# Patient Record
Sex: MALE | Race: WHITE | NOT HISPANIC OR LATINO | Employment: OTHER | URBAN - METROPOLITAN AREA
[De-identification: names, ages, dates, MRNs, and addresses within clinical notes are randomized per-mention and may not be internally consistent; named-entity substitution may affect disease eponyms.]

---

## 2017-11-28 ENCOUNTER — PATIENT MESSAGE (OUTPATIENT)
Dept: SURGERY | Facility: CLINIC | Age: 77
End: 2017-11-28

## 2017-12-12 ENCOUNTER — TELEPHONE (OUTPATIENT)
Dept: SURGERY | Facility: CLINIC | Age: 77
End: 2017-12-12

## 2017-12-12 NOTE — TELEPHONE ENCOUNTER
----- Message from Adria Woodruff MA sent at 12/12/2017 11:11 AM CST -----  Contact: Kim [phone]  Shannon called in regarding Mr Sanchez who needs a f/u appt with Dr Chavez.    Kim can be reached at [phone].    Thanks

## 2017-12-21 ENCOUNTER — OFFICE VISIT (OUTPATIENT)
Dept: SURGERY | Facility: CLINIC | Age: 77
End: 2017-12-21
Payer: MEDICARE

## 2017-12-21 VITALS
TEMPERATURE: 98 F | HEIGHT: 64 IN | DIASTOLIC BLOOD PRESSURE: 86 MMHG | BODY MASS INDEX: 43.1 KG/M2 | HEART RATE: 71 BPM | WEIGHT: 252.44 LBS | SYSTOLIC BLOOD PRESSURE: 177 MMHG

## 2017-12-21 DIAGNOSIS — C22.9 MALIGNANT NEOPLASM OF LIVER, UNSPECIFIED LIVER MALIGNANCY TYPE: Primary | ICD-10-CM

## 2017-12-21 PROCEDURE — 99999 PR PBB SHADOW E&M-EST. PATIENT-LVL III: CPT | Mod: PBBFAC,,, | Performed by: SURGERY

## 2017-12-21 PROCEDURE — 99214 OFFICE O/P EST MOD 30 MIN: CPT | Mod: S$PBB,,, | Performed by: SURGERY

## 2017-12-21 PROCEDURE — 99213 OFFICE O/P EST LOW 20 MIN: CPT | Mod: PBBFAC | Performed by: SURGERY

## 2017-12-21 RX ORDER — MUPIROCIN 20 MG/G
OINTMENT TOPICAL
COMMUNITY
Start: 2017-09-20 | End: 2018-02-09

## 2017-12-21 RX ORDER — CEPHALEXIN 500 MG/1
CAPSULE ORAL
COMMUNITY
Start: 2017-09-18 | End: 2018-02-09

## 2017-12-21 NOTE — PROGRESS NOTES
"History & Physical    SUBJECTIVE:     History of Present Illness:  Patient is a 77 y.o. male known to Dr. Chavez's service s/p a laparoscopic liver biopsy in 05/2016 (results benign) who presents for evaluation of enlargement of the liver mass on CT done at an outside hospital and biopsy which showed "clear cell neoplasm". He also had a right upper lobe pulmonary nodule biopsied at the same time which showed adenocarcinoma of the lung. Patient states that he sees an oncologist for his anemia who ordered the CT and referred him here. He is otherwise in his typical state of health and reports no changes.     Chief Complaint   Patient presents with    Follow-up       Review of patient's allergies indicates:   Allergen Reactions    Altace [ramipril] Anaphylaxis       Current Outpatient Prescriptions   Medication Sig Dispense Refill    apixaban (ELIQUIS) 5 mg Tab Take 5 mg by mouth 2 (two) times daily.      aspirin 81 MG Chew Take 81 mg by mouth once daily.      calcium carbonate (OS-CAMILA) 600 mg (1,500 mg) Tab Take 600 mg by mouth 2 (two) times daily with meals.      cephALEXin (KEFLEX) 500 MG capsule       finasteride (PROSCAR) 5 mg tablet       furosemide (LASIX) 40 MG tablet Take 40 mg by mouth once daily.      iron polysaccharides (NIFEREX) 150 mg iron Cap Take 150 mg by mouth 2 (two) times daily.      metformin (GLUCOPHAGE) 1000 MG tablet Take 1,000 mg by mouth 2 (two) times daily with meals.      MULTIVIT-IRON-MIN-FOLIC ACID 3,500-18-0.4 UNIT-MG-MG ORAL CHEW Take by mouth.      mupirocin (BACTROBAN) 2 % ointment       pantoprazole (PROTONIX) 40 MG tablet Take 40 mg by mouth once daily.      pravastatin (PRAVACHOL) 10 MG tablet Take 10 mg by mouth once daily.      TOPROL XL 25 mg 24 hr tablet       verapamil (VERELAN PM) 300 mg 24 hr capsule       vitamin D 1000 units Tab Take 2,000 Units by mouth once daily.      metformin (GLUCOPHAGE) 500 MG tablet       senna-docusate 8.6-50 mg (PERICOLACE) " "8.6-50 mg per tablet Take 1 tablet by mouth 2 (two) times daily. 30 tablet 0    tamsulosin (FLOMAX) 0.4 mg Cp24        No current facility-administered medications for this visit.        Past Medical History:   Diagnosis Date    Cancer     Diabetes mellitus     Encounter for blood transfusion     GI bleed 8/6/2003    History of knee replacement procedure of right knee 2007    Hypertension     Sleep apnea 12/18/15    Stroke 2014    Stroke 7/16/14    TIA (transient ischemic attack) 2/11/15     Past Surgical History:   Procedure Laterality Date    APPENDECTOMY  teen    BONE MARROW BIOPSY      CATARACT EXTRACTION  12/10/11    COLONOSCOPY  12/21/15    KNEE SURGERY Right replacement     No family history on file.  Social History   Substance Use Topics    Smoking status: Never Smoker    Smokeless tobacco: Not on file    Alcohol use No        Review of Systems:  Review of Systems   Constitutional: Negative for activity change, appetite change, fatigue and fever.   Respiratory: Negative for cough and shortness of breath.    Cardiovascular: Negative for chest pain and palpitations.   Gastrointestinal: Negative for abdominal distention, abdominal pain, constipation, diarrhea, nausea and vomiting.   Endocrine: Negative for cold intolerance and heat intolerance.   Musculoskeletal: Negative for arthralgias and myalgias.   Neurological: Negative for dizziness and light-headedness.       OBJECTIVE:     Vital Signs (Most Recent)  Temp: 98.1 °F (36.7 °C) (12/21/17 1042)  Pulse: 71 (12/21/17 1042)  BP: (!) 177/86 (12/21/17 1042)  5' 4" (1.626 m)  114.5 kg (252 lb 6.8 oz)     Physical Exam:  Physical Exam   Constitutional: He is oriented to person, place, and time. He appears well-developed and well-nourished. No distress.   HENT:   Head: Normocephalic and atraumatic.   Cardiovascular: Normal rate and regular rhythm.    Pulmonary/Chest: Effort normal. No respiratory distress.   Abdominal: Soft. He exhibits no " distension. There is no tenderness. There is no rebound and no guarding.   Neurological: He is alert and oriented to person, place, and time.   Skin: Skin is warm and dry.       Diagnostic Results:  Outside CT results:  There is peripheral hypervascular lesion in the dome of the segment four of the liver measuring approximately 5.7x7.2cm has significant decrease in size comparison with the prior 11/21/2012 without significant changes in hypervascularity. In the segment three of the liver there is an 9mm hypovascular lesion that is unchanged in comparison with the prior CT of the chest. There are numberous hypovascular lesions involving most of the right lobe of the liver measuring approximately 63o79m6.1cm probably consistent with the necrotic mass. 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 My findings are summarized in the resident's note included in the record.  Needs a complete review of path  No surgical intervention would be appropriate given the two liver lesions and lung lesion  Will communicate with med onc in [facility]  Patient would prefer to be seen and followed close to home

## 2017-12-28 ENCOUNTER — PATIENT MESSAGE (OUTPATIENT)
Dept: SURGERY | Facility: CLINIC | Age: 77
End: 2017-12-28

## 2018-01-01 ENCOUNTER — TELEPHONE (OUTPATIENT)
Dept: INTERVENTIONAL RADIOLOGY/VASCULAR | Facility: CLINIC | Age: 78
End: 2018-01-01

## 2018-01-01 ENCOUNTER — OFFICE VISIT (OUTPATIENT)
Dept: INTERVENTIONAL RADIOLOGY/VASCULAR | Facility: CLINIC | Age: 78
End: 2018-01-01
Payer: MEDICARE

## 2018-01-01 ENCOUNTER — TELEPHONE (OUTPATIENT)
Dept: RADIATION ONCOLOGY | Facility: CLINIC | Age: 78
End: 2018-01-01

## 2018-01-01 ENCOUNTER — DOCUMENTATION ONLY (OUTPATIENT)
Dept: RADIATION ONCOLOGY | Facility: CLINIC | Age: 78
End: 2018-01-01

## 2018-01-01 ENCOUNTER — INITIAL CONSULT (OUTPATIENT)
Dept: RADIATION ONCOLOGY | Facility: CLINIC | Age: 78
End: 2018-01-01
Payer: MEDICARE

## 2018-01-01 ENCOUNTER — HOSPITAL ENCOUNTER (OUTPATIENT)
Dept: RADIATION THERAPY | Facility: HOSPITAL | Age: 78
Discharge: HOME | End: 2018-12-03
Attending: RADIOLOGY
Payer: MEDICARE

## 2018-01-01 ENCOUNTER — SURGERY (OUTPATIENT)
Age: 78
End: 2018-01-01

## 2018-01-01 ENCOUNTER — HOSPITAL ENCOUNTER (OUTPATIENT)
Dept: RADIOLOGY | Facility: HOSPITAL | Age: 78
Discharge: HOME | End: 2018-11-06
Attending: INTERNAL MEDICINE
Payer: MEDICARE

## 2018-01-01 ENCOUNTER — PATIENT MESSAGE (OUTPATIENT)
Dept: HEPATOLOGY | Facility: CLINIC | Age: 78
End: 2018-01-01

## 2018-01-01 ENCOUNTER — HOSPITAL ENCOUNTER (OUTPATIENT)
Dept: RADIOLOGY | Facility: HOSPITAL | Age: 78
Discharge: HOME | End: 2018-06-29
Attending: FAMILY MEDICINE
Payer: MEDICARE

## 2018-01-01 ENCOUNTER — LAB VISIT (OUTPATIENT)
Dept: LAB | Facility: HOSPITAL | Age: 78
End: 2018-01-01
Payer: MEDICARE

## 2018-01-01 ENCOUNTER — HOSPITAL ENCOUNTER (OUTPATIENT)
Dept: RADIOLOGY | Facility: HOSPITAL | Age: 78
Discharge: HOME | End: 2018-08-08
Attending: FAMILY MEDICINE | Admitting: RADIOLOGY
Payer: MEDICARE

## 2018-01-01 ENCOUNTER — TELEPHONE (OUTPATIENT)
Dept: HEPATOLOGY | Facility: CLINIC | Age: 78
End: 2018-01-01

## 2018-01-01 ENCOUNTER — TELEPHONE (OUTPATIENT)
Dept: INTERVENTIONAL RADIOLOGY/VASCULAR | Facility: HOSPITAL | Age: 78
End: 2018-01-01

## 2018-01-01 ENCOUNTER — OFFICE VISIT (OUTPATIENT)
Dept: HEMATOLOGY/ONCOLOGY | Facility: CLINIC | Age: 78
End: 2018-01-01
Payer: MEDICARE

## 2018-01-01 ENCOUNTER — PATIENT MESSAGE (OUTPATIENT)
Dept: HEMATOLOGY/ONCOLOGY | Facility: CLINIC | Age: 78
End: 2018-01-01

## 2018-01-01 ENCOUNTER — LAB VISIT (OUTPATIENT)
Dept: LAB | Facility: HOSPITAL | Age: 78
End: 2018-01-01
Attending: INTERNAL MEDICINE
Payer: MEDICARE

## 2018-01-01 ENCOUNTER — DOCUMENTATION ONLY (OUTPATIENT)
Dept: TRANSPLANT | Facility: CLINIC | Age: 78
End: 2018-01-01

## 2018-01-01 ENCOUNTER — HOSPITAL ENCOUNTER (OUTPATIENT)
Dept: RADIATION THERAPY | Facility: HOSPITAL | Age: 78
Discharge: HOME | End: 2018-11-08
Attending: RADIOLOGY
Payer: MEDICARE

## 2018-01-01 ENCOUNTER — HOSPITAL ENCOUNTER (OUTPATIENT)
Facility: HOSPITAL | Age: 78
Discharge: HOME | End: 2018-07-18
Attending: RADIOLOGY | Admitting: RADIOLOGY
Payer: MEDICARE

## 2018-01-01 ENCOUNTER — HOSPITAL ENCOUNTER (OUTPATIENT)
Dept: RADIOLOGY | Facility: HOSPITAL | Age: 78
Discharge: HOME | End: 2018-07-18
Attending: FAMILY MEDICINE | Admitting: RADIOLOGY
Payer: MEDICARE

## 2018-01-01 ENCOUNTER — HOSPITAL ENCOUNTER (OUTPATIENT)
Dept: RADIOLOGY | Facility: HOSPITAL | Age: 78
Discharge: HOME | End: 2018-12-21
Attending: FAMILY MEDICINE
Payer: MEDICARE

## 2018-01-01 ENCOUNTER — HOSPITAL ENCOUNTER (OUTPATIENT)
Facility: HOSPITAL | Age: 78
Discharge: HOME | End: 2018-09-25
Attending: RADIOLOGY | Admitting: RADIOLOGY
Payer: MEDICARE

## 2018-01-01 ENCOUNTER — HOSPITAL ENCOUNTER (OUTPATIENT)
Dept: RADIOLOGY | Facility: HOSPITAL | Age: 78
Discharge: HOME | End: 2018-09-25
Attending: FAMILY MEDICINE | Admitting: RADIOLOGY
Payer: MEDICARE

## 2018-01-01 ENCOUNTER — HOSPITAL ENCOUNTER (OUTPATIENT)
Facility: HOSPITAL | Age: 78
Discharge: HOME | End: 2018-08-08
Attending: RADIOLOGY | Admitting: RADIOLOGY
Payer: MEDICARE

## 2018-01-01 ENCOUNTER — OFFICE VISIT (OUTPATIENT)
Dept: HEPATOLOGY | Facility: CLINIC | Age: 78
End: 2018-01-01
Payer: MEDICARE

## 2018-01-01 VITALS
WEIGHT: 255.19 LBS | HEART RATE: 81 BPM | DIASTOLIC BLOOD PRESSURE: 83 MMHG | HEIGHT: 64 IN | BODY MASS INDEX: 43.57 KG/M2 | SYSTOLIC BLOOD PRESSURE: 155 MMHG

## 2018-01-01 VITALS
HEIGHT: 64 IN | DIASTOLIC BLOOD PRESSURE: 88 MMHG | WEIGHT: 246.88 LBS | BODY MASS INDEX: 42.15 KG/M2 | SYSTOLIC BLOOD PRESSURE: 149 MMHG | HEART RATE: 92 BPM

## 2018-01-01 VITALS
WEIGHT: 249.31 LBS | BODY MASS INDEX: 42.56 KG/M2 | SYSTOLIC BLOOD PRESSURE: 156 MMHG | HEART RATE: 80 BPM | HEIGHT: 64 IN | DIASTOLIC BLOOD PRESSURE: 87 MMHG

## 2018-01-01 VITALS
OXYGEN SATURATION: 100 % | TEMPERATURE: 98 F | SYSTOLIC BLOOD PRESSURE: 161 MMHG | DIASTOLIC BLOOD PRESSURE: 88 MMHG | HEART RATE: 80 BPM | WEIGHT: 247 LBS | RESPIRATION RATE: 16 BRPM | BODY MASS INDEX: 42.17 KG/M2 | HEIGHT: 64 IN

## 2018-01-01 VITALS
HEIGHT: 64 IN | RESPIRATION RATE: 18 BRPM | WEIGHT: 255 LBS | OXYGEN SATURATION: 98 % | BODY MASS INDEX: 43.54 KG/M2 | DIASTOLIC BLOOD PRESSURE: 71 MMHG | HEART RATE: 87 BPM | TEMPERATURE: 97 F | SYSTOLIC BLOOD PRESSURE: 161 MMHG

## 2018-01-01 VITALS
BODY MASS INDEX: 42.83 KG/M2 | WEIGHT: 250.88 LBS | SYSTOLIC BLOOD PRESSURE: 150 MMHG | HEIGHT: 64 IN | HEART RATE: 88 BPM | DIASTOLIC BLOOD PRESSURE: 87 MMHG

## 2018-01-01 VITALS
DIASTOLIC BLOOD PRESSURE: 68 MMHG | RESPIRATION RATE: 18 BRPM | TEMPERATURE: 98 F | WEIGHT: 261.69 LBS | SYSTOLIC BLOOD PRESSURE: 142 MMHG | BODY MASS INDEX: 44.68 KG/M2 | HEART RATE: 85 BPM | OXYGEN SATURATION: 97 % | HEIGHT: 64 IN

## 2018-01-01 VITALS
BODY MASS INDEX: 42.85 KG/M2 | TEMPERATURE: 98 F | HEART RATE: 70 BPM | SYSTOLIC BLOOD PRESSURE: 128 MMHG | WEIGHT: 251 LBS | OXYGEN SATURATION: 99 % | DIASTOLIC BLOOD PRESSURE: 90 MMHG | RESPIRATION RATE: 16 BRPM | HEIGHT: 64 IN

## 2018-01-01 VITALS
SYSTOLIC BLOOD PRESSURE: 149 MMHG | OXYGEN SATURATION: 98 % | RESPIRATION RATE: 18 BRPM | BODY MASS INDEX: 42.16 KG/M2 | WEIGHT: 246.94 LBS | TEMPERATURE: 97 F | HEIGHT: 64 IN | HEART RATE: 92 BPM | DIASTOLIC BLOOD PRESSURE: 88 MMHG

## 2018-01-01 VITALS
HEIGHT: 64 IN | WEIGHT: 248.25 LBS | OXYGEN SATURATION: 97 % | TEMPERATURE: 96 F | RESPIRATION RATE: 18 BRPM | HEART RATE: 67 BPM | SYSTOLIC BLOOD PRESSURE: 130 MMHG | BODY MASS INDEX: 42.38 KG/M2 | DIASTOLIC BLOOD PRESSURE: 63 MMHG

## 2018-01-01 VITALS
OXYGEN SATURATION: 99 % | SYSTOLIC BLOOD PRESSURE: 134 MMHG | HEART RATE: 110 BPM | HEIGHT: 64 IN | TEMPERATURE: 98 F | BODY MASS INDEX: 42.66 KG/M2 | WEIGHT: 249.88 LBS | RESPIRATION RATE: 20 BRPM | DIASTOLIC BLOOD PRESSURE: 86 MMHG

## 2018-01-01 VITALS
SYSTOLIC BLOOD PRESSURE: 189 MMHG | BODY MASS INDEX: 43.25 KG/M2 | RESPIRATION RATE: 18 BRPM | HEART RATE: 99 BPM | WEIGHT: 253.31 LBS | DIASTOLIC BLOOD PRESSURE: 85 MMHG | HEIGHT: 64 IN | TEMPERATURE: 97 F | OXYGEN SATURATION: 98 %

## 2018-01-01 DIAGNOSIS — C22.0 HEPATOCELLULAR CARCINOMA: Primary | ICD-10-CM

## 2018-01-01 DIAGNOSIS — R91.8 MASS OF UPPER LOBE OF RIGHT LUNG: ICD-10-CM

## 2018-01-01 DIAGNOSIS — E11.9 TYPE 2 DIABETES MELLITUS WITHOUT COMPLICATION, WITHOUT LONG-TERM CURRENT USE OF INSULIN: ICD-10-CM

## 2018-01-01 DIAGNOSIS — C22.0 HEPATOCELLULAR CARCINOMA: ICD-10-CM

## 2018-01-01 DIAGNOSIS — C22.0 HCC (HEPATOCELLULAR CARCINOMA): ICD-10-CM

## 2018-01-01 DIAGNOSIS — C34.11 PRIMARY ADENOCARCINOMA OF UPPER LOBE OF RIGHT LUNG: ICD-10-CM

## 2018-01-01 DIAGNOSIS — K74.60 HEPATIC CIRRHOSIS, UNSPECIFIED HEPATIC CIRRHOSIS TYPE, UNSPECIFIED WHETHER ASCITES PRESENT: ICD-10-CM

## 2018-01-01 DIAGNOSIS — K76.9 LIVER LESION: ICD-10-CM

## 2018-01-01 DIAGNOSIS — I48.20 CHRONIC A-FIB: ICD-10-CM

## 2018-01-01 DIAGNOSIS — C22.0 HCC (HEPATOCELLULAR CARCINOMA): Primary | ICD-10-CM

## 2018-01-01 DIAGNOSIS — K11.9 LESION OF PAROTID GLAND: ICD-10-CM

## 2018-01-01 DIAGNOSIS — K74.60 HEPATIC CIRRHOSIS, UNSPECIFIED HEPATIC CIRRHOSIS TYPE, UNSPECIFIED WHETHER ASCITES PRESENT: Primary | ICD-10-CM

## 2018-01-01 DIAGNOSIS — C34.11 PRIMARY ADENOCARCINOMA OF UPPER LOBE OF RIGHT LUNG: Primary | ICD-10-CM

## 2018-01-01 LAB
AFP SERPL-MCNC: 11 NG/ML
AFP SERPL-MCNC: 16 NG/ML
AFP SERPL-MCNC: 7.3 NG/ML
AFP SERPL-MCNC: 8.5 NG/ML
ALBUMIN SERPL BCP-MCNC: 3 G/DL
ALBUMIN SERPL BCP-MCNC: 3.2 G/DL
ALBUMIN SERPL BCP-MCNC: 3.3 G/DL
ALBUMIN SERPL BCP-MCNC: 3.4 G/DL
ALP SERPL-CCNC: 112 U/L
ALP SERPL-CCNC: 112 U/L
ALP SERPL-CCNC: 139 U/L
ALP SERPL-CCNC: 172 U/L
ALT SERPL W/O P-5'-P-CCNC: 10 U/L
ALT SERPL W/O P-5'-P-CCNC: 12 U/L
ALT SERPL W/O P-5'-P-CCNC: 16 U/L
ALT SERPL W/O P-5'-P-CCNC: 17 U/L
ANION GAP SERPL CALC-SCNC: 10 MMOL/L
ANION GAP SERPL CALC-SCNC: 11 MMOL/L
ANION GAP SERPL CALC-SCNC: 13 MMOL/L
ANION GAP SERPL CALC-SCNC: 14 MMOL/L
AST SERPL-CCNC: 17 U/L
AST SERPL-CCNC: 20 U/L
AST SERPL-CCNC: 22 U/L
AST SERPL-CCNC: 29 U/L
BILIRUB SERPL-MCNC: 0.9 MG/DL
BILIRUB SERPL-MCNC: 0.9 MG/DL
BILIRUB SERPL-MCNC: 1 MG/DL
BILIRUB SERPL-MCNC: 1.2 MG/DL
BUN SERPL-MCNC: 12 MG/DL
BUN SERPL-MCNC: 13 MG/DL
BUN SERPL-MCNC: 15 MG/DL
BUN SERPL-MCNC: 21 MG/DL
CALCIUM SERPL-MCNC: 9.2 MG/DL
CALCIUM SERPL-MCNC: 9.3 MG/DL
CALCIUM SERPL-MCNC: 9.6 MG/DL
CALCIUM SERPL-MCNC: 9.9 MG/DL
CHLORIDE SERPL-SCNC: 104 MMOL/L
CHLORIDE SERPL-SCNC: 105 MMOL/L
CO2 SERPL-SCNC: 21 MMOL/L
CO2 SERPL-SCNC: 24 MMOL/L
CO2 SERPL-SCNC: 24 MMOL/L
CO2 SERPL-SCNC: 26 MMOL/L
CREAT SERPL-MCNC: 0.9 MG/DL
CREAT SERPL-MCNC: 0.9 MG/DL
CREAT SERPL-MCNC: 0.9 MG/DL (ref 0.5–1.4)
CREAT SERPL-MCNC: 1 MG/DL
CREAT SERPL-MCNC: 1.3 MG/DL
CREAT SERPL-MCNC: 1.5 MG/DL (ref 0.5–1.4)
EST. GFR  (AFRICAN AMERICAN): >60 ML/MIN/1.73 M^2
EST. GFR  (NON AFRICAN AMERICAN): 53 ML/MIN/1.73 M^2
EST. GFR  (NON AFRICAN AMERICAN): >60 ML/MIN/1.73 M^2
GLUCOSE SERPL-MCNC: 105 MG/DL
GLUCOSE SERPL-MCNC: 115 MG/DL
GLUCOSE SERPL-MCNC: 116 MG/DL
GLUCOSE SERPL-MCNC: 121 MG/DL
HBV SURFACE AB SER-ACNC: NEGATIVE M[IU]/ML
HBV SURFACE AG SERPL QL IA: NEGATIVE
HCV AB SERPL QL IA: NEGATIVE
POCT GLUCOSE: 119 MG/DL (ref 70–110)
POCT GLUCOSE: 137 MG/DL (ref 70–110)
POCT GLUCOSE: 150 MG/DL (ref 70–110)
POCT GLUCOSE: 94 MG/DL (ref 70–110)
POTASSIUM SERPL-SCNC: 4.2 MMOL/L
POTASSIUM SERPL-SCNC: 4.4 MMOL/L
POTASSIUM SERPL-SCNC: 4.9 MMOL/L
POTASSIUM SERPL-SCNC: 5.1 MMOL/L
PROT SERPL-MCNC: 6.3 G/DL
PROT SERPL-MCNC: 6.4 G/DL
PROT SERPL-MCNC: 6.7 G/DL
PROT SERPL-MCNC: 6.8 G/DL
SAMPLE: ABNORMAL
SAMPLE: NORMAL
SODIUM SERPL-SCNC: 138 MMOL/L
SODIUM SERPL-SCNC: 139 MMOL/L
SODIUM SERPL-SCNC: 141 MMOL/L
SODIUM SERPL-SCNC: 142 MMOL/L

## 2018-01-01 PROCEDURE — 63600175 PHARM REV CODE 636 W HCPCS: Performed by: RADIOLOGY

## 2018-01-01 PROCEDURE — 77290 THER RAD SIMULAJ FIELD CPLX: CPT | Mod: 26,,, | Performed by: RADIOLOGY

## 2018-01-01 PROCEDURE — 25000003 PHARM REV CODE 250: Performed by: RADIOLOGY

## 2018-01-01 PROCEDURE — 77338 DESIGN MLC DEVICE FOR IMRT: CPT | Mod: 26,,, | Performed by: RADIOLOGY

## 2018-01-01 PROCEDURE — 80053 COMPREHEN METABOLIC PANEL: CPT

## 2018-01-01 PROCEDURE — 99213 OFFICE O/P EST LOW 20 MIN: CPT | Mod: PBBFAC,25

## 2018-01-01 PROCEDURE — 36415 COLL VENOUS BLD VENIPUNCTURE: CPT

## 2018-01-01 PROCEDURE — 77014 HC CT GUIDANCE RADIATION THERAPY FLDS PLACEMENT: CPT | Mod: TC | Performed by: RADIOLOGY

## 2018-01-01 PROCEDURE — 99213 OFFICE O/P EST LOW 20 MIN: CPT | Mod: S$PBB,,, | Performed by: FAMILY MEDICINE

## 2018-01-01 PROCEDURE — 99212 OFFICE O/P EST SF 10 MIN: CPT | Mod: S$PBB,,, | Performed by: FAMILY MEDICINE

## 2018-01-01 PROCEDURE — 99999 PR PBB SHADOW E&M-EST. PATIENT-LVL III: CPT | Mod: PBBFAC,,,

## 2018-01-01 PROCEDURE — 77373 STRTCTC BDY RAD THER TX DLVR: CPT | Performed by: RADIOLOGY

## 2018-01-01 PROCEDURE — 25000003 PHARM REV CODE 250: Performed by: FAMILY MEDICINE

## 2018-01-01 PROCEDURE — 77301 RADIOTHERAPY DOSE PLAN IMRT: CPT | Mod: TC | Performed by: RADIOLOGY

## 2018-01-01 PROCEDURE — 74183 MRI ABD W/O CNTR FLWD CNTR: CPT | Mod: 26,,, | Performed by: RADIOLOGY

## 2018-01-01 PROCEDURE — 82962 GLUCOSE BLOOD TEST: CPT

## 2018-01-01 PROCEDURE — 78815 PET IMAGE W/CT SKULL-THIGH: CPT | Mod: 26,PI,, | Performed by: RADIOLOGY

## 2018-01-01 PROCEDURE — 25500020 PHARM REV CODE 255: Performed by: RADIOLOGY

## 2018-01-01 PROCEDURE — 99213 OFFICE O/P EST LOW 20 MIN: CPT | Mod: PBBFAC

## 2018-01-01 PROCEDURE — 99215 OFFICE O/P EST HI 40 MIN: CPT | Mod: S$PBB,,, | Performed by: INTERNAL MEDICINE

## 2018-01-01 PROCEDURE — 99212 OFFICE O/P EST SF 10 MIN: CPT | Mod: S$PBB,,, | Performed by: RADIOLOGY

## 2018-01-01 PROCEDURE — 77470 SPECIAL RADIATION TREATMENT: CPT | Mod: 26,59,, | Performed by: RADIOLOGY

## 2018-01-01 PROCEDURE — 99215 OFFICE O/P EST HI 40 MIN: CPT | Mod: PBBFAC | Performed by: INTERNAL MEDICINE

## 2018-01-01 PROCEDURE — 70553 MRI BRAIN STEM W/O & W/DYE: CPT | Mod: TC

## 2018-01-01 PROCEDURE — A9540 TC99M MAA: HCPCS

## 2018-01-01 PROCEDURE — 82105 ALPHA-FETOPROTEIN SERUM: CPT

## 2018-01-01 PROCEDURE — 78201 LIVER IMAGING STATIC ONLY: CPT | Mod: TC

## 2018-01-01 PROCEDURE — A9585 GADOBUTROL INJECTION: HCPCS | Performed by: FAMILY MEDICINE

## 2018-01-01 PROCEDURE — 25500020 PHARM REV CODE 255: Performed by: FAMILY MEDICINE

## 2018-01-01 PROCEDURE — 86803 HEPATITIS C AB TEST: CPT

## 2018-01-01 PROCEDURE — 99999 PR PBB SHADOW E&M-EST. PATIENT-LVL II: CPT | Mod: PBBFAC,,,

## 2018-01-01 PROCEDURE — 77293 RESPIRATOR MOTION MGMT SIMUL: CPT | Mod: TC | Performed by: RADIOLOGY

## 2018-01-01 PROCEDURE — 86706 HEP B SURFACE ANTIBODY: CPT

## 2018-01-01 PROCEDURE — 77338 DESIGN MLC DEVICE FOR IMRT: CPT | Mod: TC | Performed by: RADIOLOGY

## 2018-01-01 PROCEDURE — 78201 LIVER IMAGING STATIC ONLY: CPT | Mod: 26,,, | Performed by: RADIOLOGY

## 2018-01-01 PROCEDURE — 70553 MRI BRAIN STEM W/O & W/DYE: CPT | Mod: 26,,, | Performed by: RADIOLOGY

## 2018-01-01 PROCEDURE — 99204 OFFICE O/P NEW MOD 45 MIN: CPT | Mod: S$PBB,,, | Performed by: INTERNAL MEDICINE

## 2018-01-01 PROCEDURE — 63600175 PHARM REV CODE 636 W HCPCS: Performed by: FAMILY MEDICINE

## 2018-01-01 PROCEDURE — 77334 RADIATION TREATMENT AID(S): CPT | Mod: TC | Performed by: RADIOLOGY

## 2018-01-01 PROCEDURE — 77263 THER RADIOLOGY TX PLNG CPLX: CPT | Mod: ,,, | Performed by: RADIOLOGY

## 2018-01-01 PROCEDURE — 77370 RADIATION PHYSICS CONSULT: CPT | Performed by: RADIOLOGY

## 2018-01-01 PROCEDURE — 99204 OFFICE O/P NEW MOD 45 MIN: CPT | Mod: S$PBB,,, | Performed by: RADIOLOGY

## 2018-01-01 PROCEDURE — 99999 PR PBB SHADOW E&M-EST. PATIENT-LVL III: CPT | Mod: PBBFAC,,, | Performed by: INTERNAL MEDICINE

## 2018-01-01 PROCEDURE — 77014 HC CT GUIDANCE RADIATION THERAPY FLDS PLACEMENT: CPT | Mod: TC,59 | Performed by: RADIOLOGY

## 2018-01-01 PROCEDURE — 99213 OFFICE O/P EST LOW 20 MIN: CPT | Mod: PBBFAC | Performed by: INTERNAL MEDICINE

## 2018-01-01 PROCEDURE — 77334 RADIATION TREATMENT AID(S): CPT | Mod: 26,,, | Performed by: RADIOLOGY

## 2018-01-01 PROCEDURE — A9552 F18 FDG: HCPCS

## 2018-01-01 PROCEDURE — 78815 PET IMAGE W/CT SKULL-THIGH: CPT | Mod: TC,PI

## 2018-01-01 PROCEDURE — 99215 OFFICE O/P EST HI 40 MIN: CPT | Mod: PBBFAC,27 | Performed by: INTERNAL MEDICINE

## 2018-01-01 PROCEDURE — 99214 OFFICE O/P EST MOD 30 MIN: CPT | Mod: S$PBB,,, | Performed by: INTERNAL MEDICINE

## 2018-01-01 PROCEDURE — 77386 HC IMRT, COMPLEX: CPT | Performed by: RADIOLOGY

## 2018-01-01 PROCEDURE — 99212 OFFICE O/P EST SF 10 MIN: CPT | Mod: PBBFAC

## 2018-01-01 PROCEDURE — 99999 PR PBB SHADOW E&M-EST. PATIENT-LVL V: CPT | Mod: PBBFAC,,, | Performed by: INTERNAL MEDICINE

## 2018-01-01 PROCEDURE — 25500020 PHARM REV CODE 255: Performed by: INTERNAL MEDICINE

## 2018-01-01 PROCEDURE — 77301 RADIOTHERAPY DOSE PLAN IMRT: CPT | Mod: 26,,, | Performed by: RADIOLOGY

## 2018-01-01 PROCEDURE — 74183 MRI ABD W/O CNTR FLWD CNTR: CPT | Mod: TC

## 2018-01-01 PROCEDURE — A9585 GADOBUTROL INJECTION: HCPCS | Performed by: INTERNAL MEDICINE

## 2018-01-01 PROCEDURE — 77293 RESPIRATOR MOTION MGMT SIMUL: CPT | Mod: 26,,, | Performed by: RADIOLOGY

## 2018-01-01 PROCEDURE — 87340 HEPATITIS B SURFACE AG IA: CPT

## 2018-01-01 PROCEDURE — 77300 RADIATION THERAPY DOSE PLAN: CPT | Mod: TC | Performed by: RADIOLOGY

## 2018-01-01 PROCEDURE — 99999 PR PBB SHADOW E&M-EST. PATIENT-LVL IV: CPT | Mod: PBBFAC,,, | Performed by: RADIOLOGY

## 2018-01-01 PROCEDURE — 77336 RADIATION PHYSICS CONSULT: CPT | Performed by: RADIOLOGY

## 2018-01-01 PROCEDURE — 77470 SPECIAL RADIATION TREATMENT: CPT | Mod: 59,TC | Performed by: RADIOLOGY

## 2018-01-01 PROCEDURE — 99214 OFFICE O/P EST MOD 30 MIN: CPT | Mod: PBBFAC,25 | Performed by: RADIOLOGY

## 2018-01-01 PROCEDURE — 63600175 PHARM REV CODE 636 W HCPCS: Performed by: STUDENT IN AN ORGANIZED HEALTH CARE EDUCATION/TRAINING PROGRAM

## 2018-01-01 PROCEDURE — 77300 RADIATION THERAPY DOSE PLAN: CPT | Mod: 26,,, | Performed by: RADIOLOGY

## 2018-01-01 PROCEDURE — 77290 THER RAD SIMULAJ FIELD CPLX: CPT | Mod: TC | Performed by: RADIOLOGY

## 2018-01-01 RX ORDER — DEXAMETHASONE SODIUM PHOSPHATE 100 MG/10ML
INJECTION INTRAMUSCULAR; INTRAVENOUS CODE/TRAUMA/SEDATION MEDICATION
Status: COMPLETED | OUTPATIENT
Start: 2018-01-01 | End: 2018-01-01

## 2018-01-01 RX ORDER — MIDAZOLAM HYDROCHLORIDE 1 MG/ML
INJECTION INTRAMUSCULAR; INTRAVENOUS CODE/TRAUMA/SEDATION MEDICATION
Status: COMPLETED | OUTPATIENT
Start: 2018-01-01 | End: 2018-01-01

## 2018-01-01 RX ORDER — HEPARIN SODIUM 200 [USP'U]/100ML
500 INJECTION, SOLUTION INTRAVENOUS CONTINUOUS
Status: DISCONTINUED | OUTPATIENT
Start: 2018-01-01 | End: 2018-01-01 | Stop reason: HOSPADM

## 2018-01-01 RX ORDER — GADOBUTROL 604.72 MG/ML
10 INJECTION INTRAVENOUS
Status: COMPLETED | OUTPATIENT
Start: 2018-01-01 | End: 2018-01-01

## 2018-01-01 RX ORDER — FUROSEMIDE 40 MG/1
40 TABLET ORAL DAILY
COMMUNITY
Start: 2018-01-01

## 2018-01-01 RX ORDER — MIDAZOLAM HYDROCHLORIDE 1 MG/ML
1 INJECTION INTRAMUSCULAR; INTRAVENOUS
Status: DISCONTINUED | OUTPATIENT
Start: 2018-01-01 | End: 2018-01-01 | Stop reason: HOSPADM

## 2018-01-01 RX ORDER — FENTANYL CITRATE 50 UG/ML
50 INJECTION, SOLUTION INTRAMUSCULAR; INTRAVENOUS
Status: DISCONTINUED | OUTPATIENT
Start: 2018-01-01 | End: 2018-01-01 | Stop reason: HOSPADM

## 2018-01-01 RX ORDER — LIDOCAINE HYDROCHLORIDE 10 MG/ML
1 INJECTION, SOLUTION EPIDURAL; INFILTRATION; INTRACAUDAL; PERINEURAL ONCE AS NEEDED
Status: COMPLETED | OUTPATIENT
Start: 2018-01-01 | End: 2018-01-01

## 2018-01-01 RX ORDER — METOPROLOL SUCCINATE 25 MG/1
25 TABLET, EXTENDED RELEASE ORAL 2 TIMES DAILY
Status: ON HOLD | COMMUNITY
End: 2019-01-01 | Stop reason: SDUPTHER

## 2018-01-01 RX ORDER — METHYLPREDNISOLONE 4 MG/1
TABLET ORAL
Qty: 21 TABLET | Refills: 0 | Status: SHIPPED | OUTPATIENT
Start: 2018-01-01 | End: 2018-01-01

## 2018-01-01 RX ORDER — SODIUM CHLORIDE 9 MG/ML
INJECTION, SOLUTION INTRAVENOUS CONTINUOUS
Status: DISCONTINUED | OUTPATIENT
Start: 2018-01-01 | End: 2018-01-01 | Stop reason: HOSPADM

## 2018-01-01 RX ORDER — LABETALOL HYDROCHLORIDE 5 MG/ML
INJECTION, SOLUTION INTRAVENOUS CODE/TRAUMA/SEDATION MEDICATION
Status: COMPLETED | OUTPATIENT
Start: 2018-01-01 | End: 2018-01-01

## 2018-01-01 RX ORDER — FENTANYL CITRATE 50 UG/ML
INJECTION, SOLUTION INTRAMUSCULAR; INTRAVENOUS CODE/TRAUMA/SEDATION MEDICATION
Status: COMPLETED | OUTPATIENT
Start: 2018-01-01 | End: 2018-01-01

## 2018-01-01 RX ORDER — AMOXICILLIN AND CLAVULANATE POTASSIUM 500; 125 MG/1; MG/1
1 TABLET, FILM COATED ORAL 2 TIMES DAILY
Qty: 14 TABLET | Refills: 0 | Status: SHIPPED | OUTPATIENT
Start: 2018-01-01 | End: 2018-01-01

## 2018-01-01 RX ORDER — ESOMEPRAZOLE MAGNESIUM 10 MG/1
10 GRANULE, FOR SUSPENSION, EXTENDED RELEASE ORAL
Qty: 300 MG | Refills: 5 | Status: SHIPPED | OUTPATIENT
Start: 2018-01-01 | End: 2018-01-01

## 2018-01-01 RX ORDER — LIDOCAINE HYDROCHLORIDE 10 MG/ML
1 INJECTION, SOLUTION EPIDURAL; INFILTRATION; INTRACAUDAL; PERINEURAL ONCE AS NEEDED
Status: DISCONTINUED | OUTPATIENT
Start: 2018-01-01 | End: 2018-01-01 | Stop reason: HOSPADM

## 2018-01-01 RX ORDER — OXYCODONE HYDROCHLORIDE 5 MG/1
5 TABLET ORAL EVERY 6 HOURS PRN
Qty: 10 TABLET | Refills: 0 | Status: SHIPPED | OUTPATIENT
Start: 2018-01-01 | End: 2018-01-01

## 2018-01-01 RX ADMIN — MIDAZOLAM HYDROCHLORIDE 1 MG: 1 INJECTION, SOLUTION INTRAMUSCULAR; INTRAVENOUS at 11:08

## 2018-01-01 RX ADMIN — FENTANYL CITRATE 50 MCG: 50 INJECTION, SOLUTION INTRAMUSCULAR; INTRAVENOUS at 10:08

## 2018-01-01 RX ADMIN — IOHEXOL 80 ML: 300 INJECTION, SOLUTION INTRAVENOUS at 12:09

## 2018-01-01 RX ADMIN — FENTANYL CITRATE 50 MCG: 50 INJECTION, SOLUTION INTRAMUSCULAR; INTRAVENOUS at 11:07

## 2018-01-01 RX ADMIN — MIDAZOLAM HYDROCHLORIDE 0.5 MG: 1 INJECTION, SOLUTION INTRAMUSCULAR; INTRAVENOUS at 11:09

## 2018-01-01 RX ADMIN — FENTANYL CITRATE 50 MCG: 50 INJECTION, SOLUTION INTRAMUSCULAR; INTRAVENOUS at 09:08

## 2018-01-01 RX ADMIN — GADOBUTROL 10 ML: 604.72 INJECTION INTRAVENOUS at 08:11

## 2018-01-01 RX ADMIN — SODIUM CHLORIDE 3 G: 9 INJECTION, SOLUTION INTRAVENOUS at 10:07

## 2018-01-01 RX ADMIN — DEXAMETHASONE SODIUM PHOSPHATE 20 MG: 10 INJECTION INTRAMUSCULAR; INTRAVENOUS at 11:09

## 2018-01-01 RX ADMIN — SODIUM CHLORIDE 3 G: 9 INJECTION, SOLUTION INTRAVENOUS at 10:09

## 2018-01-01 RX ADMIN — SODIUM CHLORIDE: 0.9 INJECTION, SOLUTION INTRAVENOUS at 09:08

## 2018-01-01 RX ADMIN — MIDAZOLAM HYDROCHLORIDE 1 MG: 1 INJECTION, SOLUTION INTRAMUSCULAR; INTRAVENOUS at 10:08

## 2018-01-01 RX ADMIN — IOHEXOL 100 ML: 300 INJECTION, SOLUTION INTRAVENOUS at 11:07

## 2018-01-01 RX ADMIN — MIDAZOLAM HYDROCHLORIDE 1 MG: 1 INJECTION, SOLUTION INTRAMUSCULAR; INTRAVENOUS at 11:07

## 2018-01-01 RX ADMIN — FENTANYL CITRATE 25 MCG: 50 INJECTION, SOLUTION INTRAMUSCULAR; INTRAVENOUS at 11:09

## 2018-01-01 RX ADMIN — LABETALOL HYDROCHLORIDE 10 MG: 5 INJECTION, SOLUTION INTRAVENOUS at 10:08

## 2018-01-01 RX ADMIN — FENTANYL CITRATE 50 MCG: 50 INJECTION, SOLUTION INTRAMUSCULAR; INTRAVENOUS at 11:09

## 2018-01-01 RX ADMIN — GADOBUTROL 10 ML: 604.72 INJECTION INTRAVENOUS at 08:06

## 2018-01-01 RX ADMIN — FENTANYL CITRATE 25 MCG: 50 INJECTION, SOLUTION INTRAMUSCULAR; INTRAVENOUS at 12:09

## 2018-01-01 RX ADMIN — SODIUM CHLORIDE 3 G: 9 INJECTION, SOLUTION INTRAVENOUS at 09:08

## 2018-01-01 RX ADMIN — LIDOCAINE HYDROCHLORIDE 1 MG: 10 INJECTION, SOLUTION EPIDURAL; INFILTRATION; INTRACAUDAL; PERINEURAL at 10:07

## 2018-01-01 RX ADMIN — FENTANYL CITRATE 50 MCG: 50 INJECTION, SOLUTION INTRAMUSCULAR; INTRAVENOUS at 11:08

## 2018-01-01 RX ADMIN — MIDAZOLAM HYDROCHLORIDE 1 MG: 1 INJECTION, SOLUTION INTRAMUSCULAR; INTRAVENOUS at 09:08

## 2018-01-01 RX ADMIN — SODIUM CHLORIDE: 0.9 INJECTION, SOLUTION INTRAVENOUS at 09:09

## 2018-01-01 RX ADMIN — SODIUM CHLORIDE: 0.9 INJECTION, SOLUTION INTRAVENOUS at 10:07

## 2018-01-01 RX ADMIN — LIDOCAINE HYDROCHLORIDE 0.2 MG: 10 INJECTION, SOLUTION EPIDURAL; INFILTRATION; INTRACAUDAL; PERINEURAL at 09:09

## 2018-01-01 RX ADMIN — GADOBUTROL 10 ML: 604.72 INJECTION INTRAVENOUS at 09:12

## 2018-01-01 RX ADMIN — MIDAZOLAM HYDROCHLORIDE 1 MG: 1 INJECTION, SOLUTION INTRAMUSCULAR; INTRAVENOUS at 11:09

## 2018-02-05 ENCOUNTER — TELEPHONE (OUTPATIENT)
Dept: HEMATOLOGY/ONCOLOGY | Facility: CLINIC | Age: 78
End: 2018-02-05

## 2018-02-09 ENCOUNTER — INITIAL CONSULT (OUTPATIENT)
Dept: HEMATOLOGY/ONCOLOGY | Facility: CLINIC | Age: 78
End: 2018-02-09
Payer: MEDICARE

## 2018-02-09 ENCOUNTER — PATIENT MESSAGE (OUTPATIENT)
Dept: HEMATOLOGY/ONCOLOGY | Facility: CLINIC | Age: 78
End: 2018-02-09

## 2018-02-09 ENCOUNTER — TELEPHONE (OUTPATIENT)
Dept: HEMATOLOGY/ONCOLOGY | Facility: CLINIC | Age: 78
End: 2018-02-09

## 2018-02-09 ENCOUNTER — LAB VISIT (OUTPATIENT)
Dept: LAB | Facility: HOSPITAL | Age: 78
End: 2018-02-09
Attending: INTERNAL MEDICINE
Payer: MEDICARE

## 2018-02-09 VITALS
TEMPERATURE: 98 F | BODY MASS INDEX: 42.56 KG/M2 | RESPIRATION RATE: 20 BRPM | OXYGEN SATURATION: 95 % | HEIGHT: 64 IN | DIASTOLIC BLOOD PRESSURE: 74 MMHG | HEART RATE: 72 BPM | SYSTOLIC BLOOD PRESSURE: 165 MMHG | WEIGHT: 249.31 LBS

## 2018-02-09 DIAGNOSIS — C34.90 ADENOCARCINOMA OF LUNG, UNSPECIFIED LATERALITY: ICD-10-CM

## 2018-02-09 DIAGNOSIS — C22.0 HCC (HEPATOCELLULAR CARCINOMA): ICD-10-CM

## 2018-02-09 DIAGNOSIS — D49.0 NEOPLASM OF LIVER: ICD-10-CM

## 2018-02-09 DIAGNOSIS — C22.0 HCC (HEPATOCELLULAR CARCINOMA): Primary | ICD-10-CM

## 2018-02-09 LAB — AFP SERPL-MCNC: 2.1 NG/ML

## 2018-02-09 PROCEDURE — 82105 ALPHA-FETOPROTEIN SERUM: CPT

## 2018-02-09 PROCEDURE — 99999 PR PBB SHADOW E&M-EST. PATIENT-LVL V: CPT | Mod: PBBFAC,GC,, | Performed by: STUDENT IN AN ORGANIZED HEALTH CARE EDUCATION/TRAINING PROGRAM

## 2018-02-09 PROCEDURE — 36415 COLL VENOUS BLD VENIPUNCTURE: CPT

## 2018-02-09 PROCEDURE — 1159F MED LIST DOCD IN RCRD: CPT | Mod: GC,,, | Performed by: STUDENT IN AN ORGANIZED HEALTH CARE EDUCATION/TRAINING PROGRAM

## 2018-02-09 PROCEDURE — 99205 OFFICE O/P NEW HI 60 MIN: CPT | Mod: S$PBB,GC,, | Performed by: STUDENT IN AN ORGANIZED HEALTH CARE EDUCATION/TRAINING PROGRAM

## 2018-02-09 PROCEDURE — 99215 OFFICE O/P EST HI 40 MIN: CPT | Mod: PBBFAC | Performed by: STUDENT IN AN ORGANIZED HEALTH CARE EDUCATION/TRAINING PROGRAM

## 2018-02-09 PROCEDURE — 1126F AMNT PAIN NOTED NONE PRSNT: CPT | Mod: GC,,, | Performed by: STUDENT IN AN ORGANIZED HEALTH CARE EDUCATION/TRAINING PROGRAM

## 2018-02-09 NOTE — TELEPHONE ENCOUNTER
Called patient and spoke with patients wife and assisted with scheduling her husbands MRI for Monday

## 2018-02-09 NOTE — TELEPHONE ENCOUNTER
----- Message from Mt Gomez MD sent at 2/9/2018  1:11 PM CST -----  Please call and schedule patient for MRI liver. Thanks.

## 2018-02-09 NOTE — PROGRESS NOTES
Subjective:       Patient ID: Hossein Sanchez Sr. is a 77 y.o. male.    Chief Complaint: Consult    Patient is a 78yo WM with PMHx of HTN, HLD, Afib (on eliquis), CVA, and newly diagnosed lung and liver cancer. Patient reports first noticing abdominal pain in 2016 led to CT imaging and laparoscopic liver biopsy on 2016 with benign pathology. Surveillance CT scan by pulmonology for lung nodule noted increase in size of lung lesion (3.0x2.8cm) and also noted cirrhotic liver with a right liver lobe lesion (94e49y8.1cm). 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 Patient evaluated and felt not to be a surgical candidate for resection of lung or liver lesion. He has established with radiation oncology with plans to undergo SBRT of his lung lesion. [place] [facility]. Today he reports feeling well. He denies any pain. He has chronic LE edema, but no changes in abdominal girth/distention. He endorses low grade temperatures of  over the past 6mo. Denies night sweats, appetite change or weight loss. He denies nausea/vomiting or changes in bowel habit. Patient notes ARTEAGA, but he denies chest pain, palpitations, cough. No other complaints.     ECO    Oncologic History:  Abdominal pain in 2016 led to CT imaging and laparoscopic liver biopsy on 2016 with benign pathology. Surveillance CT scan by pulmonology for lung nodule noted increase in size of lung lesion (3.0x2.8cm) and also noted cirrhotic liver with a right liver lobe lesion (35y81p2.1cm). 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 Patient evaluated and felt not to be a surgical candidate for resection of lung or liver lesion. He has established with radiation oncology with plans to undergo SBRT of his lung lesion. [place] [facility] [place].      Review  of Systems   Constitutional: Negative for appetite change and unexpected weight change.   HENT: Negative for sore throat and trouble swallowing.    Eyes: Negative for visual disturbance.   Respiratory: Negative for cough and shortness of breath.    Cardiovascular: Negative for chest pain.   Gastrointestinal: Negative for abdominal pain, blood in stool, diarrhea, nausea and vomiting.   Genitourinary: Negative for dysuria, frequency and hematuria.   Musculoskeletal: Negative for back pain and myalgias.   Skin: Negative for rash.   Neurological: Negative for headaches.   Hematological: Negative for adenopathy. Bruises/bleeds easily.   Psychiatric/Behavioral: The patient is not nervous/anxious.        Allergies:  Review of patient's allergies indicates:   Allergen Reactions    Altace [ramipril] Anaphylaxis       Medications:  Current Outpatient Prescriptions   Medication Sig Dispense Refill    apixaban (ELIQUIS) 5 mg Tab Take 5 mg by mouth 2 (two) times daily.      aspirin 81 MG Chew Take 81 mg by mouth once daily.      calcium carbonate (OS-CAMILA) 600 mg (1,500 mg) Tab Take 600 mg by mouth 2 (two) times daily with meals.      finasteride (PROSCAR) 5 mg tablet       furosemide (LASIX) 40 MG tablet Take 40 mg by mouth once daily.      iron polysaccharides (NIFEREX) 150 mg iron Cap Take 150 mg by mouth 2 (two) times daily.      metformin (GLUCOPHAGE) 1000 MG tablet Take 1,000 mg by mouth 2 (two) times daily with meals.      metformin (GLUCOPHAGE) 500 MG tablet       MULTIVIT-IRON-MIN-FOLIC ACID 3,500-18-0.4 UNIT-MG-MG ORAL CHEW Take by mouth.      pantoprazole (PROTONIX) 40 MG tablet Take 40 mg by mouth once daily.      pravastatin (PRAVACHOL) 10 MG tablet Take 10 mg by mouth once daily.      tamsulosin (FLOMAX) 0.4 mg Cp24       TOPROL XL 25 mg 24 hr tablet       verapamil (VERELAN PM) 300 mg 24 hr capsule       vitamin D 1000 units Tab Take 2,000 Units by mouth once daily.       No current  facility-administered medications for this visit.        PMH:  Past Medical History:   Diagnosis Date    Anemia     Cancer     Diabetes mellitus     Encounter for blood transfusion     GI bleed 8/6/2003    History of knee replacement procedure of right knee 2007    Hypertension     Sleep apnea 12/18/15    Stroke 2014    Stroke 7/16/14    TIA (transient ischemic attack) 2/11/15       PSH:  Past Surgical History:   Procedure Laterality Date    APPENDECTOMY  teen    BONE MARROW BIOPSY      CATARACT EXTRACTION  12/10/11    COLONOSCOPY  12/21/15    KNEE SURGERY Right replacement    KNEE SURGERY Left        FamHx:  Family History   Problem Relation Age of Onset    Heart disease Mother     Diabetes Mother     Heart disease Father     Diabetes Father     No Known Problems Sister     Kidney disease Brother     No Known Problems Maternal Aunt     No Known Problems Maternal Uncle     No Known Problems Paternal Aunt     No Known Problems Paternal Uncle     No Known Problems Maternal Grandmother     No Known Problems Maternal Grandfather     Cancer Paternal Grandmother     No Known Problems Paternal Grandfather     Heart disease Brother     Esophageal cancer Brother     Lymphoma Brother     Stomach cancer Brother     Diabetes Daughter        SocHx:  Social History     Social History    Marital status:      Spouse name: N/A    Number of children: N/A    Years of education: N/A     Occupational History    Not on file.     Social History Main Topics    Smoking status: Never Smoker    Smokeless tobacco: Never Used    Alcohol use No    Drug use: No    Sexual activity: Not Currently     Partners: Female     Birth control/ protection: None     Other Topics Concern    Not on file     Social History Narrative    No narrative on file       Distress Score    Distress Score: 0        Practical Problems Physical Problems   : No Appearance: No   Housing: No Bathing / Dressing: No    Insurance / Financial: No Breathing: No    Transportation: No  Changes in Urination: No    Work / School: No  Constipation: No   Treatment Decisions: No  Diarrhea: No     Eating: No    Family Problems Fatigue: No    Dealing with Children: No Feeling Swollen: No    Dealing with Partner: No Fevers: No    Ability to Have Children: No  Getting Around: No    Family Health Issues: No  Indigestion: No     Memory / Concentration: No   Emotional Problems Mouth Sores: No    Depression: No  Nausea: No    Fears: No  Nose Dry / Congested: No    Nervousness: No  Pain: No    Sadness: No Sexual: No    Worry: No Skin Dry / Itchy: No    Loss of Interest in Usual Activities: No Sleep: No     Substance Abuse: No    Spiritual/Religions Concerns Tingling in Hands / Feet: No   Spritual / Oriental orthodox Concerns: No         Other Problems            Objective:      Physical Exam   Constitutional: He is oriented to person, place, and time. He appears well-developed and well-nourished. No distress.   HENT:   Head: Normocephalic and atraumatic.   Right Ear: External ear normal.   Left Ear: External ear normal.   Eyes: EOM are normal. Pupils are equal, round, and reactive to light. Right eye exhibits no discharge. Left eye exhibits no discharge.   Neck: Normal range of motion. No tracheal deviation present. No thyromegaly present.   Cardiovascular: Normal rate and regular rhythm.  Exam reveals no friction rub.    No murmur heard.  Pulmonary/Chest: Effort normal and breath sounds normal. No stridor. No respiratory distress. He has no wheezes.   Abdominal: Soft. Bowel sounds are normal. He exhibits no distension. There is no tenderness. There is no guarding.   Musculoskeletal: Normal range of motion. He exhibits no edema or deformity.   Neurological: He is alert and oriented to person, place, and time.   Skin: Skin is warm and dry. He is not diaphoretic.   Psychiatric: He has a normal mood and affect. His behavior is normal.                    Assessment:       1. HCC (hepatocellular carcinoma)    2. Adenocarcinoma of lung, unspecified laterality        Plan:       1. Lung Adenocarcioma in situ  2. HCC  - 2 primary cancers and deemed not a surgical candidate  - established with radiation oncology from OSH with plans to start XRT (SBRT?) soon  - will check AFP here to confirm HCC (otherwise will send for pathology review of slides here)  - will refer to IR to discuss chemoradiation  - will tentatively schedule patient for follow up appointment in 1 week to help ensure proper follow up  - otherwise, patient will continue oncologic care with his local oncologist (Dr. Mckeon)    PLAN: AFP today, possible review of pathology slides if AFP<400. IR referral for chemoembolization. RTC 1wk to ensure pt is not lost to f/u.    Mt Gomez MD (PGY-5)  Hematology/Oncology Fellow  Will discuss with Dr. Alas (Hematology/Oncology Staff)  Distress Screening Results: Psychosocial Distress screening score of Distress Score: 0 noted and reviewed. No intervention indicated.           ATTENDING NOTE, ONCOLOGY CLINIC    As above.  Patient seen and examined, chart reviewed, radiologic studies reviewed with radiologist.  The patient was referred for chemo embolization of his presumptive HCC, however, he ended up in our Clinic  Appears comfortable, in NAD.  Lungs are clear to auscultation.  Abdomen is soft, nontender.  Labs reviewed.    PLAN  We will check his AFP today.  If it is above 400, there is no need to review his liver biopsy here, otehrwise, it will need to be reviewed.  Patient to sign a release of information so that the pathology slides both from the liver biopsy AND the lung biopsy can be reviewed here.  I will ask Yudelka Bean and [facility] to evaluate, pending confirmation of the histologic diagnosis of HCC.  The above have been explained to him and his wife.  RTC 1 or 2 weeks, depending on the availability of the biopsy results.  Patient will  call prior to driving here from [place] to find out whether his slides have been reviewed.  His multiple questions were answered to his satisfaction.      Jesús Alas MD

## 2018-02-09 NOTE — TELEPHONE ENCOUNTER
Called and spoke to MRI/radiology who saide knee replacements are safe. Called and spoke to patient's wife at 2:47 PM 02/09/2018, informed her that knee replacements should be safe according to MRI department. Would recommend making sure they are aware upon arrival but there should not be any issues. All questions answered.

## 2018-02-12 ENCOUNTER — HOSPITAL ENCOUNTER (OUTPATIENT)
Dept: RADIOLOGY | Facility: HOSPITAL | Age: 78
Discharge: HOME | End: 2018-02-12
Attending: STUDENT IN AN ORGANIZED HEALTH CARE EDUCATION/TRAINING PROGRAM
Payer: MEDICARE

## 2018-02-12 DIAGNOSIS — D49.0 NEOPLASM OF LIVER: ICD-10-CM

## 2018-02-12 LAB
CREAT SERPL-MCNC: 1.1 MG/DL (ref 0.5–1.4)
SAMPLE: NORMAL

## 2018-02-12 PROCEDURE — 25500020 PHARM REV CODE 255: Performed by: STUDENT IN AN ORGANIZED HEALTH CARE EDUCATION/TRAINING PROGRAM

## 2018-02-12 PROCEDURE — A9577 INJ MULTIHANCE: HCPCS | Performed by: STUDENT IN AN ORGANIZED HEALTH CARE EDUCATION/TRAINING PROGRAM

## 2018-02-12 PROCEDURE — 74183 MRI ABD W/O CNTR FLWD CNTR: CPT | Mod: 26,GC,, | Performed by: RADIOLOGY

## 2018-02-12 PROCEDURE — 74183 MRI ABD W/O CNTR FLWD CNTR: CPT | Mod: TC

## 2018-02-12 RX ADMIN — GADOBENATE DIMEGLUMINE 10 ML: 529 INJECTION, SOLUTION INTRAVENOUS at 12:02

## 2018-02-14 ENCOUNTER — PATIENT MESSAGE (OUTPATIENT)
Dept: HEMATOLOGY/ONCOLOGY | Facility: CLINIC | Age: 78
End: 2018-02-14

## 2018-02-15 ENCOUNTER — TELEPHONE (OUTPATIENT)
Dept: HEMATOLOGY/ONCOLOGY | Facility: CLINIC | Age: 78
End: 2018-02-15

## 2018-02-15 NOTE — TELEPHONE ENCOUNTER
Called and spoke to patient/wife. Informed them that request was faxed last week. However, if they are able to obtain the pathology slides, then they should bring them tomorrow to their appointment. They will come to appointment tomorrow.

## 2018-02-16 ENCOUNTER — OFFICE VISIT (OUTPATIENT)
Dept: HEMATOLOGY/ONCOLOGY | Facility: CLINIC | Age: 78
End: 2018-02-16
Payer: MEDICARE

## 2018-02-16 ENCOUNTER — LAB VISIT (OUTPATIENT)
Dept: LAB | Facility: HOSPITAL | Age: 78
End: 2018-02-16
Attending: INTERNAL MEDICINE
Payer: MEDICARE

## 2018-02-16 VITALS
SYSTOLIC BLOOD PRESSURE: 125 MMHG | DIASTOLIC BLOOD PRESSURE: 60 MMHG | HEART RATE: 73 BPM | HEIGHT: 64 IN | TEMPERATURE: 98 F | BODY MASS INDEX: 42.98 KG/M2 | WEIGHT: 251.75 LBS

## 2018-02-16 DIAGNOSIS — C34.90 ADENOCARCINOMA OF LUNG, UNSPECIFIED LATERALITY: ICD-10-CM

## 2018-02-16 DIAGNOSIS — C22.0 HCC (HEPATOCELLULAR CARCINOMA): ICD-10-CM

## 2018-02-16 DIAGNOSIS — K76.9 LIVER LESION: ICD-10-CM

## 2018-02-16 DIAGNOSIS — R16.0 LIVER MASS: Primary | ICD-10-CM

## 2018-02-16 DIAGNOSIS — C22.0 HEPATOCELLULAR CARCINOMA: ICD-10-CM

## 2018-02-16 LAB
AFP SERPL-MCNC: 2.1 NG/ML
ALBUMIN SERPL BCP-MCNC: 3.4 G/DL
ALP SERPL-CCNC: 69 U/L
ALT SERPL W/O P-5'-P-CCNC: 14 U/L
ANION GAP SERPL CALC-SCNC: 10 MMOL/L
AST SERPL-CCNC: 15 U/L
BASOPHILS # BLD AUTO: 0.04 K/UL
BASOPHILS NFR BLD: 0.4 %
BILIRUB SERPL-MCNC: 0.9 MG/DL
BUN SERPL-MCNC: 16 MG/DL
CALCIUM SERPL-MCNC: 9.7 MG/DL
CHLORIDE SERPL-SCNC: 105 MMOL/L
CO2 SERPL-SCNC: 23 MMOL/L
CREAT SERPL-MCNC: 1.1 MG/DL
DIFFERENTIAL METHOD: ABNORMAL
EOSINOPHIL # BLD AUTO: 0.4 K/UL
EOSINOPHIL NFR BLD: 4.4 %
ERYTHROCYTE [DISTWIDTH] IN BLOOD BY AUTOMATED COUNT: 17.8 %
EST. GFR  (AFRICAN AMERICAN): >60 ML/MIN/1.73 M^2
EST. GFR  (NON AFRICAN AMERICAN): >60 ML/MIN/1.73 M^2
GLUCOSE SERPL-MCNC: 103 MG/DL
HCT VFR BLD AUTO: 35.5 %
HGB BLD-MCNC: 11.4 G/DL
IMM GRANULOCYTES # BLD AUTO: 0.03 K/UL
IMM GRANULOCYTES NFR BLD AUTO: 0.3 %
INR PPP: 1.3
LYMPHOCYTES # BLD AUTO: 0.6 K/UL
LYMPHOCYTES NFR BLD: 7 %
MCH RBC QN AUTO: 27.6 PG
MCHC RBC AUTO-ENTMCNC: 32.1 G/DL
MCV RBC AUTO: 86 FL
MONOCYTES # BLD AUTO: 1.1 K/UL
MONOCYTES NFR BLD: 11.8 %
NEUTROPHILS # BLD AUTO: 6.8 K/UL
NEUTROPHILS NFR BLD: 76.1 %
NRBC BLD-RTO: 0 /100 WBC
PLATELET # BLD AUTO: 149 K/UL
PMV BLD AUTO: 11.6 FL
POTASSIUM SERPL-SCNC: 5.1 MMOL/L
PROT SERPL-MCNC: 6.4 G/DL
PROTHROMBIN TIME: 13.5 SEC
RBC # BLD AUTO: 4.13 M/UL
SODIUM SERPL-SCNC: 138 MMOL/L
WBC # BLD AUTO: 8.99 K/UL

## 2018-02-16 PROCEDURE — 88321 CONSLTJ&REPRT SLD PREP ELSWR: CPT | Mod: ,,, | Performed by: PATHOLOGY

## 2018-02-16 PROCEDURE — 1159F MED LIST DOCD IN RCRD: CPT | Mod: GC,,, | Performed by: STUDENT IN AN ORGANIZED HEALTH CARE EDUCATION/TRAINING PROGRAM

## 2018-02-16 PROCEDURE — 85025 COMPLETE CBC W/AUTO DIFF WBC: CPT

## 2018-02-16 PROCEDURE — 99214 OFFICE O/P EST MOD 30 MIN: CPT | Mod: S$PBB,GC,, | Performed by: STUDENT IN AN ORGANIZED HEALTH CARE EDUCATION/TRAINING PROGRAM

## 2018-02-16 PROCEDURE — 99999 PR PBB SHADOW E&M-EST. PATIENT-LVL III: CPT | Mod: PBBFAC,GC,, | Performed by: STUDENT IN AN ORGANIZED HEALTH CARE EDUCATION/TRAINING PROGRAM

## 2018-02-16 PROCEDURE — 85610 PROTHROMBIN TIME: CPT

## 2018-02-16 PROCEDURE — 82105 ALPHA-FETOPROTEIN SERUM: CPT

## 2018-02-16 PROCEDURE — 80053 COMPREHEN METABOLIC PANEL: CPT

## 2018-02-16 PROCEDURE — 1126F AMNT PAIN NOTED NONE PRSNT: CPT | Mod: GC,,, | Performed by: STUDENT IN AN ORGANIZED HEALTH CARE EDUCATION/TRAINING PROGRAM

## 2018-02-16 PROCEDURE — 36415 COLL VENOUS BLD VENIPUNCTURE: CPT

## 2018-02-16 PROCEDURE — 99213 OFFICE O/P EST LOW 20 MIN: CPT | Mod: PBBFAC | Performed by: STUDENT IN AN ORGANIZED HEALTH CARE EDUCATION/TRAINING PROGRAM

## 2018-02-16 NOTE — PROGRESS NOTES
Subjective:       Patient ID: Hossein Sanchez Sr. is a 77 y.o. male.    Chief Complaint: No chief complaint on file.    Patient is a 78yo WM with PMHx of HTN, HLD, Afib (on eliquis), CVA, and newly diagnosed lung and liver cancer. Patient presents today for follow up [place] [facility] [place]. He brings in his pathology slides for review. Today he reports feeling well. He denies any pain. He has chronic LE edema, but no changes in abdominal girth/distention. He denies any fevers, chills, night sweats, appetite change or weight loss. He denies nausea/vomiting or changes in bowel habit. Patient notes ARTEAGA, but he denies chest pain, palpitations, cough. No other complaints.     ECO    Oncologic History:  Abdominal pain in 2016 led to CT imaging and laparoscopic liver biopsy on 2016 with benign pathology. Surveillance CT scan by pulmonology for lung nodule noted increase in size of lung lesion (3.0x2.8cm) and also noted cirrhotic liver with a right liver lobe lesion ([id number]). 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 Patient evaluated and felt not to be a surgical candidate for resection of lung or liver lesion. He has established with radiation oncology with plans to undergo SBRT of his lung lesion. [place] [facility] [place]. Scheduled for IR appointmetn on 3/1/18      Review of Systems   Constitutional: Negative for appetite change, fatigue, fever and unexpected weight change.   HENT: Negative for sore throat and trouble swallowing.    Eyes: Negative for visual disturbance.   Respiratory: Negative for cough and shortness of breath.    Cardiovascular: Negative for chest pain.   Gastrointestinal: Negative for abdominal pain, blood in stool, diarrhea, nausea and vomiting.   Genitourinary: Negative for dysuria, frequency and hematuria.   Musculoskeletal: Negative for back pain and  myalgias.   Skin: Negative for rash.   Neurological: Negative for headaches.   Hematological: Negative for adenopathy. Bruises/bleeds easily.   Psychiatric/Behavioral: The patient is not nervous/anxious.        Allergies:  Review of patient's allergies indicates:   Allergen Reactions    Altace [ramipril] Anaphylaxis       Medications:  Current Outpatient Prescriptions   Medication Sig Dispense Refill    apixaban (ELIQUIS) 5 mg Tab Take 5 mg by mouth 2 (two) times daily.      aspirin 81 MG Chew Take 81 mg by mouth once daily.      calcium carbonate (OS-CAMILA) 600 mg (1,500 mg) Tab Take 600 mg by mouth 2 (two) times daily with meals.      finasteride (PROSCAR) 5 mg tablet       furosemide (LASIX) 40 MG tablet Take 40 mg by mouth once daily.      iron polysaccharides (NIFEREX) 150 mg iron Cap Take 150 mg by mouth 2 (two) times daily.      metformin (GLUCOPHAGE) 1000 MG tablet Take 1,000 mg by mouth 2 (two) times daily with meals.      metformin (GLUCOPHAGE) 500 MG tablet       MULTIVIT-IRON-MIN-FOLIC ACID 3,500-18-0.4 UNIT-MG-MG ORAL CHEW Take by mouth.      pantoprazole (PROTONIX) 40 MG tablet Take 40 mg by mouth once daily.      pravastatin (PRAVACHOL) 10 MG tablet Take 10 mg by mouth once daily.      tamsulosin (FLOMAX) 0.4 mg Cp24       TOPROL XL 25 mg 24 hr tablet       verapamil (VERELAN PM) 300 mg 24 hr capsule       vitamin D 1000 units Tab Take 2,000 Units by mouth once daily.       No current facility-administered medications for this visit.        PMH:  Past Medical History:   Diagnosis Date    Anemia     Cancer     Diabetes mellitus     Encounter for blood transfusion     GI bleed 8/6/2003    History of knee replacement procedure of right knee 2007    Hypertension     Sleep apnea 12/18/15    Stroke 2014    Stroke 7/16/14    TIA (transient ischemic attack) 2/11/15       PSH:  Past Surgical History:   Procedure Laterality Date    APPENDECTOMY  teen    BONE MARROW BIOPSY       CATARACT EXTRACTION  12/10/11    COLONOSCOPY  12/21/15    KNEE SURGERY Right replacement    KNEE SURGERY Left        FamHx:  Family History   Problem Relation Age of Onset    Heart disease Mother     Diabetes Mother     Heart disease Father     Diabetes Father     No Known Problems Sister     Kidney disease Brother     No Known Problems Maternal Aunt     No Known Problems Maternal Uncle     No Known Problems Paternal Aunt     No Known Problems Paternal Uncle     No Known Problems Maternal Grandmother     No Known Problems Maternal Grandfather     Cancer Paternal Grandmother     No Known Problems Paternal Grandfather     Heart disease Brother     Esophageal cancer Brother     Lymphoma Brother     Stomach cancer Brother     Diabetes Daughter        SocHx:  Social History     Social History    Marital status:      Spouse name: N/A    Number of children: N/A    Years of education: N/A     Occupational History    Not on file.     Social History Main Topics    Smoking status: Never Smoker    Smokeless tobacco: Never Used    Alcohol use No    Drug use: No    Sexual activity: Not Currently     Partners: Female     Birth control/ protection: None     Other Topics Concern    Not on file     Social History Narrative    No narrative on file       Objective:      Physical Exam   Constitutional: He is oriented to person, place, and time. He appears well-developed and well-nourished. No distress.   HENT:   Head: Normocephalic and atraumatic.   Right Ear: External ear normal.   Left Ear: External ear normal.   Eyes: EOM are normal. Pupils are equal, round, and reactive to light. Right eye exhibits no discharge. Left eye exhibits no discharge.   Neck: Normal range of motion. No tracheal deviation present. No thyromegaly present.   Cardiovascular: Normal rate and regular rhythm.  Exam reveals no friction rub.    No murmur heard.  Pulmonary/Chest: Effort normal and breath sounds normal. No  stridor. No respiratory distress. He has no wheezes.   Abdominal: Soft. Bowel sounds are normal. He exhibits no distension. There is no tenderness. There is no guarding.   Musculoskeletal: Normal range of motion. He exhibits no edema or deformity.   Neurological: He is alert and oriented to person, place, and time.   Skin: Skin is warm and dry. He is not diaphoretic.   Psychiatric: He has a normal mood and affect. His behavior is normal.          MRI abdo 2/12/18:  Heterogeneously enhancing masses throughout the right hepatic lobe consistent with neoplasm in this patient with a history of biopsy-proven HCC (clear cell variant).  Additional subtle focus of enhancement at the anterior subcapsular aspect of hepatic segment II which is indeterminate, however may represent additional tumor.    Splenomegaly.    Mild cardiomegaly.                Assessment:       1. Liver mass    2. Liver lesion        Plan:       1. Lung Adenocarcioma in situ  2. HCC  - 2 primary cancers and deemed not a surgical candidate  - established with Radiation Oncology from OSH with plans to start XRT (SBRT?) soon  - AFP wnl (2.1)   - Pathology will review outside pathology slides to confirm HCC diagnosis  - patient will see IR appointment as scheduled (3/1/18) to discuss chemoembolization   - will schedule patient for appointment in 1 month to follow up after anticipated chemoembolization   - otherwise, patient will continue oncologic care with his local oncologist (Dr. Mckeon)    PLAN: Review of outside pathology slides. IR appointment for chemoembolization. RTC 1mo for f/u    Mt Gomez MD (PGY-5)  Hematology/Oncology Fellow  Will discuss with Dr. Alas (Hematology/Oncology Staff)      ATTENDING NOTE, ONCOLOGY CLINIC    As above.  Patient seen and examined, chart reviewed.  Appears comfortable, in NAD.  Lungs are clear to auscultation.  Abdomen is soft, nontender.  Labs reviewed.    PLAN  We will submit his slides to our Pathology  Department for interpretation, as the patient delivered them to us today.  He will see the IR physicians on March 1st, and assuming he will undergo TACE the first part of March, we will see him 4 weeks from now.  His multiple questions were answered to his satisfaction.      Jesús Alas MD

## 2018-02-23 ENCOUNTER — PATIENT MESSAGE (OUTPATIENT)
Dept: HEMATOLOGY/ONCOLOGY | Facility: CLINIC | Age: 78
End: 2018-02-23

## 2018-02-26 ENCOUNTER — TELEPHONE (OUTPATIENT)
Dept: HEMATOLOGY/ONCOLOGY | Facility: CLINIC | Age: 78
End: 2018-02-26

## 2018-02-26 NOTE — TELEPHONE ENCOUNTER
Called and spoke with patient 9:33 AM 02/26/2018.  Informed him pathology results remain in process. Advised him to keep IR appointment 3/1/18. All questions answered.

## 2018-03-01 ENCOUNTER — INITIAL CONSULT (OUTPATIENT)
Dept: INTERVENTIONAL RADIOLOGY/VASCULAR | Facility: CLINIC | Age: 78
End: 2018-03-01
Payer: MEDICARE

## 2018-03-01 VITALS
BODY MASS INDEX: 42.54 KG/M2 | HEIGHT: 64 IN | WEIGHT: 249.19 LBS | SYSTOLIC BLOOD PRESSURE: 157 MMHG | DIASTOLIC BLOOD PRESSURE: 85 MMHG | HEART RATE: 83 BPM

## 2018-03-01 DIAGNOSIS — K76.9 LIVER LESION: Primary | ICD-10-CM

## 2018-03-01 PROCEDURE — 99213 OFFICE O/P EST LOW 20 MIN: CPT | Mod: PBBFAC

## 2018-03-01 PROCEDURE — 99999 PR PBB SHADOW E&M-EST. PATIENT-LVL III: CPT | Mod: PBBFAC,,,

## 2018-03-01 PROCEDURE — 99214 OFFICE O/P EST MOD 30 MIN: CPT | Mod: S$PBB,,, | Performed by: FAMILY MEDICINE

## 2018-03-01 NOTE — PROGRESS NOTES
Subjective:       Patient ID: Hossein Sanchez Sr. is a 77 y.o. male.    Chief Complaint: Cancer    Patient here to discuss treatment of his liver lesion recently biopsied in November 2017 at an outside facility: clear cell neoplasm. He reports feeling well. He denies any abdominal pain or distention. He is accompanied by wife and daughter. He had an MRI on 2/12/2018.      Review of Systems   Constitutional: Positive for activity change and fatigue. Negative for appetite change, chills and fever.   HENT: Negative for congestion, drooling, ear discharge, postnasal drip, sneezing and trouble swallowing.    Eyes: Negative for pain, discharge, redness and itching.   Respiratory: Negative for cough, shortness of breath, wheezing and stridor.    Cardiovascular: Negative for chest pain, palpitations and leg swelling.   Gastrointestinal: Negative for abdominal distention, abdominal pain, constipation, diarrhea, nausea and vomiting.   Genitourinary: Negative for difficulty urinating, dysuria, frequency and urgency.   Musculoskeletal: Negative for arthralgias, back pain, gait problem, joint swelling and myalgias.   Skin: Negative for color change, pallor and rash.   Neurological: Negative for dizziness, weakness and headaches.       Objective:      Physical Exam   Constitutional: He is oriented to person, place, and time. He appears well-developed and well-nourished. No distress.   HENT:   Head: Normocephalic and atraumatic.   Right Ear: External ear normal.   Left Ear: External ear normal.   Nose: Nose normal.   Mouth/Throat: Oropharynx is clear and moist. No oropharyngeal exudate.   Eyes: Conjunctivae are normal. Pupils are equal, round, and reactive to light. Right eye exhibits no discharge. Left eye exhibits no discharge. No scleral icterus.   Neck: Neck supple. No tracheal deviation present. No thyromegaly present.   Cardiovascular: Normal rate, regular rhythm, normal heart sounds and intact distal pulses.  Exam reveals no  gallop and no friction rub.    No murmur heard.  Pulmonary/Chest: Effort normal and breath sounds normal. No stridor. No respiratory distress. He has no wheezes. He has no rales.   Abdominal: Soft. Bowel sounds are normal. He exhibits no distension and no mass. There is no hepatosplenomegaly. There is no tenderness. There is no rebound and no guarding.   Musculoskeletal: He exhibits no edema.   Lymphadenopathy:     He has no cervical adenopathy.   Neurological: He is alert and oriented to person, place, and time. Gait normal.   Skin: Skin is warm and dry. He is not diaphoretic. No cyanosis. Nails show no clubbing.   Psychiatric: He has a normal mood and affect.   Vitals reviewed.      Assessment:       1. Liver lesion        Plan:         Explained recommendation of Dr. Lacy is to treat with radioembolization. Radioembolization discussed in detail with the patient including risks, benefits, potential complications, usual pre and post procedure course.  Discussed the need for initial Angiogram mapping study prior to scheduling the actual Radioembolization procedure. Utilized images from patient's MRI, images from the internet, and illustrations to help explain procedure. Patient and family verbalized understanding and agreement. Patient scheduled for mapping on 3/8/2018. Pre-procedure handout with clinic phone number provided.

## 2018-03-07 DIAGNOSIS — K76.9 LIVER LESION: Primary | ICD-10-CM

## 2018-03-08 ENCOUNTER — HOSPITAL ENCOUNTER (OUTPATIENT)
Facility: HOSPITAL | Age: 78
Discharge: HOME | End: 2018-03-08
Attending: INTERNAL MEDICINE | Admitting: INTERNAL MEDICINE
Payer: MEDICARE

## 2018-03-08 ENCOUNTER — HOSPITAL ENCOUNTER (OUTPATIENT)
Dept: RADIOLOGY | Facility: HOSPITAL | Age: 78
Discharge: HOME | End: 2018-03-08
Attending: FAMILY MEDICINE | Admitting: INTERNAL MEDICINE
Payer: MEDICARE

## 2018-03-08 ENCOUNTER — SURGERY (OUTPATIENT)
Age: 78
End: 2018-03-08

## 2018-03-08 VITALS
RESPIRATION RATE: 20 BRPM | DIASTOLIC BLOOD PRESSURE: 82 MMHG | TEMPERATURE: 97 F | HEIGHT: 64 IN | BODY MASS INDEX: 42.34 KG/M2 | HEART RATE: 81 BPM | SYSTOLIC BLOOD PRESSURE: 156 MMHG | WEIGHT: 248 LBS | OXYGEN SATURATION: 98 %

## 2018-03-08 DIAGNOSIS — K76.9 LIVER LESION: ICD-10-CM

## 2018-03-08 LAB — POCT GLUCOSE: 119 MG/DL (ref 70–110)

## 2018-03-08 PROCEDURE — 63600175 PHARM REV CODE 636 W HCPCS: Performed by: FAMILY MEDICINE

## 2018-03-08 PROCEDURE — 25500020 PHARM REV CODE 255: Performed by: INTERNAL MEDICINE

## 2018-03-08 PROCEDURE — 78201 LIVER IMAGING STATIC ONLY: CPT | Mod: TC

## 2018-03-08 PROCEDURE — 25000003 PHARM REV CODE 250: Performed by: FAMILY MEDICINE

## 2018-03-08 PROCEDURE — 63600175 PHARM REV CODE 636 W HCPCS: Performed by: RADIOLOGY

## 2018-03-08 PROCEDURE — 78201 LIVER IMAGING STATIC ONLY: CPT | Mod: 26,,, | Performed by: RADIOLOGY

## 2018-03-08 PROCEDURE — 82962 GLUCOSE BLOOD TEST: CPT

## 2018-03-08 RX ORDER — MIDAZOLAM HYDROCHLORIDE 1 MG/ML
1 INJECTION INTRAMUSCULAR; INTRAVENOUS
Status: DISCONTINUED | OUTPATIENT
Start: 2018-03-08 | End: 2018-03-08 | Stop reason: HOSPADM

## 2018-03-08 RX ORDER — FENTANYL CITRATE 50 UG/ML
INJECTION, SOLUTION INTRAMUSCULAR; INTRAVENOUS CODE/TRAUMA/SEDATION MEDICATION
Status: COMPLETED | OUTPATIENT
Start: 2018-03-08 | End: 2018-03-08

## 2018-03-08 RX ORDER — MIDAZOLAM HYDROCHLORIDE 1 MG/ML
INJECTION INTRAMUSCULAR; INTRAVENOUS CODE/TRAUMA/SEDATION MEDICATION
Status: COMPLETED | OUTPATIENT
Start: 2018-03-08 | End: 2018-03-08

## 2018-03-08 RX ORDER — HEPARIN SODIUM 200 [USP'U]/100ML
500 INJECTION, SOLUTION INTRAVENOUS CONTINUOUS
Status: DISCONTINUED | OUTPATIENT
Start: 2018-03-08 | End: 2018-03-08 | Stop reason: HOSPADM

## 2018-03-08 RX ORDER — SODIUM CHLORIDE 9 MG/ML
INJECTION, SOLUTION INTRAVENOUS CONTINUOUS
Status: DISCONTINUED | OUTPATIENT
Start: 2018-03-08 | End: 2018-03-08 | Stop reason: HOSPADM

## 2018-03-08 RX ORDER — FENTANYL CITRATE 50 UG/ML
50 INJECTION, SOLUTION INTRAMUSCULAR; INTRAVENOUS
Status: DISCONTINUED | OUTPATIENT
Start: 2018-03-08 | End: 2018-03-08 | Stop reason: HOSPADM

## 2018-03-08 RX ORDER — LIDOCAINE HYDROCHLORIDE 10 MG/ML
1 INJECTION, SOLUTION EPIDURAL; INFILTRATION; INTRACAUDAL; PERINEURAL ONCE AS NEEDED
Status: DISCONTINUED | OUTPATIENT
Start: 2018-03-08 | End: 2018-03-08 | Stop reason: HOSPADM

## 2018-03-08 RX ADMIN — FENTANYL CITRATE 50 MCG: 50 INJECTION, SOLUTION INTRAMUSCULAR; INTRAVENOUS at 10:03

## 2018-03-08 RX ADMIN — MIDAZOLAM HYDROCHLORIDE 1 MG: 1 INJECTION, SOLUTION INTRAMUSCULAR; INTRAVENOUS at 10:03

## 2018-03-08 RX ADMIN — MIDAZOLAM HYDROCHLORIDE 0.5 MG: 1 INJECTION, SOLUTION INTRAMUSCULAR; INTRAVENOUS at 11:03

## 2018-03-08 RX ADMIN — SODIUM CHLORIDE 3 G: 9 INJECTION, SOLUTION INTRAVENOUS at 08:03

## 2018-03-08 RX ADMIN — IOHEXOL 160 ML: 300 INJECTION, SOLUTION INTRAVENOUS at 11:03

## 2018-03-08 RX ADMIN — FENTANYL CITRATE 50 MCG: 50 INJECTION, SOLUTION INTRAMUSCULAR; INTRAVENOUS at 11:03

## 2018-03-08 NOTE — PROGRESS NOTES
Pt arrived to [facility] 4 s/p pre-yttrium.  NAD noted.  Report received from FRANCISCO JAVIER Rubalcava.  DSG to right groin CDI.  Will continue to monitor.

## 2018-03-08 NOTE — PROGRESS NOTES
Pt arrived to IR room 189 for Y90 mapping, no acute distress noted. Orders, consent and labs reviewed on chart.

## 2018-03-08 NOTE — PROGRESS NOTES
Pt given discharge instructions and handout.  Verbalized understanding.  Wife at bedside.  Dsg to right groin CDI.  IV d/c'd with cath tip intact.  NAD noted.  Pt to use wheelchair for discharge.

## 2018-03-08 NOTE — PROCEDURES
Radiology Post-Procedure Note    Pre Op Diagnosis: Hepatocellular carcinoma variant    Post Op Diagnosis: Hepatocellular carcinoma variant    Procedure: Y90 mapping    Procedure Performed by: Charles Lacy MD    Written Informed Consent Obtained: Yes    Specimen Removed: None    Estimated Blood Loss: Minimal    Findings:     After placement of a right femoral artery sheath, a 5-Indonesian catheter was inserted and angiography of the celiac artery and superior mesenteric artery for anatomic evaluation and localization of hepatic tumor. Left hepatic artery angiogram was performed and showed tumor supply. A microcatheter was introduced into feeding arteries of a right hepatic lobe tumor MAA was injected at this loaction.  Post procedural angiography revealed no complications.    Right femoral artery angiogram was performed and the sheath was removed.  Hemostasis was achieved using exoseal technique.  There was no hematoma at the time of hemostasis.    The patient tolerated procedure well.  Please see Imaging report for further details.    Dao Manning MD  Radiology

## 2018-03-08 NOTE — SEDATION DOCUMENTATION
Hemostasis achieved via R groin with use of ExoSeal closure device. Patient to lie flat till 13:10

## 2018-03-08 NOTE — H&P
Radiology History & Physical      SUBJECTIVE:     Chief Complaint: Clear cell variant HCC, with lesions at the dome of the right lobe and in the right segments 5 and 6. Presents for radioembolization mapping.    History of Present Illness:  Hossein Sanchez Sr. is a 77 y.o. male who presents for radioembolization mapping.  Past Medical History:   Diagnosis Date    Anemia     Cancer     Diabetes mellitus     Encounter for blood transfusion     GI bleed 8/6/2003    Hypertension     Liver carcinoma     Sleep apnea 12/18/15    Stroke 2014    Stroke 7/16/14    TIA (transient ischemic attack) 2/11/15     Past Surgical History:   Procedure Laterality Date    APPENDECTOMY  teen    BONE MARROW BIOPSY      CATARACT EXTRACTION  12/10/11    CHOLECYSTECTOMY      COLONOSCOPY  12/21/15    JOINT REPLACEMENT Bilateral     knees    KNEE SURGERY Right replacement    KNEE SURGERY Left     TONSILLECTOMY         Home Meds:   Prior to Admission medications    Medication Sig Start Date End Date Taking? Authorizing Provider   calcium carbonate (OS-CAMILA) 600 mg (1,500 mg) Tab Take 600 mg by mouth 2 (two) times daily with meals.   Yes Historical Provider, MD   finasteride (PROSCAR) 5 mg tablet  5/13/16  Yes Historical Provider, MD   furosemide (LASIX) 40 MG tablet Take 40 mg by mouth once daily.   Yes Historical Provider, MD   iron polysaccharides (NIFEREX) 150 mg iron Cap Take 150 mg by mouth 2 (two) times daily.   Yes Historical Provider, MD   metformin (GLUCOPHAGE) 500 MG tablet Take 1,000 mg by mouth 2 (two) times daily with meals.  5/10/16  Yes Historical Provider, MD   MULTIVIT-IRON-MIN-FOLIC ACID 3,500-18-0.4 UNIT-MG-MG ORAL CHEW Take by mouth.   Yes Historical Provider, MD   pantoprazole (PROTONIX) 40 MG tablet Take 40 mg by mouth once daily.   Yes Historical Provider, MD   pravastatin (PRAVACHOL) 10 MG tablet Take 10 mg by mouth once daily.   Yes Historical Provider, MD   tamsulosin (FLOMAX) 0.4 mg Cp24  5/13/16  Yes  Historical Provider, MD   TOPROL XL 25 mg 24 hr tablet  5/10/16  Yes Historical Provider, MD   verapamil (VERELAN PM) 300 mg 24 hr capsule  5/10/16  Yes Historical Provider, MD   vitamin D 1000 units Tab Take 2,000 Units by mouth once daily.   Yes Historical Provider, MD   apixaban (ELIQUIS) 5 mg Tab Take 5 mg by mouth 2 (two) times daily.    Historical Provider, MD   aspirin 81 MG Chew Take 81 mg by mouth once daily.    Historical Provider, MD     Anticoagulants/Antiplatelets: aspirin and eliquis held 5 and 3 days respectively.    Allergies:   Review of patient's allergies indicates:   Allergen Reactions    Altace [ramipril] Anaphylaxis     Sedation History:  no adverse reactions    Review of Systems:   Hematological: no known coagulopathies  Respiratory: no shortness of breath  Cardiovascular: no chest pain  Gastrointestinal: no abdominal pain  Genito-Urinary: no dysuria  Musculoskeletal: negative, some generalized low energy  Neurological: no TIA or stroke symptoms         OBJECTIVE:     Vital Signs (Most Recent)  Temp: 98.4 °F (36.9 °C) (03/08/18 0813)  Pulse: 75 (03/08/18 0813)  Resp: 18 (03/08/18 0813)  BP: (!) 154/74 (03/08/18 0813)  SpO2: 99 % (03/08/18 0813)    Physical Exam:  ASA: 3  Mallampati: 2    Laboratory  Lab Results   Component Value Date    INR 1.2 03/08/2018       Lab Results   Component Value Date    WBC 8.98 03/08/2018    HGB 10.8 (L) 03/08/2018    HCT 34.4 (L) 03/08/2018    MCV 89 03/08/2018     03/08/2018      Lab Results   Component Value Date     (H) 03/08/2018     03/08/2018    K 4.5 03/08/2018     03/08/2018    CO2 26 03/08/2018    BUN 18 03/08/2018    CREATININE 1.1 03/08/2018    CALCIUM 9.4 03/08/2018    MG 1.6 03/28/2016    ALT 19 03/08/2018    AST 22 03/08/2018    ALBUMIN 3.7 03/08/2018    BILITOT 0.7 03/08/2018    BILIDIR 0.3 03/08/2018       ASSESSMENT/PLAN:     Sedation Plan: moderate  Patient will undergo Y90 mapping. T bili not elevated and albumin good  at 3.7. INR and platelets OK.    Dao Manning MD  Radiology

## 2018-03-08 NOTE — DISCHARGE INSTRUCTIONS
For questions or concerns call: ASHLEY MON-FRI 8 AM- 5PM [phone]. Radiology resident on call [phone].    For immediate concerns that are not emergent, you may call our radiology clinic at: [phone]

## 2018-03-12 DIAGNOSIS — K76.9 LIVER LESION: Primary | ICD-10-CM

## 2018-03-13 ENCOUNTER — PATIENT MESSAGE (OUTPATIENT)
Dept: HEMATOLOGY/ONCOLOGY | Facility: CLINIC | Age: 78
End: 2018-03-13

## 2018-03-19 ENCOUNTER — TELEPHONE (OUTPATIENT)
Dept: HEMATOLOGY/ONCOLOGY | Facility: CLINIC | Age: 78
End: 2018-03-19

## 2018-03-23 ENCOUNTER — OFFICE VISIT (OUTPATIENT)
Dept: HEMATOLOGY/ONCOLOGY | Facility: CLINIC | Age: 78
End: 2018-03-23
Payer: MEDICARE

## 2018-03-23 ENCOUNTER — LAB VISIT (OUTPATIENT)
Dept: LAB | Facility: HOSPITAL | Age: 78
End: 2018-03-23
Payer: MEDICARE

## 2018-03-23 VITALS
DIASTOLIC BLOOD PRESSURE: 67 MMHG | BODY MASS INDEX: 43.59 KG/M2 | RESPIRATION RATE: 18 BRPM | HEART RATE: 67 BPM | TEMPERATURE: 98 F | OXYGEN SATURATION: 97 % | WEIGHT: 254 LBS | SYSTOLIC BLOOD PRESSURE: 134 MMHG

## 2018-03-23 DIAGNOSIS — R91.8 MASS OF UPPER LOBE OF RIGHT LUNG: ICD-10-CM

## 2018-03-23 DIAGNOSIS — C22.0 HEPATOCELLULAR CARCINOMA: Primary | ICD-10-CM

## 2018-03-23 DIAGNOSIS — R16.0 LIVER MASS: ICD-10-CM

## 2018-03-23 LAB
ALBUMIN SERPL BCP-MCNC: 3.6 G/DL
ALP SERPL-CCNC: 79 U/L
ALT SERPL W/O P-5'-P-CCNC: 12 U/L
ANION GAP SERPL CALC-SCNC: 10 MMOL/L
AST SERPL-CCNC: 16 U/L
BASOPHILS # BLD AUTO: 0.04 K/UL
BASOPHILS NFR BLD: 0.4 %
BILIRUB SERPL-MCNC: 0.8 MG/DL
BUN SERPL-MCNC: 16 MG/DL
CALCIUM SERPL-MCNC: 9.1 MG/DL
CHLORIDE SERPL-SCNC: 105 MMOL/L
CO2 SERPL-SCNC: 25 MMOL/L
CREAT SERPL-MCNC: 1 MG/DL
DIFFERENTIAL METHOD: ABNORMAL
EOSINOPHIL # BLD AUTO: 0.3 K/UL
EOSINOPHIL NFR BLD: 3.2 %
ERYTHROCYTE [DISTWIDTH] IN BLOOD BY AUTOMATED COUNT: 17.8 %
EST. GFR  (AFRICAN AMERICAN): >60 ML/MIN/1.73 M^2
EST. GFR  (NON AFRICAN AMERICAN): >60 ML/MIN/1.73 M^2
GLUCOSE SERPL-MCNC: 111 MG/DL
HCT VFR BLD AUTO: 34.9 %
HGB BLD-MCNC: 11.2 G/DL
IMM GRANULOCYTES # BLD AUTO: 0.1 K/UL
IMM GRANULOCYTES NFR BLD AUTO: 1 %
INR PPP: 1.2
LYMPHOCYTES # BLD AUTO: 1.3 K/UL
LYMPHOCYTES NFR BLD: 12.9 %
MCH RBC QN AUTO: 28.1 PG
MCHC RBC AUTO-ENTMCNC: 32.1 G/DL
MCV RBC AUTO: 88 FL
MONOCYTES # BLD AUTO: 1.2 K/UL
MONOCYTES NFR BLD: 11.6 %
NEUTROPHILS # BLD AUTO: 7 K/UL
NEUTROPHILS NFR BLD: 70.9 %
NRBC BLD-RTO: 0 /100 WBC
PLATELET # BLD AUTO: 154 K/UL
PMV BLD AUTO: 10.8 FL
POTASSIUM SERPL-SCNC: 4.6 MMOL/L
PROT SERPL-MCNC: 6.7 G/DL
PROTHROMBIN TIME: 12.4 SEC
RBC # BLD AUTO: 3.98 M/UL
SODIUM SERPL-SCNC: 140 MMOL/L
WBC # BLD AUTO: 9.92 K/UL

## 2018-03-23 PROCEDURE — 85025 COMPLETE CBC W/AUTO DIFF WBC: CPT

## 2018-03-23 PROCEDURE — 80053 COMPREHEN METABOLIC PANEL: CPT

## 2018-03-23 PROCEDURE — 85610 PROTHROMBIN TIME: CPT

## 2018-03-23 PROCEDURE — 99213 OFFICE O/P EST LOW 20 MIN: CPT | Mod: PBBFAC | Performed by: STUDENT IN AN ORGANIZED HEALTH CARE EDUCATION/TRAINING PROGRAM

## 2018-03-23 PROCEDURE — 99214 OFFICE O/P EST MOD 30 MIN: CPT | Mod: S$PBB,GC,, | Performed by: STUDENT IN AN ORGANIZED HEALTH CARE EDUCATION/TRAINING PROGRAM

## 2018-03-23 PROCEDURE — 99999 PR PBB SHADOW E&M-EST. PATIENT-LVL III: CPT | Mod: PBBFAC,GC,, | Performed by: STUDENT IN AN ORGANIZED HEALTH CARE EDUCATION/TRAINING PROGRAM

## 2018-03-23 PROCEDURE — 36415 COLL VENOUS BLD VENIPUNCTURE: CPT

## 2018-03-23 NOTE — Clinical Note
Please help schedule patient for follow up appointment in mid-May with labs (cbc, cmp, inr, afp). Thanks.

## 2018-03-23 NOTE — PROGRESS NOTES
Subjective:       Patient ID: Hossein Sanchez Sr. is a 77 y.o. male.    Chief Complaint: Follow-up    Patient is a 76yo WM with PMHx of HTN, HLD, Afib (on eliquis), CVA, and newly diagnosed lung and liver cancer. Patient presents today for follow up after [place] [facility] (TACE vs Y-90). Since his last visit, he was able to establish care with Radiology and underwent mapping for Y-90 on 3/8/18 and is scheduled to undergo his first treatment next Thursday. Today he reports feeling well. He denies any pain. He has chronic LE edema, but no changes in abdominal girth/distention. He denies any fevers, chills, night sweats, appetite change or weight loss. He denies nausea/vomiting or changes in bowel habit. Patient notes ARTEAGA, but he denies chest pain, palpitations, cough. No other complaints.     ECO    Oncologic History:  Abdominal pain in 2016 led to CT imaging and laparoscopic liver biopsy on 2016 with benign pathology. Surveillance CT scan by pulmonology for lung nodule noted increase in size of lung lesion (3.0x2.8cm) and also noted cirrhotic liver with a right liver lobe lesion ([id number]). 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 Patient evaluated and felt not to be a surgical candidate for resection of lung or liver lesion. He has established with radiation oncology with plans to undergo SBRT of his lung lesion. [place] [facility] [place]. He established with IR 3/1/18 and underwent mapping for Y-90 on 3/8/18. Scheduled for first treatment on 3/29/18.      Review of Systems   Constitutional: Negative for appetite change, fatigue, fever and unexpected weight change.   HENT: Negative for sore throat and trouble swallowing.    Eyes: Negative for visual disturbance.   Respiratory: Negative for cough and shortness of breath.    Cardiovascular: Negative for chest pain.    Gastrointestinal: Negative for abdominal pain, blood in stool, diarrhea, nausea and vomiting.   Genitourinary: Negative for dysuria, frequency and hematuria.   Musculoskeletal: Negative for back pain and myalgias.   Skin: Negative for rash.   Neurological: Negative for headaches.   Hematological: Negative for adenopathy. Bruises/bleeds easily.   Psychiatric/Behavioral: The patient is not nervous/anxious.        Allergies:  Review of patient's allergies indicates:   Allergen Reactions    Altace [ramipril] Anaphylaxis       Medications:  Current Outpatient Prescriptions   Medication Sig Dispense Refill    apixaban (ELIQUIS) 5 mg Tab Take 5 mg by mouth 2 (two) times daily.      aspirin 81 MG Chew Take 81 mg by mouth once daily.      calcium carbonate (OS-CAMILA) 600 mg (1,500 mg) Tab Take 600 mg by mouth 2 (two) times daily with meals.      finasteride (PROSCAR) 5 mg tablet       furosemide (LASIX) 40 MG tablet Take 40 mg by mouth once daily.      iron polysaccharides (NIFEREX) 150 mg iron Cap Take 150 mg by mouth 2 (two) times daily.      metformin (GLUCOPHAGE) 500 MG tablet Take 1,000 mg by mouth 2 (two) times daily with meals.       MULTIVIT-IRON-MIN-FOLIC ACID 3,500-18-0.4 UNIT-MG-MG ORAL CHEW Take by mouth.      pantoprazole (PROTONIX) 40 MG tablet Take 40 mg by mouth once daily.      pravastatin (PRAVACHOL) 10 MG tablet Take 10 mg by mouth once daily.      tamsulosin (FLOMAX) 0.4 mg Cp24       TOPROL XL 25 mg 24 hr tablet       verapamil (VERELAN PM) 300 mg 24 hr capsule       vitamin D 1000 units Tab Take 2,000 Units by mouth once daily.       No current facility-administered medications for this visit.        PMH:  Past Medical History:   Diagnosis Date    Anemia     Cancer     Diabetes mellitus     Encounter for blood transfusion     GI bleed 8/6/2003    Hypertension     Liver carcinoma     Sleep apnea 12/18/15    Stroke 2014    Stroke 7/16/14    TIA (transient ischemic attack) 2/11/15        PSH:  Past Surgical History:   Procedure Laterality Date    APPENDECTOMY  teen    BONE MARROW BIOPSY      CATARACT EXTRACTION  12/10/11    CHOLECYSTECTOMY      COLONOSCOPY  12/21/15    JOINT REPLACEMENT Bilateral     knees    KNEE SURGERY Right replacement    KNEE SURGERY Left     TONSILLECTOMY         FamHx:  Family History   Problem Relation Age of Onset    Heart disease Mother     Diabetes Mother     Heart disease Father     Diabetes Father     No Known Problems Sister     Kidney disease Brother     No Known Problems Maternal Aunt     No Known Problems Maternal Uncle     No Known Problems Paternal Aunt     No Known Problems Paternal Uncle     No Known Problems Maternal Grandmother     No Known Problems Maternal Grandfather     Cancer Paternal Grandmother     No Known Problems Paternal Grandfather     Heart disease Brother     Esophageal cancer Brother     Lymphoma Brother     Stomach cancer Brother     Diabetes Daughter        SocHx:  Social History     Social History    Marital status:      Spouse name: N/A    Number of children: N/A    Years of education: N/A     Occupational History    Not on file.     Social History Main Topics    Smoking status: Never Smoker    Smokeless tobacco: Never Used    Alcohol use No    Drug use: No    Sexual activity: Not Currently     Partners: Female     Birth control/ protection: None     Other Topics Concern    Not on file     Social History Narrative    No narrative on file       Objective:      Physical Exam   Constitutional: He is oriented to person, place, and time. He appears well-developed and well-nourished. No distress.   HENT:   Head: Normocephalic and atraumatic.   Right Ear: External ear normal.   Left Ear: External ear normal.   Eyes: EOM are normal. Pupils are equal, round, and reactive to light. Right eye exhibits no discharge. Left eye exhibits no discharge.   Neck: Normal range of motion. No tracheal deviation  present. No thyromegaly present.   Cardiovascular: Normal rate and regular rhythm.  Exam reveals no friction rub.    No murmur heard.  Pulmonary/Chest: Effort normal and breath sounds normal. No stridor. No respiratory distress. He has no wheezes.   Abdominal: Soft. Bowel sounds are normal. He exhibits no distension. There is no tenderness. There is no guarding.   Musculoskeletal: Normal range of motion. He exhibits no edema or deformity.   Neurological: He is alert and oriented to person, place, and time.   Skin: Skin is warm and dry. He is not diaphoretic.   Psychiatric: He has a normal mood and affect. His behavior is normal.          MRI abdo 2/12/18:  Heterogeneously enhancing masses throughout the right hepatic lobe consistent with neoplasm in this patient with a history of biopsy-proven HCC (clear cell variant).  Additional subtle focus of enhancement at the anterior subcapsular aspect of hepatic segment II which is indeterminate, however may represent additional tumor.    Splenomegaly.    Mild cardiomegaly.                FINAL PATHOLOGIC DIAGNOSIS  OUTSIDE SLIDE REVIEW  1. ORIGINAL DATE OF PROCEDURE: 11/21/2017  LIVER MASS [OSR # [id number]] (NEEDLE BIOPSY):  Hepatocellular carcinoma, well differentiated with clear cell features  Background cirrhosis (stage 4 of 4)  2. ORIGINAL DATE OF PROCEDURE 12/5/2017  LUNG MASS, RIGHT UPPER LOBE [OSR# [id number]]:  At least atypical adenomatous hyperplasia, 1.5 millimeters  See comment  COMMENT: Sections show an atypical mucinous proliferation with lepidic pattern (no definite invasion). The  differentiation between atypical adenomatous hyperplasia and adenocarcinoma in situ is based on size of the  lesion; clinical correlation required. Dr. GABRIEL Lieberman has reviewed this case and concurs with the interpretation.  The findings in the lung are not histologically similar to the findings in the liver.      Assessment:       1. Hepatocellular carcinoma        Plan:       1.  Lung Adenocarcioma in situ  2. HCC  - 2 primary cancers and deemed not a surgical candidate  - established with Radiation Oncology from OSH with plans to start XRT for lung lesion but per patient will monitor for now until Y-90 liver therapy completed as lesion has not significantly changed per OSH tumor board discussion  - AFP wnl (2.1)   - patient established with IR appointment (3/1/18) and underwent mapping (3/8/18)  - scheduled for first Y-90 treatment 3/29/18  - patient will continue oncologic care with his local oncologist (Dr. Mckeon)    PLAN: Y-90 treatment with IR. Local oncology follow up with Dr. Mckeon. RTC in 1.5mo after Y-90 treatment for follow up and labs.    Mt Gomez MD (PGY-5)  Hematology/Oncology Fellow  Will discuss with Dr. Ahmadi (Hematology/Oncology Staff)

## 2018-03-28 DIAGNOSIS — K76.9 LIVER LESION: Primary | ICD-10-CM

## 2018-03-29 ENCOUNTER — HOSPITAL ENCOUNTER (OUTPATIENT)
Dept: RADIOLOGY | Facility: HOSPITAL | Age: 78
Discharge: HOME | End: 2018-03-29
Attending: FAMILY MEDICINE | Admitting: RADIOLOGY
Payer: MEDICARE

## 2018-03-29 ENCOUNTER — SURGERY (OUTPATIENT)
Age: 78
End: 2018-03-29

## 2018-03-29 ENCOUNTER — HOSPITAL ENCOUNTER (OUTPATIENT)
Facility: HOSPITAL | Age: 78
Discharge: HOME | End: 2018-03-29
Attending: RADIOLOGY | Admitting: RADIOLOGY
Payer: MEDICARE

## 2018-03-29 VITALS
TEMPERATURE: 97 F | BODY MASS INDEX: 41.83 KG/M2 | OXYGEN SATURATION: 100 % | HEIGHT: 64 IN | DIASTOLIC BLOOD PRESSURE: 89 MMHG | WEIGHT: 245 LBS | RESPIRATION RATE: 18 BRPM | HEART RATE: 83 BPM | SYSTOLIC BLOOD PRESSURE: 163 MMHG

## 2018-03-29 DIAGNOSIS — K76.9 LIVER LESION: ICD-10-CM

## 2018-03-29 DIAGNOSIS — K76.9 LIVER LESION: Primary | ICD-10-CM

## 2018-03-29 LAB — POCT GLUCOSE: 113 MG/DL (ref 70–110)

## 2018-03-29 PROCEDURE — 63600175 PHARM REV CODE 636 W HCPCS: Performed by: FAMILY MEDICINE

## 2018-03-29 PROCEDURE — 63600175 PHARM REV CODE 636 W HCPCS: Performed by: RADIOLOGY

## 2018-03-29 PROCEDURE — 25000003 PHARM REV CODE 250: Performed by: FAMILY MEDICINE

## 2018-03-29 PROCEDURE — 82962 GLUCOSE BLOOD TEST: CPT

## 2018-03-29 PROCEDURE — 78201 LIVER IMAGING STATIC ONLY: CPT | Mod: 26,,, | Performed by: RADIOLOGY

## 2018-03-29 PROCEDURE — 78201 LIVER IMAGING STATIC ONLY: CPT | Mod: TC

## 2018-03-29 RX ORDER — FENTANYL CITRATE 50 UG/ML
INJECTION, SOLUTION INTRAMUSCULAR; INTRAVENOUS CODE/TRAUMA/SEDATION MEDICATION
Status: COMPLETED | OUTPATIENT
Start: 2018-03-29 | End: 2018-03-29

## 2018-03-29 RX ORDER — ESOMEPRAZOLE MAGNESIUM 40 MG/1
40 CAPSULE, DELAYED RELEASE ORAL DAILY
Qty: 30 CAPSULE | Refills: 0 | Status: SHIPPED | OUTPATIENT
Start: 2018-03-29 | End: 2018-01-01

## 2018-03-29 RX ORDER — HEPARIN SODIUM 200 [USP'U]/100ML
500 INJECTION, SOLUTION INTRAVENOUS CONTINUOUS
Status: DISCONTINUED | OUTPATIENT
Start: 2018-03-29 | End: 2018-03-29 | Stop reason: HOSPADM

## 2018-03-29 RX ORDER — SODIUM CHLORIDE 9 MG/ML
INJECTION, SOLUTION INTRAVENOUS CONTINUOUS
Status: DISCONTINUED | OUTPATIENT
Start: 2018-03-29 | End: 2018-03-29 | Stop reason: HOSPADM

## 2018-03-29 RX ORDER — FENTANYL CITRATE 50 UG/ML
50 INJECTION, SOLUTION INTRAMUSCULAR; INTRAVENOUS
Status: DISCONTINUED | OUTPATIENT
Start: 2018-03-29 | End: 2018-03-29 | Stop reason: HOSPADM

## 2018-03-29 RX ORDER — METHYLPREDNISOLONE 4 MG/1
TABLET ORAL
Qty: 1 PACKAGE | Refills: 0 | Status: SHIPPED | OUTPATIENT
Start: 2018-03-29 | End: 2018-04-19

## 2018-03-29 RX ORDER — MIDAZOLAM HYDROCHLORIDE 1 MG/ML
INJECTION INTRAMUSCULAR; INTRAVENOUS CODE/TRAUMA/SEDATION MEDICATION
Status: COMPLETED | OUTPATIENT
Start: 2018-03-29 | End: 2018-03-29

## 2018-03-29 RX ORDER — DEXAMETHASONE SODIUM PHOSPHATE 100 MG/10ML
INJECTION INTRAMUSCULAR; INTRAVENOUS CODE/TRAUMA/SEDATION MEDICATION
Status: COMPLETED | OUTPATIENT
Start: 2018-03-29 | End: 2018-03-29

## 2018-03-29 RX ORDER — LIDOCAINE HYDROCHLORIDE 10 MG/ML
1 INJECTION, SOLUTION EPIDURAL; INFILTRATION; INTRACAUDAL; PERINEURAL ONCE AS NEEDED
Status: DISCONTINUED | OUTPATIENT
Start: 2018-03-29 | End: 2018-03-29 | Stop reason: HOSPADM

## 2018-03-29 RX ORDER — MIDAZOLAM HYDROCHLORIDE 1 MG/ML
1 INJECTION INTRAMUSCULAR; INTRAVENOUS
Status: DISCONTINUED | OUTPATIENT
Start: 2018-03-29 | End: 2018-03-29 | Stop reason: HOSPADM

## 2018-03-29 RX ADMIN — FENTANYL CITRATE 25 MCG: 50 INJECTION, SOLUTION INTRAMUSCULAR; INTRAVENOUS at 10:03

## 2018-03-29 RX ADMIN — SODIUM CHLORIDE 3 G: 9 INJECTION, SOLUTION INTRAVENOUS at 09:03

## 2018-03-29 RX ADMIN — FENTANYL CITRATE 50 MCG: 50 INJECTION, SOLUTION INTRAMUSCULAR; INTRAVENOUS at 09:03

## 2018-03-29 RX ADMIN — FENTANYL CITRATE 50 MCG: 50 INJECTION, SOLUTION INTRAMUSCULAR; INTRAVENOUS at 10:03

## 2018-03-29 RX ADMIN — MIDAZOLAM HYDROCHLORIDE 1 MG: 1 INJECTION, SOLUTION INTRAMUSCULAR; INTRAVENOUS at 10:03

## 2018-03-29 RX ADMIN — MIDAZOLAM HYDROCHLORIDE 0.5 MG: 1 INJECTION, SOLUTION INTRAMUSCULAR; INTRAVENOUS at 11:03

## 2018-03-29 RX ADMIN — DEXAMETHASONE SODIUM PHOSPHATE 10 MG: 10 INJECTION INTRAMUSCULAR; INTRAVENOUS at 10:03

## 2018-03-29 RX ADMIN — MIDAZOLAM HYDROCHLORIDE 1 MG: 1 INJECTION, SOLUTION INTRAMUSCULAR; INTRAVENOUS at 09:03

## 2018-03-29 RX ADMIN — FENTANYL CITRATE 25 MCG: 50 INJECTION, SOLUTION INTRAMUSCULAR; INTRAVENOUS at 11:03

## 2018-03-29 RX ADMIN — MIDAZOLAM HYDROCHLORIDE 0.5 MG: 1 INJECTION, SOLUTION INTRAMUSCULAR; INTRAVENOUS at 10:03

## 2018-03-29 NOTE — H&P
Radiology History & Physical      SUBJECTIVE:     Chief Complaint: hcc    History of Present Illness:  Hossein Sanchez Sr. is a 77 y.o. male who presents for y90 radioembolization.     Past Medical History:   Diagnosis Date    Anemia     Cancer     Diabetes mellitus     Encounter for blood transfusion     GI bleed 8/6/2003    Hypertension     Liver carcinoma     Sleep apnea 12/18/15    Stroke 2014    Stroke 7/16/14    TIA (transient ischemic attack) 2/11/15     Past Surgical History:   Procedure Laterality Date    APPENDECTOMY  teen    BONE MARROW BIOPSY      CATARACT EXTRACTION  12/10/11    CHOLECYSTECTOMY      COLONOSCOPY  12/21/15    JOINT REPLACEMENT Bilateral     knees    KNEE SURGERY Right replacement    KNEE SURGERY Left     TONSILLECTOMY         Home Meds:   Prior to Admission medications    Medication Sig Start Date End Date Taking? Authorizing Provider   apixaban (ELIQUIS) 5 mg Tab Take 5 mg by mouth 2 (two) times daily.    Historical Provider, MD   aspirin 81 MG Chew Take 81 mg by mouth once daily.    Historical Provider, MD   calcium carbonate (OS-CAMILA) 600 mg (1,500 mg) Tab Take 600 mg by mouth 2 (two) times daily with meals.    Historical Provider, MD   finasteride (PROSCAR) 5 mg tablet  5/13/16   Historical Provider, MD   furosemide (LASIX) 40 MG tablet Take 40 mg by mouth once daily.    Historical Provider, MD   iron polysaccharides (NIFEREX) 150 mg iron Cap Take 150 mg by mouth 2 (two) times daily.    Historical Provider, MD   metformin (GLUCOPHAGE) 500 MG tablet Take 1,000 mg by mouth 2 (two) times daily with meals.  5/10/16   Historical Provider, MD   MULTIVIT-IRON-MIN-FOLIC ACID 3,500-18-0.4 UNIT-MG-MG ORAL CHEW Take by mouth.    Historical Provider, MD   pantoprazole (PROTONIX) 40 MG tablet Take 40 mg by mouth once daily.    Historical Provider, MD   pravastatin (PRAVACHOL) 10 MG tablet Take 10 mg by mouth once daily.    Historical Provider, MD   tamsulosin (FLOMAX) 0.4 mg Cp24   5/13/16   Historical Provider, MD   TOPROL XL 25 mg 24 hr tablet  5/10/16   Historical Provider, MD   verapamil (VERELAN PM) 300 mg 24 hr capsule  5/10/16   Historical Provider, MD   vitamin D 1000 units Tab Take 2,000 Units by mouth once daily.    Historical Provider, MD     Anticoagulants/Antiplatelets: apixaban, asa    Allergies:   Review of patient's allergies indicates:   Allergen Reactions    Altace [ramipril] Anaphylaxis     Sedation History:  no adverse reactions    Review of Systems:   As documented in primary provider H&P.      OBJECTIVE:     Vital Signs (Most Recent)       Physical Exam:  ASA: 3  Mallampati: 1      Laboratory  Lab Results   Component Value Date    INR 1.1 03/29/2018       Lab Results   Component Value Date    WBC 7.88 03/29/2018    HGB 11.6 (L) 03/29/2018    HCT 37.4 (L) 03/29/2018    MCV 90 03/29/2018     03/29/2018      Lab Results   Component Value Date     (H) 03/29/2018     03/29/2018    K 5.2 (H) 03/29/2018     03/29/2018    CO2 26 03/29/2018    BUN 16 03/29/2018    CREATININE 1.1 03/29/2018    CALCIUM 9.9 03/29/2018    MG 1.6 03/28/2016    ALT 16 03/29/2018    AST 20 03/29/2018    ALBUMIN 3.7 03/29/2018    BILITOT 0.8 03/29/2018    BILIDIR 0.4 (H) 03/29/2018       ASSESSMENT/PLAN:     Sedation Plan: moderate  Patient will undergo right hepatic lobe radioembolization.    Eugenio Philippe MD  Department of Radiology  Pager: [phone]

## 2018-03-29 NOTE — DISCHARGE SUMMARY
Radiology Discharge Summary      Hospital Course: No complications    Admit Date: 3/29/2018  Discharge Date: 03/29/2018     Instructions Given to Patient: Yes  Diet: Resume prior diet  Activity: activity as tolerated and no driving for today    Description of Condition on Discharge: Stable  Vital Signs (Most Recent): Pulse: 75 (03/29/18 1105)  Resp: 20 (03/29/18 1105)  BP: (!) 158/85 (03/29/18 1105)  SpO2: 99 % (03/29/18 1105)    Discharge Disposition: Home    Discharge Diagnosis: Cholangiocarcinoma.F/U in IR in 1 month.     Charles Lacy M.D.  Diagnostic and Interventional Radiologist  Department of Radiology  Pager: [phone]

## 2018-03-29 NOTE — DISCHARGE INSTRUCTIONS
[facility] [phone] (MON-FRI 8 AM- 5PM). Radiology Resident on call [phone].

## 2018-03-29 NOTE — PROGRESS NOTES
y90 radioembolization procedure complete. Patient tolerated well, no acute signs of distress noted. VSS. Dressing clean, dry and intact. Patient ready for transfer to [facility].

## 2018-03-29 NOTE — PROGRESS NOTES
Recovery completed, pt tolerated well. No apparent distress noted. Dressing applied CDI. Discharge instructions reviewed and acknowledged. Pt discharged via wheelchair and private vehicle, accompanied by family.

## 2018-03-29 NOTE — PROCEDURES
Radiology Post-Procedure Note    Pre Op Diagnosis: Cholangiocarcinoma    Post Op Diagnosis: Same    Procedure: Y90 to RHA    Procedure performed by: Charles Lacy MD    Written Informed Consent Obtained: Yes  Specimen Removed: NO  Estimated Blood Loss: Minimal    Findings:   Via rt cfa, celiac and RHA angiograms were obtained. Y90 administered to rha in its entirety. No complications. Exoseal to rt cfa.    Patient tolerated procedure well.    Charles Lacy M.D.  Diagnostic and Interventional Radiologist  Department of Radiology  Pager: [phone]

## 2018-04-09 DIAGNOSIS — K76.9 LIVER LESION: Primary | ICD-10-CM

## 2018-04-09 DIAGNOSIS — C22.0 HEPATOCELLULAR CARCINOMA: ICD-10-CM

## 2018-04-30 NOTE — PROGRESS NOTES
Subjective:       Patient ID: Hossein Sanchez Sr. is a 78 y.o. male.    Chief Complaint: Cancer    Patient here for follow up of his hepatocellular carcinoma recently treated with radioembolization on 3/29/2018. He reports feeling well, except for complaints of fatigue. He tells me he was nauseated after the procedure, but that has resolved. He is accompanied by his wife and daughter. He had labs drawn this morning.       Review of Systems   Constitutional: Positive for activity change (due to fatuge), appetite change (decreased) and fatigue. Negative for chills and fever.   Respiratory: Negative for cough, shortness of breath, wheezing and stridor.    Cardiovascular: Negative for chest pain, palpitations and leg swelling.   Gastrointestinal: Negative for abdominal distention, abdominal pain, constipation, diarrhea, nausea and vomiting.       Objective:      Physical Exam   Constitutional: He is oriented to person, place, and time. He appears well-developed and well-nourished. No distress.   Cardiovascular: Normal rate, regular rhythm and normal heart sounds.  Exam reveals no gallop and no friction rub.    No murmur heard.  Pulmonary/Chest: Effort normal and breath sounds normal. No respiratory distress. He has no wheezes. He has no rales.   Abdominal: Soft. Bowel sounds are normal. He exhibits no distension and no mass. There is no tenderness. There is no guarding.   Neurological: He is alert and oriented to person, place, and time. Gait normal.   Skin: Skin is warm and dry. He is not diaphoretic.   Psychiatric: He has a normal mood and affect.   Vitals reviewed.      Assessment:       1. Hepatocellular carcinoma        Plan:         Discussed lab results with patient. Encouraged patient to supplement diet when he doesn't have an appetite with protein drinks such as ensure or boost. Follow up MRI and clinic visit scheduled for 6/29/2018. Patient and family verbalized understanding and agreement.

## 2018-05-04 NOTE — LETTER
May 4, 2018    Hossein Sanchez   [address]  [place] [address]      Dear Hossein Sanchez:    Your doctor has referred you to the [facility]. You will be contacted by our office and an initial appointment will then be scheduled for you.    We look forward to seeing you soon. If you have any further questions, please contact us at [phone].       Sincerely,        [facility]   [address] [place] [id number]  [phone]

## 2018-05-04 NOTE — NURSING
Pt records reviewed.   Pt will be referred to Hepatology.    Initial referral received  from the [facility]que.   Referring Provider/diagnosis  Emelina Robles NP  HCC/lung CA/S/p resection with gen surg.     Referral letter sent to provider and patient.

## 2018-05-22 PROBLEM — K74.60 HEPATIC CIRRHOSIS: Status: ACTIVE | Noted: 2018-01-01

## 2018-05-22 PROBLEM — C22.0 HCC (HEPATOCELLULAR CARCINOMA): Status: ACTIVE | Noted: 2018-01-01

## 2018-05-22 PROBLEM — I48.20 CHRONIC A-FIB: Status: ACTIVE | Noted: 2018-01-01

## 2018-05-22 NOTE — PROGRESS NOTES
Hepatology Consult Note    Referring provider: Emelina Souza    Chief complaint:   Chief Complaint   Patient presents with    Hepatocellular Carcinoma       HPI:  Hossein Sanchez Sr. is a pleasant 78 y.o. malewho was referred to Hepatology Clinic for consultation of had concerns including Hepatocellular Carcinoma..      Liver biopsy in 2017 - cirrhosis, stage 4 fibrosis. Likely DRISCOLL.  Patient has no active complaint today.    MELD-Na score: 8 at 3/29/2018  7:00 AM  MELD score: 8 at 3/29/2018  7:00 AM  Calculated from:  Serum Creatinine: 1.1 mg/dL at 3/29/2018  7:00 AM  Serum Sodium: 141 mmol/L (Rounded to 137) at 3/29/2018  7:00 AM  Total Bilirubin: 0.8 mg/dL (Rounded to 1) at 3/29/2018  7:00 AM  INR(ratio): 1.1 at 3/29/2018  7:00 AM  Age: 77 years    Liver disease:                   DRISCOLL - likely    MELD-Na:                         8  Child-Hall Class:             A    Transplant status:             not a candidate due to advanced age, concomitant malignancy (lung) and HCC out of [place]    Cirrhosis is compensated with:    Ascites:                                                      no  Spontaneous bacterial peritonitis:              no  Hepatic Encephalopathy:                           no  Portal bleeding:                                          no  Hepatocellular carcinoma:                          no    Hepatorenal syndrome:                              no  Hyponatremia:                                            no  Muscle wasting:                                          no   Portal vein thrombosis:                               no      Screening:  Last EGD: 10/2017 Dr. Dave ([facility]) - due in 2019    HCC history  Lab Results   Component Value Date    AFP 7.3 04/30/2018       3/29/18: TARE- Y-90 by Dr. Lacy    3/23/18: Oncology - Dr. Ahmadi - [Deja has a large right lobe HCC, and another right lobe lesion, with no obvious distant lesions on imaging. He has well compensated liver function. He  is scheduled for Y90 radio embolization with IR on 3/29. He also has a right upper lobe lung mass, believed to be adenocarcinoma in situ. He was scheduled for SBRT for this lesion, but has been deferred, as the lesion size has not changed. He will follow here after Y90 therapy.]    2/12/18: MRI: 7.0 x 6.5 cm (previously 6.2 x 5.0 cm) heterogeneously enhancing mass in hepatic segment VIII    2/16/18:  Right upper lobe lung mass (biopsy)  Atypical alveolar epithelial hyperplasia bordering on adenocarcinoma    OUTSIDE SLIDE REVIEW  1. ORIGINAL DATE OF PROCEDURE: 11/21/2017  LIVER MASS [OSR # [id number]] (NEEDLE BIOPSY):  Hepatocellular carcinoma, well differentiated with clear cell features  Background cirrhosis (stage 4 of 4)    2. ORIGINAL DATE OF PROCEDURE 12/5/2017  LUNG MASS, RIGHT UPPER LOBE [OSR# [id number]]:  At least atypical adenomatous hyperplasia, 1.5 millimeters    4/25/16: LIVER (EXCISION):  -No neoplasm identified  -Thick fibrous cyst wall with cyst contents, macrophages, and degenerative material    4/8/16: LIVER MASS (NEEDLE BIOPSY WITH PATHOLOGIST ADEQUACY):  -No neoplasm identified  -Acellular hyalinized scar and adjacent liver with hepatocyte dropout and inflammation    Patient Active Problem List   Diagnosis    Liver lesion    Hepatocellular carcinoma    Mass of upper lobe of right lung       Past Medical History:   Diagnosis Date    Anemia     Cancer     Diabetes mellitus     Encounter for blood transfusion     GI bleed 8/6/2003    Hypertension     Liver carcinoma     Sleep apnea 12/18/15    Stroke 2014    Stroke 7/16/14    TIA (transient ischemic attack) 2/11/15       Past Surgical History:   Procedure Laterality Date    APPENDECTOMY  teen    BONE MARROW BIOPSY      CATARACT EXTRACTION  12/10/11    CHOLECYSTECTOMY      COLONOSCOPY  12/21/15    JOINT REPLACEMENT Bilateral     knees    KNEE SURGERY Right replacement    KNEE SURGERY Left     TONSILLECTOMY         Family History    Problem Relation Age of Onset    Heart disease Mother     Diabetes Mother     Heart disease Father     Diabetes Father     No Known Problems Sister     Kidney disease Brother     No Known Problems Maternal Aunt     No Known Problems Maternal Uncle     No Known Problems Paternal Aunt     No Known Problems Paternal Uncle     No Known Problems Maternal Grandmother     No Known Problems Maternal Grandfather     Cancer Paternal Grandmother     No Known Problems Paternal Grandfather     Heart disease Brother     Esophageal cancer Brother     Lymphoma Brother     Stomach cancer Brother     Diabetes Daughter        Social History     Social History    Marital status:      Spouse name: N/A    Number of children: N/A    Years of education: N/A     Social History Main Topics    Smoking status: Never Smoker    Smokeless tobacco: Never Used    Alcohol use No    Drug use: No    Sexual activity: Not Currently     Partners: Female     Birth control/ protection: None     Other Topics Concern    None     Social History Narrative    None       Current Outpatient Prescriptions   Medication Sig Dispense Refill    apixaban (ELIQUIS) 5 mg Tab Take 5 mg by mouth 2 (two) times daily.      aspirin 81 MG Chew Take 81 mg by mouth once daily.      calcium carbonate (OS-CAMILA) 600 mg (1,500 mg) Tab Take 600 mg by mouth 2 (two) times daily with meals.      esomeprazole (NEXIUM) 40 MG capsule Take 1 capsule (40 mg total) by mouth once daily. 30 capsule 0    finasteride (PROSCAR) 5 mg tablet       furosemide (LASIX) 40 MG tablet Take 40 mg by mouth once daily.      iron polysaccharides (NIFEREX) 150 mg iron Cap Take 150 mg by mouth 2 (two) times daily.      metformin (GLUCOPHAGE) 500 MG tablet Take 1,000 mg by mouth 2 (two) times daily with meals.       MULTIVIT-IRON-MIN-FOLIC ACID 3,500-18-0.4 UNIT-MG-MG ORAL CHEW Take by mouth.      pantoprazole (PROTONIX) 40 MG tablet Take 40 mg by mouth once daily.   "    pravastatin (PRAVACHOL) 10 MG tablet Take 10 mg by mouth once daily.      tamsulosin (FLOMAX) 0.4 mg Cp24       TOPROL XL 25 mg 24 hr tablet       verapamil (VERELAN PM) 300 mg 24 hr capsule       vitamin D 1000 units Tab Take 2,000 Units by mouth once daily.       No current facility-administered medications for this visit.        Review of patient's allergies indicates:   Allergen Reactions    Altace [ramipril] Anaphylaxis       Review of Systems   Constitutional: Negative for chills, fever, malaise/fatigue and weight loss.   HENT: Negative for congestion, nosebleeds and sore throat.    Eyes: Negative for blurred vision, double vision and photophobia.   Respiratory: Negative for cough and shortness of breath.    Cardiovascular: Negative for chest pain, palpitations, orthopnea and leg swelling.   Gastrointestinal: Negative for abdominal pain, blood in stool, constipation, diarrhea, melena and vomiting.   Genitourinary: Negative for dysuria.   Musculoskeletal: Negative for falls and joint pain.   Skin: Negative for itching and rash.   Neurological: Negative for dizziness, tremors and weakness.   Endo/Heme/Allergies: Does not bruise/bleed easily.   Psychiatric/Behavioral: Negative for depression and substance abuse. The patient is not nervous/anxious and does not have insomnia.        Vitals:    05/22/18 1029   BP: 130/63   Pulse: 67   Resp: 18   Temp: 96.1 °F (35.6 °C)   TempSrc: Oral   SpO2: 97%   Weight: 112.6 kg (248 lb 3.8 oz)   Height: 5' 4" (1.626 m)       Physical Exam   Constitutional: He is oriented to person, place, and time. He appears well-developed and well-nourished. No distress.   HENT:   Head: Normocephalic and atraumatic.   Mouth/Throat: Oropharynx is clear and moist.   Eyes: Conjunctivae are normal. No scleral icterus.   Neck: Normal range of motion.   Cardiovascular: Normal rate, regular rhythm, normal heart sounds and intact distal pulses.    Pulmonary/Chest: Effort normal and breath " sounds normal. No respiratory distress. He has no wheezes. He has no rales.   Abdominal: Soft. Normal appearance and bowel sounds are normal. He exhibits no shifting dullness, no distension, no pulsatile liver, no fluid wave and no ascites. There is no splenomegaly or hepatomegaly. No hernia.   Musculoskeletal: He exhibits no edema.   Neurological: He is alert and oriented to person, place, and time. He is not disoriented.   Skin: Skin is warm and dry. No rash noted. He is not diaphoretic.   Psychiatric: He has a normal mood and affect. His behavior is normal. His mood appears not anxious. His affect is not inappropriate. He is not agitated. He is communicative. He is attentive.   Nursing note and vitals reviewed.      LABS: I personally reviewed pertinent laboratory findings.    Lab Results   Component Value Date    ALT 10 04/30/2018    AST 17 04/30/2018    ALKPHOS 112 04/30/2018    BILITOT 0.9 04/30/2018       Lab Results   Component Value Date    WBC 7.88 03/29/2018    HGB 11.6 (L) 03/29/2018    HCT 37.4 (L) 03/29/2018    MCV 90 03/29/2018     03/29/2018       Lab Results   Component Value Date     04/30/2018    K 4.9 04/30/2018     04/30/2018    CO2 26 04/30/2018    BUN 13 04/30/2018    CREATININE 1.0 04/30/2018    CALCIUM 9.6 04/30/2018    ANIONGAP 11 04/30/2018    ESTGFRAFRICA >60.0 04/30/2018    EGFRNONAA >60.0 04/30/2018       No results found for: HAV, HEPAIGM, HEPBIGM, HEPBCAB, HBEAG, HEPCAB    No results found for: SMOOTHMUSCAB, MITOAB    I personally reviewed all recent lab results.  I personally reviewed imaging studies.    Assessment:  78 y.o. male presenting with     1. HCC (hepatocellular carcinoma)    2. Hepatic cirrhosis, unspecified hepatic cirrhosis type, unspecified whether ascites present      HCC: 7 cm x 6.5 cm mass s/p TARE 3/2018, will review surveillance MRI and d/w IR if needs more locoregional therapy.   BCLC: B    Cirrhosis: likely DRISCOLL, well-compensated. CPT:  A    Recommendation(s):  - blood work today  - follow up with Interventional Radiology and MRI on 6/29/18   - return in 6 months    A total of 45 minutes were spent face-to-face with the patient during this encounter and over half of that time was spent on counseling and coordination of care.  We discussed in depth the nature of the patient's liver disease, the management plan in details. I also educated the patient about lifestyle modifications which may improve hepatic steatosis, overweight/obesity, insulin resistance and high blood pressure issues. I have provided the patient with an opportunity to ask questions and have all questions answered to his satisfaction.     I have sent communication to the referring physician and/or primary care provider.    Estelle Dumont MD  Staff Physician  Hepatology and Liver Transplant  [facility]  [facility]

## 2018-05-22 NOTE — LETTER
May 23, 2018      Emelina Souza, NP  [address]  [facility] [address]           [facility]  [address]  [facility] [address]  Phone: [phone]  Fax: [phone]          Patient: Hossein Sanchez Sr.   MR Number: [id number]   YOB: 1940   Date of Visit: 5/22/2018       Dear Emelina Souza:    Thank you for referring Hossein Sanchez to me for evaluation. Attached you will find relevant portions of my assessment and plan of care.    If you have questions, please do not hesitate to call me. I look forward to following Hossein Sanchez along with you.    Sincerely,    Estelle Dumont MD    Enclosure  CC:  No Recipients    If you would like to receive this communication electronically, please contact [email or web address] or [phone] to request more information on [facility] access.    For providers and/or their staff who would like to refer a patient to [facility], please contact us through our one-stop-shop provider referral line, [facility], at [phone].    If you feel you have received this communication in error or would no longer like to receive these types of communications, please e-mail [email or web address]

## 2018-05-29 NOTE — TELEPHONE ENCOUNTER
----- Message from Estelle Dumont MD sent at 5/28/2018 12:56 PM CDT -----  Please inform patient that his Hepatitis B and C tests are negative. Plan: MRI as scheduled on 6/29/18 and IR follow up. Thank you.

## 2018-07-02 NOTE — PROGRESS NOTES
Subjective:       Patient ID: Hossein Sanchez Sr. is a 78 y.o. male.    Chief Complaint: Hepatocellular Carcinoma    Patient here for follow up of hepatocellular carcinoma recently treated with radioembolization on 3/29/2018. He reports feeling well. He denies any abdominal pain or distention. He is accompanied by his wife. He had an MRI done this morning.      Review of Systems   Constitutional: Negative for activity change, appetite change, chills, fatigue and fever.   Respiratory: Negative for cough, shortness of breath, wheezing and stridor.    Cardiovascular: Negative for chest pain, palpitations and leg swelling.   Gastrointestinal: Negative for abdominal distention, abdominal pain, constipation, diarrhea, nausea and vomiting.       Objective:      Physical Exam   Constitutional: He is oriented to person, place, and time. He appears well-developed and well-nourished. No distress.   Cardiovascular: Normal rate, regular rhythm, normal heart sounds and intact distal pulses.  Exam reveals no gallop and no friction rub.    No murmur heard.  Pulmonary/Chest: Effort normal and breath sounds normal. No respiratory distress. He has no wheezes. He has no rales.   Abdominal: Soft. Bowel sounds are normal. He exhibits no distension and no mass. There is no tenderness. There is no guarding.   Neurological: He is alert and oriented to person, place, and time.   Skin: Skin is warm and dry. He is not diaphoretic.   Psychiatric: He has a normal mood and affect.   Vitals reviewed.      Assessment:       1. Hepatocellular carcinoma        Plan:         Reviewed MRI with Dr. Lacy. Explained to patient overall improvement, but there is some residual noted. Dr. Lacy recommends retreatment of the left side with radioembolization. Yttrium 90 Radioembolization discussed in detail with the patient including risks, benefits, potential complications, usual pre and post procedure course.  Discussed the need for initial Angiogram  mapping study prior to scheduling the actual Radioembolization procedure. Patient is familiar with procedure and verbalizes understanding and agreement. We will call to schedule once the physician's schedule is posted. Clinic phone number provided.

## 2018-07-18 NOTE — PROCEDURES
"Radiology Post-Procedure Note    Pre Op Diagnosis: Hepatocellular carcinoma    Post Op Diagnosis: Same    Procedure: Yttrium planning    Procedure performed by: Abimael Lacy MD, MD, Richard     Written Informed Consent Obtained: Yes    Specimen Removed: No    Estimated Blood Loss: Minimal    Findings: The right common femoral artery was accessed with a needle after sterile prep and administration of lidocaine. A 5 fr sheath was then placed. A motorjame was used to access the celiac artery and angiogram was performed. Angiograms were performed in the proper hepatic, left and right hepatic arteries. MAA was then delivered at the proper artery.    Patient tolerated procedure well.    Reggie Patel MD ([facility])  Radiology PGY-5  [phone]      "

## 2018-07-18 NOTE — H&P
Radiology History & Physical      SUBJECTIVE:     Chief Complaint: Hepatocellular carcinoma    History of Present Illness:  Hossein Sanchez Sr. is a 78 y.o. male who presents for Y90 mapping.    Past Medical History:   Diagnosis Date    Anemia     Cancer     Diabetes mellitus     Encounter for blood transfusion     GI bleed 8/6/2003    Hypertension     Liver carcinoma     Sleep apnea 12/18/15    Stroke 2014    Stroke 7/16/14    TIA (transient ischemic attack) 2/11/15     Past Surgical History:   Procedure Laterality Date    APPENDECTOMY  teen    BONE MARROW BIOPSY      CATARACT EXTRACTION  12/10/11    CHOLECYSTECTOMY      COLONOSCOPY  12/21/15    JOINT REPLACEMENT Bilateral     knees    KNEE SURGERY Right replacement    KNEE SURGERY Left     TONSILLECTOMY         Home Meds:   Prior to Admission medications    Medication Sig Start Date End Date Taking? Authorizing Provider   apixaban (ELIQUIS) 5 mg Tab Take 5 mg by mouth 2 (two) times daily.    Historical Provider, MD   aspirin 81 MG Chew Take 81 mg by mouth once daily.    Historical Provider, MD   calcium carbonate (OS-CAMILA) 600 mg (1,500 mg) Tab Take 600 mg by mouth 2 (two) times daily with meals.    Historical Provider, MD   finasteride (PROSCAR) 5 mg tablet  5/13/16   Historical Provider, MD   furosemide (LASIX) 40 MG tablet Take 40 mg by mouth once daily.    Historical Provider, MD   iron polysaccharides (NIFEREX) 150 mg iron Cap Take 150 mg by mouth 2 (two) times daily.    Historical Provider, MD   metformin (GLUCOPHAGE) 500 MG tablet Take 1,000 mg by mouth 2 (two) times daily with meals.  5/10/16   Historical Provider, MD   MULTIVIT-IRON-MIN-FOLIC ACID 3,500-18-0.4 UNIT-MG-MG ORAL CHEW Take by mouth.    Historical Provider, MD   pantoprazole (PROTONIX) 40 MG tablet Take 40 mg by mouth once daily.    Historical Provider, MD   pravastatin (PRAVACHOL) 10 MG tablet Take 10 mg by mouth once daily.    Historical Provider, MD   tamsulosin (FLOMAX)  "0.4 mg Cp24  5/13/16   Historical Provider, MD   TOPROL XL 25 mg 24 hr tablet Take 25 mg by mouth 2 (two) times daily.  5/10/16   Historical Provider, MD   verapamil (VERELAN PM) 300 mg 24 hr capsule  5/10/16   Historical Provider, MD   vitamin D 1000 units Tab Take 2,000 Units by mouth once daily.    Historical Provider, MD     Anticoagulants/Antiplatelets: no anticoagulation    Allergies:   Review of patient's allergies indicates:   Allergen Reactions    Altace [ramipril] Anaphylaxis    Adhesive tape-silicones Blisters     Sedation History:  no adverse reactions    Review of Systems:   Hematological: no known coagulopathies  Respiratory: no shortness of breath  Cardiovascular: no chest pain  Gastrointestinal: no abdominal pain  Genito-Urinary: no dysuria  Musculoskeletal: negative  Neurological: no TIA or stroke symptoms         OBJECTIVE:     Vital Signs (Most Recent)       Physical Exam:  ASA: 2  Mallampati: 2    General: no acute distress  Mental Status: alert and oriented to person, place and time  HEENT: normocephalic, atraumatic  Chest: unlabored breathing  Heart: regular heart rate  Abdomen: nondistended  Extremity: moves all extremities    Laboratory  Lab Results   Component Value Date    INR 1.2 07/18/2018       Lab Results   Component Value Date    WBC 8.27 07/18/2018    HGB 10.3 (L) 07/18/2018    HCT 34.0 (L) 07/18/2018    MCV 83 07/18/2018     (L) 07/18/2018      Lab Results   Component Value Date     (H) 04/30/2018     04/30/2018    K 4.9 04/30/2018     04/30/2018    CO2 26 04/30/2018    BUN 13 04/30/2018    CREATININE 1.0 04/30/2018    CALCIUM 9.6 04/30/2018    MG 1.6 03/28/2016    ALT 10 04/30/2018    AST 17 04/30/2018    ALBUMIN 3.2 (L) 04/30/2018    BILITOT 0.9 04/30/2018    BILIDIR 0.4 (H) 03/29/2018       ASSESSMENT/PLAN:     Sedation Plan: moderate  Patient will undergo Y90 mapping.    Reggie Patel MD ([facility])  Radiology PGY-5  [phone]      "

## 2018-07-18 NOTE — PROGRESS NOTES
Discharge instructions reviewed with pt & spouse, both verbalize understanding.  Instructions include medications, self/site care, and when to call a physician.  IV removed, DSD applied.  Pt awaiting transport to door.

## 2018-07-18 NOTE — DISCHARGE INSTRUCTIONS
Please call with any questions or concerns.      Monday thru Friday 8:00 am - 4:30 pm    Interventional Radiology   [phone]    After Hours    Ask for the Radiology Intern on call  [phone]

## 2018-07-18 NOTE — DISCHARGE SUMMARY
"Radiology Discharge Summary      Hospital Course: No complications    Admit Date: 7/18/2018  Discharge Date: 07/18/2018     Instructions Given to Patient: Yes  Diet: Resume prior diet  Activity: activity as tolerated    Description of Condition on Discharge: Stable  Vital Signs (Most Recent): Temp: 97.7 °F (36.5 °C) (07/18/18 1210)  Pulse: 70 (07/18/18 1300)  Resp: 16 (07/18/18 1300)  BP: (!) 128/90 (07/18/18 1300)  SpO2: 99 % (07/18/18 1300)    Discharge Disposition: Home    Discharge Diagnosis: HCC     Follow-up: plan on y90 treatment within the month    Reggie Patel MD ([facility])  Radiology PGY-5  [phone]      "

## 2018-08-08 NOTE — DISCHARGE SUMMARY
Radiology Discharge Summary      Hospital Course: No complications    Admit Date: 8/8/2018  Discharge Date: 08/08/2018     Instructions Given to Patient: Yes  Diet: Resume prior diet  Activity: activity as tolerated    Description of Condition on Discharge: Stable  Vital Signs (Most Recent): Temp: 98 °F (36.7 °C) (08/08/18 0827)  Pulse: 72 (08/08/18 1134)  Resp: 16 (08/08/18 1134)  BP: (!) 177/81 (08/08/18 1134)  SpO2: 98 % (08/08/18 1134)    Discharge Disposition: Home    Discharge Diagnosis: HCC, s/p y90 TARE     Follow-up: 1 month in IR clinic with repeat imaging    Eugenio Philippe MD  Department of Radiology  Pager: [phone]

## 2018-08-08 NOTE — PROGRESS NOTES
Discharge instructions reviewed with pt & spouse, both verbalize understanding.  Instructions include medications, self/site care, and when to call a physician.  IV removed, DSD applied.  Pt awaiting transport at this time.

## 2018-08-08 NOTE — H&P
Radiology History & Physical      SUBJECTIVE:     Chief Complaint: HCC    History of Present Illness:  Hossein Sanchez Sr. is a 78 y.o. male who presents for Y90 TARE of right hepatic lobe HCC. Has been treated before. Not transplant candidate. Tbili 1.2. MELD 8.     Past Medical History:   Diagnosis Date    Anemia     Cancer     Diabetes mellitus     Encounter for blood transfusion     GI bleed 8/6/2003    Hypertension     Liver carcinoma     Sleep apnea 12/18/15    Stroke 2014    Stroke 7/16/14    TIA (transient ischemic attack) 2/11/15       Past Surgical History:   Procedure Laterality Date    APPENDECTOMY  teen    BONE MARROW BIOPSY      CATARACT EXTRACTION  12/10/11    CHOLECYSTECTOMY      COLONOSCOPY  12/21/15    JOINT REPLACEMENT Bilateral     knees    KNEE SURGERY Right replacement    KNEE SURGERY Left     TONSILLECTOMY         Home Meds:   Prior to Admission medications    Medication Sig Start Date End Date Taking? Authorizing Provider   apixaban (ELIQUIS) 5 mg Tab Take 5 mg by mouth 2 (two) times daily.   Yes Historical Provider, MD   aspirin 81 MG Chew Take 81 mg by mouth once daily.   Yes Historical Provider, MD   calcium carbonate (OS-CAMILA) 600 mg (1,500 mg) Tab Take 600 mg by mouth 2 (two) times daily with meals.   Yes Historical Provider, MD   esomeprazole magnesium (NEXIUM) 10 mg GrPS Take 10 mg by mouth before breakfast. 7/18/18 7/18/19 Yes Reggie Patel MD   finasteride (PROSCAR) 5 mg tablet  5/13/16  Yes Historical Provider, MD   furosemide (LASIX) 40 MG tablet Take 40 mg by mouth once daily.   Yes Historical Provider, MD   iron polysaccharides (NIFEREX) 150 mg iron Cap Take 150 mg by mouth 2 (two) times daily.   Yes Historical Provider, MD   metformin (GLUCOPHAGE) 500 MG tablet Take 1,000 mg by mouth 2 (two) times daily with meals.  5/10/16  Yes Historical Provider, MD   MULTIVIT-IRON-MIN-FOLIC ACID 3,500-18-0.4 UNIT-MG-MG ORAL CHEW Take by mouth.   Yes Historical Provider, MD    pantoprazole (PROTONIX) 40 MG tablet Take 40 mg by mouth once daily.   Yes Historical Provider, MD   pravastatin (PRAVACHOL) 10 MG tablet Take 10 mg by mouth once daily.   Yes Historical Provider, MD   tamsulosin (FLOMAX) 0.4 mg Cp24  5/13/16  Yes Historical Provider, MD   TOPROL XL 25 mg 24 hr tablet Take 25 mg by mouth 2 (two) times daily.  5/10/16  Yes Historical Provider, MD   verapamil (VERELAN PM) 300 mg 24 hr capsule  5/10/16  Yes Historical Provider, MD   vitamin D 1000 units Tab Take 2,000 Units by mouth once daily.   Yes Historical Provider, MD       Anticoagulants/Antiplatelets: no anticoagulation    Allergies:   Review of patient's allergies indicates:   Allergen Reactions    Altace [ramipril] Anaphylaxis    Adhesive tape-silicones Blisters       Sedation History:  no adverse reactions    Review of Systems:   As documented in primary provider H&P.      OBJECTIVE:     Vital Signs (Most Recent)  Temp: 98 °F (36.7 °C) (08/08/18 0827)  Pulse: 68 (08/08/18 0827)  Resp: 16 (08/08/18 0827)  BP: (!) 149/77 (08/08/18 0828)  SpO2: 99 % (08/08/18 0827)    Physical Exam:  ASA: 3  Mallampati: 1      Laboratory  Lab Results   Component Value Date    INR 1.2 08/08/2018       Lab Results   Component Value Date    WBC 7.64 08/08/2018    HGB 10.2 (L) 08/08/2018    HCT 34.8 (L) 08/08/2018    MCV 82 08/08/2018     (L) 08/08/2018      Lab Results   Component Value Date     (H) 08/08/2018     08/08/2018    K 4.6 08/08/2018     08/08/2018    CO2 23 08/08/2018    BUN 20 08/08/2018    CREATININE 1.2 08/08/2018    CALCIUM 9.5 08/08/2018    MG 1.6 03/28/2016    ALT 14 08/08/2018    AST 21 08/08/2018    ALBUMIN 3.8 08/08/2018    BILITOT 1.2 (H) 08/08/2018    BILIDIR 0.7 (H) 08/08/2018       ASSESSMENT/PLAN:     Sedation Plan: moderate  Patient will undergo Y90 radioembolization of right hepatic lobe HCC    Eugenio Philippe MD  Department of Radiology  Pager: [phone]

## 2018-08-08 NOTE — PROCEDURES
Radiology Post-Procedure Note    Pre Op Diagnosis: HCC    Post Op Diagnosis: Same    Procedure: Yttrium treatment    Procedure performed by: Charles Lacy MD; Eugenio Philippe MD (res)    Written Informed Consent Obtained: Yes    Specimen Removed: No    Estimated Blood Loss: Minimal    Findings: Right CFA access obtained. Right hepatic lobe y90 radioembolization performed via the right hepatic artery. See imaging report for details. Access closed successfully with 5F exoseal device.    Patient tolerated procedure well.    Eugenio Philippe MD  Department of Radiology  Pager: [phone]

## 2018-08-08 NOTE — SEDATION DOCUMENTATION
Hemostasis achieved via right groin with use of exoseal closure device.  HOB remain flat until 1328

## 2018-08-08 NOTE — PROGRESS NOTES
Pt arrived to [facility], bay 4. Report received from Marianne SMITH RN. Dressing to right groin dry and intact. Family at bedside.

## 2018-08-08 NOTE — DISCHARGE INSTRUCTIONS
Please call with any questions or concerns.      Monday thru Friday 8:00 am - 4:30 pm    Interventional Radiology   [phone]    After Hours    Ask for the Radiology Intern on call  [phone]

## 2018-09-10 NOTE — LETTER
[facility] - Interventional Rad  [address]  [facility] [address]  Phone: [phone] PRE-PROCEDURE INSTRUCTIONS    Your procedure is scheduled for 9/25/2018. Please arrive by 8:30am.    You must check-in and receive a wristband before going to your procedure. Your check-in location is Laboratory then [facility] of [facility].    DO NOT take aspirin eliquis for 5 days before your procedure.    ONLY TAKE metoprolol the morning of your procedure with a sip of water.    **Do not eat or drink anything between midnight and the time of your procedure. This includes gum, mints, and dandy lemon drops.    **Do not smoke or drink alcoholic beverages 24 hours prior to your procedure.    **Bathe the night before AND the morning of your procedure with chlorohexidine wash such as Hibiclens. This helps to keep your skin as bacteria free as possible.    **If you wear contact lenses, dentures, hearing aids, or glasses, bring a container to put them in during the procedure and give them to a family member for safekeeping.    **If you have been diagnosed with sleep apnea please bring your CPAP machine.    **If your doctor has scheduled you for an overnight stay, bring a small overnight bag with any personal items that you may need.    **Make arrangements in advance for transportation home by a responsible adult. It is not safe to drive a vehicle during the 24 hours following the procedure.    **All [facility] facilities and properties are tobacco free. Smoking is NOT allowed.    PLEASE NOTE: The procedure schedule has many variables which affect the time of your procedure. Family members should be available if your surgery time changes.    If you have any questions about these instructions call Interventional Radiology at [phone] or [phone].

## 2018-09-10 NOTE — PROGRESS NOTES
Subjective:       Patient ID: Hossein Sanchez Sr. is a 78 y.o. male.    Chief Complaint: Hepatocellular Carcinoma    Patient here for follow up of hepatocellular carcinoma last treated with radioembolization on 8/8/2018. He reports he had nausea for the first couple weeks after treatment, but this has since resolved. He occasionally has complaints of abdominal distention, but it passes on its own. He denies any abdominal pain. He is accompanied by his wife and daughter.      Review of Systems   Constitutional: Negative for activity change, appetite change, chills, fatigue and fever.   Respiratory: Negative for cough, shortness of breath, wheezing and stridor.    Cardiovascular: Negative for chest pain, palpitations and leg swelling.   Gastrointestinal: Positive for abdominal distention (occasional). Negative for abdominal pain, constipation, diarrhea, nausea and vomiting.       Objective:      Physical Exam   Constitutional: He is oriented to person, place, and time. He appears well-developed and well-nourished. No distress.   Cardiovascular: Normal rate, regular rhythm and normal heart sounds. Exam reveals no gallop and no friction rub.   No murmur heard.  Pulmonary/Chest: Effort normal and breath sounds normal. No stridor. No respiratory distress. He has no wheezes. He has no rales.   Abdominal: Soft. Bowel sounds are normal. He exhibits no distension and no mass. There is no tenderness. There is no guarding.   Neurological: He is alert and oriented to person, place, and time.   Skin: Skin is warm and dry. He is not diaphoretic.   Psychiatric: He has a normal mood and affect.   Vitals reviewed.      Assessment:       1. Hepatocellular carcinoma        Plan:         Explained to patient during mapping, residual disease was noted on the right lobe. Right lobe was retreated on 8/8/2018. Recommendation of Dr. Lacy is to now treat the left lobe. Patient and family verbalize understanding and agreement. Patient is  scheduled for radioembolization of the left on 9/25/2018. Pre-procedure handout with clinic phone number provided.

## 2018-09-25 NOTE — H&P
Radiology History & Physical      SUBJECTIVE:     Chief Complaint: presents for yttrium embolization    History of Present Illness:  Hossein Sanchez Sr. is a 78 y.o. male who presents for yttrium embolization  Past Medical History:   Diagnosis Date    Anemia     Cancer     Diabetes mellitus     Encounter for blood transfusion     GI bleed 8/6/2003    Hypertension     Liver carcinoma     Obstructive sleep apnea on CPAP     Sleep apnea 12/18/15    Stroke 2014    Stroke 7/16/14    TIA (transient ischemic attack) 2/11/15     Past Surgical History:   Procedure Laterality Date    APPENDECTOMY  teen    [id number] N/A 4/8/2016    Performed by [facility] Diagnostic Provider at [facility] OR [address] [place] [address]    BIOPSY-LIVER-LAPAROSCOPIC N/A 4/25/2016    Performed by Narinder Chavez MD at [facility] OR [address] [place] [address]    BONE MARROW BIOPSY      CATARACT EXTRACTION  12/10/11    CHOLECYSTECTOMY      CHOLECYSTECTOMY-LAPAROSCOPIC  4/25/2016    Performed by Narinder Chavez MD at [facility] OR [address] [place] [address]    COLONOSCOPY  12/21/15    EMBOLIZATION N/A 8/8/2018    Procedure: EMBOLIZATION, BLOOD VESSEL;  Surgeon: [facility] Diagnostic Provider;  Location: [facility] OR [address] [place] [address];  Service: General;  Laterality: N/A;    EMBOLIZATION, BLOOD VESSEL N/A 8/8/2018    Performed by [facility] Diagnostic Provider at [facility] OR [address] [place] [address]    Embolization, Yttrium Therapy N/A 7/18/2018    Performed by [facility] Diagnostic Provider at [facility] OR [address] [place] [address]    Embolization, Yttrium Therapy N/A 3/29/2018    Performed by [facility] Diagnostic Provider at [facility] OR [address] [place] [address]    Embolization, Yttrium Therapy N/A 3/8/2018    Performed by [facility] Diagnostic Provider at [facility] OR [address] [place] [address]    JOINT REPLACEMENT Bilateral     knees    KNEE SURGERY Right replacement    KNEE SURGERY Left     TONSILLECTOMY         Home Meds:   Prior to Admission medications    Medication Sig Start Date End Date Taking? Authorizing Provider   calcium carbonate (OS-CAMILA) 600 mg (1,500 mg) Tab Take 600 mg by mouth 2 (two) times daily with  meals.   Yes Historical Provider, MD   esomeprazole magnesium (NEXIUM) 10 mg GrPS Take 10 mg by mouth before breakfast. 7/18/18 7/18/19 Yes Reggie Patel MD   finasteride (PROSCAR) 5 mg tablet  5/13/16  Yes Historical Provider, MD   furosemide (LASIX) 40 MG tablet Take 40 mg by mouth once daily.   Yes Historical Provider, MD   iron polysaccharides (NIFEREX) 150 mg iron Cap Take 150 mg by mouth 2 (two) times daily.   Yes Historical Provider, MD   metformin (GLUCOPHAGE) 500 MG tablet Take 1,000 mg by mouth 2 (two) times daily with meals.  5/10/16  Yes Historical Provider, MD   metoprolol succinate (TOPROL-XL) 12.5 MG 24 hr split tablet Take 25 mg by mouth 2 (two) times daily.   Yes Historical Provider, MD   MULTIVIT-IRON-MIN-FOLIC ACID 3,500-18-0.4 UNIT-MG-MG ORAL CHEW Take by mouth.   Yes Historical Provider, MD   pantoprazole (PROTONIX) 40 MG tablet Take 40 mg by mouth once daily.   Yes Historical Provider, MD   pravastatin (PRAVACHOL) 10 MG tablet Take 10 mg by mouth once daily.   Yes Historical Provider, MD   tamsulosin (FLOMAX) 0.4 mg Cp24  5/13/16  Yes Historical Provider, MD   verapamil (VERELAN PM) 300 mg 24 hr capsule  5/10/16  Yes Historical Provider, MD   vitamin D 1000 units Tab Take 2,000 Units by mouth once daily.   Yes Historical Provider, MD   apixaban (ELIQUIS) 5 mg Tab Take 5 mg by mouth 2 (two) times daily.    Historical Provider, MD   aspirin 81 MG Chew Take 81 mg by mouth once daily.    Historical Provider, MD   oxyCODONE (ROXICODONE) 5 MG immediate release tablet Take 1 tablet (5 mg total) by mouth every 6 (six) hours as needed for Pain. 8/8/18   Eugenio Philippe MD     Anticoagulants/Antiplatelets: no anticoagulation    Allergies:   Review of patient's allergies indicates:   Allergen Reactions    Altace [ramipril] Anaphylaxis    Adhesive tape-silicones Blisters     Sedation History:  have not been any systemic reactions    Review of Systems:   Hematological: negative  no known  coagulopathies  Respiratory: no shortness of breath  Cardiovascular: no chest pain  Gastrointestinal: no abdominal pain  Genito-Urinary: no dysuria  Musculoskeletal: negative  Neurological: no TIA or stroke symptoms         OBJECTIVE:     Vital Signs (Most Recent)  Temp: 98.3 °F (36.8 °C) (09/25/18 0919)  Pulse: 83 (09/25/18 1124)  Resp: 16 (09/25/18 1124)  BP: (!) 137/93 (09/25/18 1124)  SpO2: 98 % (09/25/18 1124)    Physical Exam:  ASA: 2  Mallampati: 2  General: no acute distress  Mental Status: alert and oriented to person, place and time  HEENT: normocephalic, atraumatic  Chest: unlabored breathing  Heart: regular heart rate  Abdomen: nondistended  Extremity: moves all extremities    Laboratory  Lab Results   Component Value Date    INR 1.2 09/25/2018       Lab Results   Component Value Date    WBC 6.75 09/25/2018    HGB 10.1 (L) 09/25/2018    HCT 34.9 (L) 09/25/2018    MCV 79 (L) 09/25/2018     (L) 09/25/2018      Lab Results   Component Value Date     (H) 09/25/2018     09/25/2018    K 4.5 09/25/2018     09/25/2018    CO2 23 09/25/2018    BUN 14 09/25/2018    CREATININE 0.9 09/25/2018    CALCIUM 9.2 09/25/2018    MG 1.6 03/28/2016    ALT 13 09/25/2018    AST 22 09/25/2018    ALBUMIN 3.3 (L) 09/25/2018    BILITOT 1.1 (H) 09/25/2018    BILIDIR 0.5 (H) 09/25/2018       ASSESSMENT/PLAN:     Sedation Plan: Moderate Sedation   Patient will undergo yttrium embolization.    Mt Ardon MD  Interventional Radiology PGY-2  [facility]

## 2018-09-25 NOTE — PROCEDURES
Radiology Post-Procedure Note    Pre Op Diagnosis: HCC    Post Op Diagnosis: Same    Procedure: Y90 to segment 2    Procedure performed by: Charles Lacy MD    Written Informed Consent Obtained: Yes  Specimen Removed: NO  Estimated Blood Loss: Minimal    Findings:   Via rt cfa, celiac, LHA, and segment 2 branches were selected. Y90 administered to left segment 2 branch. Hemostasis achieved with manual compression. No complications.    Patient tolerated procedure well.    Charles Lacy M.D.  Diagnostic and Interventional Radiologist  Department of Radiology  Pager: [phone]

## 2018-09-25 NOTE — DISCHARGE SUMMARY
Radiology Discharge Summary      Hospital Course: No complications    Admit Date: 9/25/2018  Discharge Date: 09/25/2018     Instructions Given to Patient: Yes  Diet: Resume prior diet  Activity: activity as tolerated and no driving for today    Description of Condition on Discharge: Stable  Vital Signs (Most Recent): Temp: 98.3 °F (36.8 °C) (09/25/18 0919)  Pulse: 82 (09/25/18 1205)  Resp: (!) 22 (09/25/18 1205)  BP: (!) 147/78 (09/25/18 1205)  SpO2: (!) 93 % (09/25/18 1205)    Discharge Disposition: Home    Discharge Diagnosis: HCC s/p Y90. Follow up as scheduled.    Charles Lacy M.D.  Diagnostic and Interventional Radiologist  Department of Radiology  Pager: [phone]

## 2018-09-25 NOTE — PROGRESS NOTES
Recovery completed, pt tolerated well. No apparent distress noted. Dressing CDI. Discharge instructions reviewed and acknowledged. Pt discharged via wheelchair and private vehicle.

## 2018-09-25 NOTE — DISCHARGE INSTRUCTIONS
[facility] [phone] (MON-FRI 8 AM- 5PM). Radiology Resident on call [phone].

## 2018-10-02 NOTE — TELEPHONE ENCOUNTER
----- Message from Estelle Dumont MD sent at 10/1/2018  4:15 PM CDT -----  Regarding: RE: Clinic f/u visit  Sure, I can see them at 1:00 pm.    ----- Message -----  From: Crys Schwartz RN  Sent: 10/1/2018   3:46 PM  To: Estelle Dumont MD  Subject: Clinic f/u visit                                 Dr. Dumont-    This pt is coming to [place] for IR f/u on 10/25 and would like to see you at the same time, as they travel 2 hours. You are on hospital service that week. Would you like to see him anyway? It would be on Thursday, 10/25 in the afternoon.     SCC

## 2018-10-02 NOTE — TELEPHONE ENCOUNTER
Pt's wife, Sona, contacted by phone and message from Dr. Dumont relayed. Appt scheduled 10/25 at 1:00.    SCC

## 2018-10-25 PROBLEM — E11.9 TYPE 2 DIABETES MELLITUS WITHOUT COMPLICATION, WITHOUT LONG-TERM CURRENT USE OF INSULIN: Status: ACTIVE | Noted: 2018-01-01

## 2018-10-25 NOTE — PATIENT INSTRUCTIONS
- continue liver cancer treatment per Interventional Radioloy  - your liver function tests are stable    Cirrhosis Counseling  - strict abstinence of alcohol use  - avoid non-steroidal anti-inflammatory drugs (NSAIDs) such as ibuprofen, Motrin, naprosyn, Alleve due to the risk of kidney damage  - can take acetaminophen (Tylenol), no more than 2000 mg per day  - low sodium (salt) 2 gram per day diet  - no need to restrict protein in diet  - high protein diet: 1.2-1.5 gram/kg (protein per body weight in kilogram) per day to prevent muscle mass loss  - avoid fasting  - resistance exercises for muscle strength  - avoid raw seafoods due to the risk of fatal Vibrio vulnificus infection  - Upper endoscopy 2019 to screen for varices in the stomach and foodpipe which can burst and cause fatal bleeding

## 2018-10-25 NOTE — LETTER
October 25, 2018        Aleksandr Velazquez MD  [address] [place] [address]             [facility] - Hepatology  [address] [facility] [place] [address]  Phone: [phone]  Fax: [phone]   Patient: Hossein Sanchez Sr.   MR Number: [id number]   YOB: 1940   Date of Visit: 10/25/2018       Dear Dr. Velazquez:    Thank you for referring Hossein Sanchez to me for evaluation. Attached you will find relevant portions of my assessment and plan of care.    If you have questions, please do not hesitate to call me. I look forward to following Hossein Sanchez along with you.    Sincerely,      Estelle Dumont MD            CC  No Recipients    Enclosure

## 2018-10-25 NOTE — PROGRESS NOTES
Subjective:       Patient ID: Hossein Sanchez Sr. is a 78 y.o. male.    Chief Complaint: Hepatocellular Carcinoma    Patient here for follow up of hepatocellular carcinoma recently treated with radioembolization on 2018. He reports feeling well. He denies any abdominal pain or distention. He does admit to some intermittent nausea, but tells me it passes on its own. He is accompanied by his wife and daughter. He had labs drawn this morning.       Review of Systems   Constitutional: Negative for activity change, appetite change, chills, fatigue and fever.   Respiratory: Negative for cough, shortness of breath, wheezing and stridor.    Cardiovascular: Negative for chest pain, palpitations and leg swelling.   Gastrointestinal: Positive for nausea. Negative for abdominal distention, abdominal pain, constipation, diarrhea and vomiting.       Objective:      Physical Exam   Constitutional: He is oriented to person, place, and time. He appears well-developed and well-nourished. No distress.   Cardiovascular: Normal rate, regular rhythm and normal heart sounds. Exam reveals no gallop and no friction rub.   No murmur heard.  Pulmonary/Chest: Effort normal and breath sounds normal. No stridor. No respiratory distress. He has no wheezes. He has no rales.   Abdominal: Soft. Bowel sounds are normal. He exhibits no distension and no mass. There is no tenderness. There is no guarding.   Neurological: He is alert and oriented to person, place, and time.   Skin: Skin is warm and dry. He is not diaphoretic.   Psychiatric: He has a normal mood and affect.   Vitals reviewed.        ECO   MELD:  8 (2018)  Child-Hall: Class A  (2018)  Transplant Status: not a candidate  Assessment:       1. Hepatocellular carcinoma        Plan:         Reviewed labs with patient. Explained recommendation is to obtain MRI 3 months post treatment. Patient and family verbalized understanding and agreement. Patient scheduled for 2018  for MRI and clinic visit.

## 2018-10-25 NOTE — PROGRESS NOTES
Cc; Hepatocellular carcinoma, followup      HPI:  is a 77yo WM with  HTN, HLD, Afib (on eliquis), CVA, and lung and liver cancer.  Abdominal pain in 2016 led to CT imaging and laparoscopic liver biopsy on 5/2016 with benign pathology. Surveillance CT scan by pulmonology for lung nodule noted increase in size of lung lesion (3.0x2.8cm) and also noted cirrhotic liver with a right liver lobe lesion ([id number]). 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   Patient was evaluated and felt not to be a surgical candidate for resection of lung or liver lesion. He has established with radiation oncology with plans to undergo SBRT of his lung lesion.  He established with IR 3/1/18 and underwent mapping for Y-90 on 3/8/18.   He underwent radioembolization on 3/29/2018. He had response to treatment, but still had residual enhancement of the liver mass on MRI done in June 2018.  He then had right hepatic lobe y90 radioembolization on 8/8/18 and again on 9/25/18 ( now to the left side, segment 2).  He is here for a follow up. He had CT chest w/o contrast on 10/2/18. It showed a right UL paramediastinal nodule, now 3.4 x 2.8cm, previously ( in February 2018), 2.9 x 2.5cm.        Review of Systems   Constitutional: Positive for malaise/fatigue. Negative for chills, fever and weight loss.   HENT: Negative for ear discharge, ear pain and nosebleeds.    Eyes: Negative for blurred vision, double vision, photophobia and pain.   Respiratory: Negative for cough, hemoptysis and sputum production.    Cardiovascular: Negative for chest pain, palpitations and orthopnea.   Gastrointestinal: Negative for heartburn, nausea and vomiting.   Genitourinary: Negative for dysuria, frequency, hematuria and urgency.   Musculoskeletal: Negative for myalgias and neck pain.   Skin: Negative for itching and rash.   Neurological: Negative for dizziness, tingling,  tremors, sensory change, speech change, weakness and headaches.   Endo/Heme/Allergies: Does not bruise/bleed easily.         Current Outpatient Medications   Medication Sig    apixaban (ELIQUIS) 5 mg Tab Take 5 mg by mouth 2 (two) times daily.    aspirin 81 MG Chew Take 81 mg by mouth once daily.    calcium carbonate (OS-CAMILA) 600 mg (1,500 mg) Tab Take 600 mg by mouth 2 (two) times daily with meals.    finasteride (PROSCAR) 5 mg tablet     furosemide (LASIX) 40 MG tablet Take 40 mg by mouth once daily.    iron polysaccharides (NIFEREX) 150 mg iron Cap Take 150 mg by mouth 2 (two) times daily.    metformin (GLUCOPHAGE) 500 MG tablet Take 1,000 mg by mouth 2 (two) times daily with meals.     metoprolol succinate (TOPROL-XL) 12.5 MG 24 hr split tablet Take 25 mg by mouth 2 (two) times daily.    MULTIVIT-IRON-MIN-FOLIC ACID 3,500-18-0.4 UNIT-MG-MG ORAL CHEW Take by mouth.    pantoprazole (PROTONIX) 40 MG tablet Take 40 mg by mouth once daily.    pravastatin (PRAVACHOL) 10 MG tablet Take 10 mg by mouth once daily.    tamsulosin (FLOMAX) 0.4 mg Cp24     verapamil (VERELAN PM) 300 mg 24 hr capsule     vitamin D 1000 units Tab Take 2,000 Units by mouth once daily.     No current facility-administered medications for this visit.            Vitals:    10/25/18 1448   BP: (!) 149/88   Pulse: 92   Resp: 18   Temp: 96.9 °F (36.1 °C)     PS: ECOG1    Physical Exam   Constitutional: He is oriented to person, place, and time. He appears well-developed. No distress.   HENT:   Head: Normocephalic.   Mouth/Throat: No oropharyngeal exudate.   Eyes: Pupils are equal, round, and reactive to light. No scleral icterus.   Neck: Normal range of motion.   Cardiovascular: Normal rate and regular rhythm.   Pulmonary/Chest: Effort normal and breath sounds normal. No respiratory distress. He has no wheezes.   Abdominal: Soft. Bowel sounds are normal. He exhibits no distension. There is no tenderness. There is no rebound.    Musculoskeletal: He exhibits no edema.   Lymphadenopathy:     He has no cervical adenopathy.   Neurological: He is alert and oriented to person, place, and time. No cranial nerve deficit.   Skin: Skin is warm.   Psychiatric: He has a normal mood and affect.       Component      Latest Ref Rng & Units 10/25/2018   Sodium      136 - 145 mmol/L 138   Potassium      3.5 - 5.1 mmol/L 4.4   Chloride      95 - 110 mmol/L 104   CO2      23 - 29 mmol/L 21 (L)   Glucose      70 - 110 mg/dL 105   BUN, Bld      8 - 23 mg/dL 12   Creatinine      0.5 - 1.4 mg/dL 0.9   Calcium      8.7 - 10.5 mg/dL 9.3   Total Protein      6.0 - 8.4 g/dL 6.3   Albumin      3.5 - 5.2 g/dL 3.0 (L)   Total Bilirubin      0.1 - 1.0 mg/dL 1.0   Alkaline Phosphatase      55 - 135 U/L 172 (H)   AST      10 - 40 U/L 29   ALT      10 - 44 U/L 17   Anion Gap      8 - 16 mmol/L 13   eGFR if African American      >60 mL/min/1.73 m:2 >60.0   eGFR if non African American      >60 mL/min/1.73 m:2 >60.0   AFP      0.0 - 8.4 ng/mL 16 (H)       6/29/18 MRI abdomen    COMPARISON:  MRI abdomen with/without contrast 02/12/2018    FINDINGS:  Inferior Thorax: Normal.    Liver: The liver is at the upper limits of normal in size with the right hepatic lobe spanning approximately 17.5 cm.  A large, heterogeneous mass centered in hepatic segment V and VI is reduced in size now measuring approximately 9.2 x 5.4 cm consistent with post treatment change.  There is interval reduction in the degree of postcontrast enhancement with now only minimal nodular enhancement along the inferior margin and no appreciable washout on delayed phase imaging (axial series 10, image 53).  Additionally, there has been reduction in size of a mass lesion involving hepatic segment VIII now measuring approximately 5.9 x 6.3 cm.  Degree of enhancing components is reduced, however there is persistent nodular enhancement along the lateral margin of the lesion which demonstrates washout on delayed  phase imaging.  Largest nodular component measures 2.0 cm, best appreciated on axial series 10, image 20.  Small focus of enhancement without delayed phase washout is again present within the anterior margin of hepatic segment II, indeterminate in appearance.  Scattered nonenhancing lesions are again present throughout the right and left hepatic lobes consistent with post treatment change.    Gallbladder: Surgically absent.    Bile Ducts: No dilatation.    Pancreas: Pancreas demonstrates involutional change.  No pancreatic mass lesion or peripancreatic inflammation.    Spleen: Enlarged measuring up to 13.5 cm in long axis.  Multiple splenic collaterals are present.    Adrenals: Normal.    Kidneys/Ureters: Kidneys are normal in size and location.  Normal post-contrast enhancement.  There is a small T2 hyperintense lesion along the lower pole of the left kidney most consistent with simple renal cyst.    GI Tract/Mesentery: No evidence of bowel obstruction or inflammation.    Peritoneal Space: There is a trace volume of abdominal ascites overlying the right hepatic lobe.    Retroperitoneum: No significant lymphadenopathy.    Abdominal wall: Normal.    Vasculature: No aneurysm.    Bones: No significant abnormality.      Impression       Interval reduction in size and degree of enhancement of 2 heterogeneous right hepatic lobe mass lesions consistent with post treatment change.  Persistent nodular enhancement along the lateral margin of the hepatic segment VIII tumor and inferior margin of the hepatic segment V/VI tumor suggests potential residual tumor as detailed above.    Stable subcentimeter focus of enhancement within hepatic segment II remains indeterminate, however is concerning for additional tumor.    Splenomegaly.    Left simple renal cyst.    Trace volume abdominal ascites     2/16/18 FINAL PATHOLOGIC DIAGNOSIS  OUTSIDE SLIDE REVIEW  1. ORIGINAL DATE OF PROCEDURE: 11/21/2017  LIVER MASS [OSR # [id number]]  (NEEDLE BIOPSY):  Hepatocellular carcinoma, well differentiated with clear cell features  Background cirrhosis (stage 4 of 4)    2. ORIGINAL DATE OF PROCEDURE 12/5/2017  LUNG MASS, RIGHT UPPER LOBE [OSR# [id number]]:  At least atypical adenomatous hyperplasia, 1.5 millimeters  See comment  COMMENT: Sections show an atypical mucinous proliferation with lepidic pattern (no definite invasion). The differentiation between atypical adenomatous hyperplasia and adenocarcinoma in situ is based on size of the  lesion; clinical correlation required. Dr. GABRIEL Lieberman has reviewed this case and concurs with the interpretation. The findings in the lung are not histologically similar to the findings in the liver.      Assessment:    1. Hepatocellular carcinoma  2. 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 Obesity  4. Atrial fibrillation  5. Hypertension   6. Diabetes mellitus type 2    Plan:    1. He has bene treated with Y90 radio embolization three times so far, most recently in September 2018. He is awaiting repeat imaging. He feels well. His liver function is within normal mlimits in October 2018. No pain.    2. He has not been treated so far. His most recent CT done on 10/2/18 showed increase in the size of the previously known lesion. The right UL paramediastinal nodule, now 3.4 x 2.8cm, previously ( in February 2018), 2.9 x 2.5cm. The case will be discussed at the  thoracic conference. He will have MRI brain. PET CT( although the lung lesion was not PET avid previously).   He has not been evaluated by radiation oncology here and will be referred here. He is asymptomatic, although he does have occasional dyspnea.   3. Exercise and calorie reduction suggested  4. On Eliquis. Rate controlled  5. BP well controlled on Toprol XL  6. Blood glucose well controlled on Metformin. Not on Insulin    Distress Screening Results: Psychosocial Distress screening score of Distress Score: 0 noted and reviewed. No intervention indicated.

## 2018-10-25 NOTE — PROGRESS NOTES
Hepatology Note    Chief complaint:   Chief Complaint   Patient presents with    Follow-up       HPI:  Hossein Sanchez Sr. is a pleasant 78 y.o. malewho was referred to Hepatology Clinic for consultation of had concerns including Follow-up..      10/25/18: intermittent nausea, denies confusion, GI bleed, edema/ascies. Feeling relatively well. Was told lung cancer is coming back, will see Oncology. MELD: 9, compensated from liver standpoint.    HCC  9/25/18: left lobe TARE - y-90  3/8/18: right lobe retreatment TARE - y-90 8/8/18: right lobeTARE - y-90    Lab Results   Component Value Date    AFP 16 (H) 10/25/2018       Liver biopsy in 2017 - cirrhosis, stage 4 fibrosis. Likely DRISCOLL.  Patient has no active complaint today.    MELD-Na score: 9 at 9/25/2018  8:06 AM  MELD score: 9 at 9/25/2018  8:06 AM  Calculated from:  Serum Creatinine: 0.9 mg/dL (Rounded to 1 mg/dL) at 9/25/2018  8:06 AM  Serum Sodium: 137 mmol/L at 9/25/2018  8:06 AM  Total Bilirubin: 1.1 mg/dL at 9/25/2018  8:06 AM  INR(ratio): 1.2 at 9/25/2018  8:06 AM  Age: 78 years    Liver disease:                   DRISCOLL     MELD-Na:                         9  Child-Hall Class:             A    Transplant status:             not a candidate due to , concomitant malignancy (lung) and HCC out of [place]    Cirrhosis is compensated with:    Ascites:                                                      no  Spontaneous bacterial peritonitis:              no  Hepatic Encephalopathy:                           no  Portal bleeding:                                          no  Hepatocellular carcinoma:                          no    Hepatorenal syndrome:                              no  Hyponatremia:                                            no  Muscle wasting:                                          no   Portal vein thrombosis:                               no      Screening:  Last EGD: 10/2017 Dr. Dave ([facility]) - due in 2019    HCC history  Lab Results    Component Value Date    AFP 16 (H) 10/25/2018       3/23/18: Oncology - Dr. Ahmadi - [Deja has a large right lobe HCC, and another right lobe lesion, with no obvious distant lesions on imaging. He has well compensated liver function. He is scheduled for Y90 radio embolization with IR on 3/29. He also has a right upper lobe lung mass, believed to be adenocarcinoma in situ. He was scheduled for SBRT for this lesion, but has been deferred, as the lesion size has not changed. He will follow here after Y90 therapy.]    2/12/18: MRI: 7.0 x 6.5 cm (previously 6.2 x 5.0 cm) heterogeneously enhancing mass in hepatic segment VIII    2/16/18:  Right upper lobe lung mass (biopsy)  Atypical alveolar epithelial hyperplasia bordering on adenocarcinoma    OUTSIDE SLIDE REVIEW  1. ORIGINAL DATE OF PROCEDURE: 11/21/2017  LIVER MASS [OSR # [id number]] (NEEDLE BIOPSY):  Hepatocellular carcinoma, well differentiated with clear cell features  Background cirrhosis (stage 4 of 4)    2. ORIGINAL DATE OF PROCEDURE 12/5/2017  LUNG MASS, RIGHT UPPER LOBE [OSR# [id number]]:  At least atypical adenomatous hyperplasia, 1.5 millimeters    4/25/16: LIVER (EXCISION):  -No neoplasm identified  -Thick fibrous cyst wall with cyst contents, macrophages, and degenerative material    4/8/16: LIVER MASS (NEEDLE BIOPSY WITH PATHOLOGIST ADEQUACY):  -No neoplasm identified  -Acellular hyalinized scar and adjacent liver with hepatocyte dropout and inflammation    Patient Active Problem List   Diagnosis    Liver lesion    Hepatocellular carcinoma    Mass of upper lobe of right lung    Hepatic cirrhosis    HCC (hepatocellular carcinoma)    Chronic a-fib       Past Medical History:   Diagnosis Date    Anemia     Cancer     Diabetes mellitus     Encounter for blood transfusion     GI bleed 8/6/2003    Hypertension     Liver carcinoma     Obstructive sleep apnea on CPAP     Sleep apnea 12/18/15    Stroke 2014    Stroke 7/16/14    TIA (transient  ischemic attack) 2/11/15       Past Surgical History:   Procedure Laterality Date    APPENDECTOMY  teen    [id number] N/A 4/8/2016    Performed by [facility] at [facility] OR [address] [place] [address]    BIOPSY-LIVER-LAPAROSCOPIC N/A 4/25/2016    Performed by Narinder Chavez MD at [facility] OR [address] [place] [address]    BONE MARROW BIOPSY      CATARACT EXTRACTION  12/10/11    CHOLECYSTECTOMY      CHOLECYSTECTOMY-LAPAROSCOPIC  4/25/2016    Performed by Narinder Chavez MD at [facility] OR [address] [place] [address]    COLONOSCOPY  12/21/15    EMBOLIZATION N/A 8/8/2018    Procedure: EMBOLIZATION, BLOOD VESSEL;  Surgeon: [facility];  Location: [facility] OR [address] [place] [address];  Service: General;  Laterality: N/A;    EMBOLIZATION, ARTERY, LIVER, FOR SELECTIVE INTERNAL RADIATION THERAPY USING YTTRIUM-90 MICROSPHERES N/A 9/25/2018    [facility]    EMBOLIZATION, BLOOD VESSEL N/A 8/8/2018    [facility]    Embolization, Yttrium Therapy N/A 7/18/2018    [facility]    Embolization, Yttrium Therapy N/A 3/29/2018    [facility]    Embolization, Yttrium Therapy N/A 3/8/2018    [facility]    JOINT REPLACEMENT Bilateral     knees    KNEE SURGERY Right replacement    KNEE SURGERY Left     TONSILLECTOMY         Family History   Problem Relation Age of Onset    Heart disease Mother     Diabetes Mother     Heart disease Father     Diabetes Father     No Known Problems Sister     Kidney disease Brother     No Known Problems Maternal Aunt     No Known Problems Maternal Uncle     No Known Problems Paternal Aunt     No Known Problems Paternal Uncle     No Known Problems Maternal Grandmother     No Known Problems Maternal Grandfather     Cancer Paternal Grandmother     No Known Problems Paternal Grandfather     Heart disease  Brother     Esophageal cancer Brother     Lymphoma Brother     Stomach cancer Brother     Diabetes Daughter        Social History     Socioeconomic History    Marital status:      Spouse name: Not on file    Number of children: Not on file    Years of education: Not on file    Highest education level: Not on file   Social Needs    Financial resource strain: Not on file    Food insecurity - worry: Not on file    Food insecurity - inability: Not on file    Transportation needs - medical: Not on file    Transportation needs - non-medical: Not on file   Occupational History    Not on file   Tobacco Use    Smoking status: Never Smoker    Smokeless tobacco: Never Used   Substance and Sexual Activity    Alcohol use: No    Drug use: No    Sexual activity: Not Currently     Partners: Female     Birth control/protection: None   Other Topics Concern    Not on file   Social History Narrative    Not on file       Current Outpatient Medications   Medication Sig Dispense Refill    apixaban (ELIQUIS) 5 mg Tab Take 5 mg by mouth 2 (two) times daily.      aspirin 81 MG Chew Take 81 mg by mouth once daily.      calcium carbonate (OS-CAMILA) 600 mg (1,500 mg) Tab Take 600 mg by mouth 2 (two) times daily with meals.      esomeprazole magnesium (NEXIUM) 10 mg GrPS Take 10 mg by mouth before breakfast. 300 mg 5    finasteride (PROSCAR) 5 mg tablet       furosemide (LASIX) 40 MG tablet Take 40 mg by mouth once daily.      iron polysaccharides (NIFEREX) 150 mg iron Cap Take 150 mg by mouth 2 (two) times daily.      metformin (GLUCOPHAGE) 500 MG tablet Take 1,000 mg by mouth 2 (two) times daily with meals.       metoprolol succinate (TOPROL-XL) 12.5 MG 24 hr split tablet Take 25 mg by mouth 2 (two) times daily.      MULTIVIT-IRON-MIN-FOLIC ACID 3,500-18-0.4 UNIT-MG-MG ORAL CHEW Take by mouth.      oxyCODONE (ROXICODONE) 5 MG immediate release tablet Take 1 tablet (5 mg total) by mouth every 6 (six) hours  "as needed for Pain. 10 tablet 0    pantoprazole (PROTONIX) 40 MG tablet Take 40 mg by mouth once daily.      pravastatin (PRAVACHOL) 10 MG tablet Take 10 mg by mouth once daily.      tamsulosin (FLOMAX) 0.4 mg Cp24       verapamil (VERELAN PM) 300 mg 24 hr capsule       vitamin D 1000 units Tab Take 2,000 Units by mouth once daily.       No current facility-administered medications for this visit.        Review of patient's allergies indicates:   Allergen Reactions    Altace [ramipril] Anaphylaxis       Review of Systems   Constitutional: Negative for chills, fever, malaise/fatigue and weight loss.   HENT: Negative for congestion, nosebleeds and sore throat.    Eyes: Negative for blurred vision, double vision and photophobia.   Respiratory: Negative for cough and shortness of breath.    Cardiovascular: Negative for chest pain, palpitations, orthopnea and leg swelling.   Gastrointestinal: Negative for abdominal pain, blood in stool, constipation, diarrhea, melena and vomiting.   Genitourinary: Negative for dysuria.   Musculoskeletal: Negative for falls and joint pain.   Skin: Negative for itching and rash.   Neurological: Negative for dizziness, tremors and weakness.   Endo/Heme/Allergies: Does not bruise/bleed easily.   Psychiatric/Behavioral: Negative for depression and substance abuse. The patient is not nervous/anxious and does not have insomnia.        Vitals:    10/25/18 1251   BP: (!) 189/85   Pulse: 99   Resp: 18   Temp: 96.9 °F (36.1 °C)   TempSrc: Oral   SpO2: 98%   Weight: 114.9 kg (253 lb 4.9 oz)   Height: 5' 4" (1.626 m)       Physical Exam   Constitutional: He is oriented to person, place, and time. He appears well-developed and well-nourished. No distress.   HENT:   Head: Normocephalic and atraumatic.   Mouth/Throat: Oropharynx is clear and moist.   Eyes: Conjunctivae are normal. No scleral icterus.   Neck: Normal range of motion.   Cardiovascular: Normal rate, regular rhythm, normal heart sounds " and intact distal pulses.   Pulmonary/Chest: Effort normal and breath sounds normal. No respiratory distress. He has no wheezes. He has no rales.   Abdominal: Soft. Normal appearance and bowel sounds are normal. He exhibits no shifting dullness, no distension, no pulsatile liver, no fluid wave and no ascites. There is no splenomegaly or hepatomegaly. No hernia.   Musculoskeletal: He exhibits no edema.   Neurological: He is alert and oriented to person, place, and time. He is not disoriented.   Skin: Skin is warm and dry. No rash noted. He is not diaphoretic.   Psychiatric: He has a normal mood and affect. His behavior is normal. His mood appears not anxious. His affect is not inappropriate. He is not agitated. He is communicative. He is attentive.   Nursing note and vitals reviewed.      LABS: I personally reviewed pertinent laboratory findings.    Lab Results   Component Value Date    ALT 17 10/25/2018    AST 29 10/25/2018    ALKPHOS 172 (H) 10/25/2018    BILITOT 1.0 10/25/2018       Lab Results   Component Value Date    WBC 6.75 09/25/2018    HGB 10.1 (L) 09/25/2018    HCT 34.9 (L) 09/25/2018    MCV 79 (L) 09/25/2018     (L) 09/25/2018       Lab Results   Component Value Date     10/25/2018    K 4.4 10/25/2018     10/25/2018    CO2 21 (L) 10/25/2018    BUN 12 10/25/2018    CREATININE 0.9 10/25/2018    CALCIUM 9.3 10/25/2018    ANIONGAP 13 10/25/2018    ESTGFRAFRICA >60.0 10/25/2018    EGFRNONAA >60.0 10/25/2018       Lab Results   Component Value Date    HEPCAB Negative 05/22/2018       No results found for: SMOOTHMUSCAB, MITOAB    I personally reviewed all recent lab results.  I personally reviewed imaging studies.    Assessment:  78 y.o. male presenting with     1. Hepatocellular carcinoma    2. Hepatic cirrhosis, unspecified hepatic cirrhosis type, unspecified whether ascites present      HCC: 7 cm x 6.5 cm mass s/p TARE 3/2018, will review surveillance MRI and d/w IR if needs more  locoregional therapy.   BCLC: B  Now s/p treatment with additiona TARE to right lobe again and left lobe in Sep 18.      Cirrhosis: likely DRISCOLL, well-compensated. CPT: A    Recommendation(s):  - follow up with IR and Oncology  - cirrhosis counseling  - RTC 6 months w/ labs    A total of 45 minutes were spent face-to-face with the patient during this encounter and over half of that time was spent on counseling and coordination of care.  We discussed in depth the nature of the patient's liver disease, the management plan in details. I also educated the patient about lifestyle modifications which may improve hepatic steatosis, overweight/obesity, insulin resistance and high blood pressure issues. I have provided the patient with an opportunity to ask questions and have all questions answered to his satisfaction.     I have sent communication to the referring physician and/or primary care provider.    Estelle Dumont MD  Staff Physician  Hepatology and Liver Transplant  [facility]  [facility]

## 2018-10-25 NOTE — Clinical Note
--mri brain with contrast--in 1-2 wks---pet ct -in 1-2 wks--radaition oncology referal-asap--f/u in 6 wks

## 2018-11-06 PROBLEM — C34.11 PRIMARY ADENOCARCINOMA OF UPPER LOBE OF RIGHT LUNG: Status: ACTIVE | Noted: 2018-03-23

## 2018-11-06 NOTE — PROGRESS NOTES
PATIENT: Hossein Sanchez Sr.  MRN: [id number]  DATE: 11/6/2018     I have seen and examined the patient and agree with the findings and plan of Dr Sinclair.    Jonathan Morales      Diagnosis:   1. Primary adenocarcinoma of upper lobe of right lung        Chief Complaint: hepatacellular ca lung ca (radiation cons)      Oncologic History:   Oncologic History    Oncologic History Abdominal pain in 2016 led to CT imaging and laparoscopic liver biopsy on 5/2016 with benign pathology. Surveillance CT scan by pulmonology for lung nodule noted increase in size of lung lesion (3.0x2.8cm) and also noted cirrhotic liver with a right liver lobe lesion (82l64a1.1cm). 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 Patient evaluated and felt not to be a surgical candidate for resection of lung or liver lesion. He was referred to [facility] for chemo-embolization for his liver and referred to radiation oncology for SBRT of his lung.    78 year old man with DRISCOLL cirrhosis, HCC s/p radioembolization x3 also diagnosed with right upper lobe adenocarcinoma, [id number], Stage IB     Oncologic Treatment HCC  Underwent mapping for Y-90 on 3/8/18.   Radioembolization on 3/29/2018, 8/8/18 and 9/25/18    Right lung adenocarcinoma  SBRT pending     Pathology See initial consult note by Dr. Mt Gomez 2/9/18  Pathology specimen from 12/4/17         Subjective:    Interval History: Mr. Sanchez returns for new patient radiation oncology consultation.    78 year old man with DRISCOLL cirrhosis, HCC s/p radioembolization x3 also diagnosed with right upper lobe adenocarcinoma, [id number], Stage IB    Non smoker, denies hemoptysis, cough, fevers, chills, or unintentional weight loss.  Appetite has been fluctuating.  Says his taste hasn't been the same since the 2nd radioembolization.  Independent in activities of daily living.  No chest pain or dyspnea on exertion.    Past Medical History:    Past Medical History:   Diagnosis Date    Anemia     Cancer     Diabetes mellitus     Encounter for blood transfusion     GI bleed 8/6/2003    Hypertension     Liver carcinoma     Obstructive sleep apnea on CPAP     Sleep apnea 12/18/15    Stroke 2014    Stroke 7/16/14    TIA (transient ischemic attack) 2/11/15       Past Surgical HIstory:   Past Surgical History:   Procedure Laterality Date    APPENDECTOMY  teen    [id number] N/A 4/8/2016    Performed by [facility] at [facility] OR [address] [place] [address]    BIOPSY-LIVER-LAPAROSCOPIC N/A 4/25/2016    Performed by Narinder Chavez MD at [facility] OR [address] [place] [address]    BONE MARROW BIOPSY      CATARACT EXTRACTION  12/10/11    CHOLECYSTECTOMY      CHOLECYSTECTOMY-LAPAROSCOPIC  4/25/2016    Performed by Narinder Chavez MD at [facility] OR [address] [place] [address]    COLONOSCOPY  12/21/15    EMBOLIZATION N/A 8/8/2018    Procedure: EMBOLIZATION, BLOOD VESSEL;  Surgeon: [facility];  Location: [facility] OR [address] [place] [address];  Service: General;  Laterality: N/A;    EMBOLIZATION, ARTERY, LIVER, FOR SELECTIVE INTERNAL RADIATION THERAPY USING YTTRIUM-90 MICROSPHERES N/A 9/25/2018    Per[facility]    EMBOLIZATION, BLOOD VESSEL N/A 8/8/2018    Performed by [facility]    Embolization, Yttrium Therapy N/A 7/18/2018    Performed by [facility]    Embolization, Yttrium Therapy N/A 3/29/2018    Per[facility] by [facility]    Embolization, Yttrium Therapy N/A 3/8/2018    Per[facility] by [facility]    JOINT REPLACEMENT Bilateral     knees    KNEE SURGERY Right replacement    KNEE SURGERY Left     TONSILLECTOMY         Family History:   Family History   Problem Relation Age of Onset    Heart disease Mother     Diabetes Mother     Heart disease Father     Diabetes Father     No Known Problems Sister     Kidney disease  Brother     No Known Problems Maternal Aunt     No Known Problems Maternal Uncle     No Known Problems Paternal Aunt     No Known Problems Paternal Uncle     No Known Problems Maternal Grandmother     No Known Problems Maternal Grandfather     Cancer Paternal Grandmother     No Known Problems Paternal Grandfather     Heart disease Brother     Esophageal cancer Brother     Lymphoma Brother     Stomach cancer Brother     Diabetes Daughter        Social History:  reports that  has never smoked. he has never used smokeless tobacco. He reports that he does not drink alcohol or use drugs.    Allergies:  Review of patient's allergies indicates:   Allergen Reactions    Altace [ramipril] Anaphylaxis    Adhesive tape-silicones Blisters       Medications:  Current Outpatient Medications   Medication Sig Dispense Refill    apixaban (ELIQUIS) 5 mg Tab Take 5 mg by mouth 2 (two) times daily.      aspirin 81 MG Chew Take 81 mg by mouth once daily.      calcium carbonate (OS-CAMILA) 600 mg (1,500 mg) Tab Take 600 mg by mouth 2 (two) times daily with meals.      finasteride (PROSCAR) 5 mg tablet       furosemide (LASIX) 40 MG tablet Take 40 mg by mouth once daily.      iron polysaccharides (NIFEREX) 150 mg iron Cap Take 150 mg by mouth 2 (two) times daily.      metformin (GLUCOPHAGE) 500 MG tablet Take 1,000 mg by mouth 2 (two) times daily with meals.       metoprolol succinate (TOPROL-XL) 12.5 MG 24 hr split tablet Take 25 mg by mouth 2 (two) times daily.      MULTIVIT-IRON-MIN-FOLIC ACID 3,500-18-0.4 UNIT-MG-MG ORAL CHEW Take by mouth.      pantoprazole (PROTONIX) 40 MG tablet Take 40 mg by mouth once daily.      pravastatin (PRAVACHOL) 10 MG tablet Take 10 mg by mouth once daily.      tamsulosin (FLOMAX) 0.4 mg Cp24       verapamil (VERELAN PM) 300 mg 24 hr capsule       vitamin D 1000 units Tab Take 2,000 Units by mouth once daily.       No current facility-administered medications for this visit.   "      Review of Systems   Constitutional: Negative for chills, fatigue and fever.   HENT: Negative for nosebleeds.    Respiratory: Negative for cough and shortness of breath.    Cardiovascular: Positive for leg swelling (chronic). Negative for chest pain.   Gastrointestinal: Negative for abdominal distention, abdominal pain, constipation, diarrhea and nausea.   Genitourinary: Negative for dysuria.   Neurological: Negative for dizziness, weakness, light-headedness and numbness.   Hematological: Bruises/bleeds easily (chronic anticoagulation).   Psychiatric/Behavioral: Negative for confusion.       ECOG Performance Status: 0   Objective:      Vitals:   Vitals:    18 1312   BP: 134/86   BP Location: Right arm   Pulse: 110   Resp: 20   Temp: 97.5 °F (36.4 °C)   TempSrc: Oral   SpO2: 99%   Weight: 113.4 kg (249 lb 14.4 oz)   Height: 5' 4" (1.626 m)     BMI: Body mass index is 42.9 kg/m².    Physical Exam   Constitutional: He appears well-developed.   HENT:   Head: Normocephalic.   Eyes: Pupils are equal, round, and reactive to light. No scleral icterus.   Neck: Normal range of motion. No tracheal deviation present.   Cardiovascular: Normal rate. Exam reveals no gallop and no friction rub.   No murmur heard.  Irregularly irregular   Pulmonary/Chest: Effort normal and breath sounds normal. No respiratory distress. He has no wheezes. He has no rales.   Abdominal: Soft. Bowel sounds are normal. He exhibits no distension and no mass. There is no tenderness. There is no guarding.   Musculoskeletal: Normal range of motion. He exhibits edema (compression stockings on).   Lymphadenopathy:     He has no cervical adenopathy.   Neurological: He is alert.   Skin: Skin is warm and dry.   Psychiatric: He has a normal mood and affect.       Laboratory Data:     Imagin2018 PET CT  FINDINGS:  Patient was administered 2.1 millicuries of FDG intravenously.    Intracranial structures are normal.  There is a intraparotid " lymph node versus a parotid lesion very small SUV max 7.58.  There is physiologic intracranial, head, and neck activity.  The lesion at the dome of the liver in the inferior right lobe of the liver are isointense to adjacent liver.  The heart mediastinum show nothing unusual.  There is physiologic spleen, pancreas, GI  adrenal and kidney activity.  Bowel loops show nothing unusual.  The bones reveal nothing.  Left lung is clear.  Right upper lobe lung abnormality SUV max 2.0.      Impression       See above    The dome liver lesion and the inferior right liver lesion demonstrate activity isointense to background liver.  This could still be very low-grade neoplasm.    The right upper lobe abnormality could be benign or malignant.    Right parotid lesion could be an inflamed parotid lymph node or a 2nd primary neoplasm.    Index right parotid lesion SUV max 7.58.        Assessment:       1. Primary adenocarcinoma of upper lobe of right lung           Plan:       1. Right upper lobe adenocarcinoma of the lung, [id number], Stage IB.  Not a surgical candidate for resection presumably due to age and comorbidities (DRISCOLL cirrhosis, HCC).  -Consented for SBRT.  Do not anticipate any significant side effects.  -Normally would treat with 50 Gy divided into 5 fractions (every other day treatment).  However, he lives in [place], [place], which is a long commute so may get fewer fractions with higher doses (ie. 12 Gy/fraction x3 fractions)    No orders of the defined types were placed in this encounter.          Jared Sinclair MD  Hematology Oncology Fellow PGY5  Distress Screening Results: Psychosocial Distress screening score of Distress Score: 0 noted and reviewed. No intervention indicated.

## 2018-11-06 NOTE — LETTER
November 6, 2018      Monica Ahmadi MD  [address]  [facility] [address]           [facility] - Radiation Oncology  [address]  [facility] [address]  Phone: [phone]          Patient: Hossein Sanchez Sr.   MR Number: [id number]   YOB: 1940   Date of Visit: 11/6/2018       Dear Dr. Monica Ahmadi:    Thank you for referring Hossein Sanchez to me for evaluation. Attached you will find relevant portions of my assessment and plan of care.    If you have questions, please do not hesitate to call me. I look forward to following Hossein Sanchez along with you.    Sincerely,    Jonathan Morales MD    Enclosure  CC:  No Recipients    If you would like to receive this communication electronically, please contact [email or web address] or [phone] to request more information on [employer] Link access.    For providers and/or their staff who would like to refer a patient to [facility], please contact us through our one-stop-shop provider referral line, [facility], at [phone].    If you feel you have received this communication in error or would no longer like to receive these types of communications, please e-mail [email or web address]

## 2018-11-30 NOTE — PLAN OF CARE
Problem: Patient Care Overview  Goal: Plan of Care Review  Outcome: Ongoing (interventions implemented as appropriate)  Day 3 of radiation to the lung sob noted on exertion

## 2018-12-03 NOTE — PLAN OF CARE
Problem: Patient Care Overview  Goal: Plan of Care Review  Outcome: Ongoing (interventions implemented as appropriate)  Day 4 of radiation to the lung mild sob noted when ambulating

## 2018-12-05 PROBLEM — K11.9 LESION OF PAROTID GLAND: Status: ACTIVE | Noted: 2018-01-01

## 2018-12-05 NOTE — PROGRESS NOTES
Cc; Hepatocellular carcinoma, followup        HPI:  is a 79yo WM with  HTN, HLD, Afib (on eliquis), CVA, and lung and liver cancer.  Abdominal pain in 2016 led to CT imaging and laparoscopic liver biopsy on 5/2016 with benign pathology. Surveillance CT scan by pulmonology for lung nodule noted increase in size of lung lesion (3.0x2.8cm) and also noted cirrhotic liver with a right liver lobe lesion (98i43t0.1cm). 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   Patient was evaluated and felt not to be a surgical candidate for resection of lung or liver lesion. He has established with radiation oncology with plans to undergo SBRT of his lung lesion.  He established with IR 3/1/18 and underwent mapping for Y-90 on 3/8/18.   He underwent radioembolization on 3/29/2018. He had response to treatment, but still had residual enhancement of the liver mass on MRI done in June 2018.  He then had right hepatic lobe y90 radioembolization on 8/8/18 and again on 9/25/18 ( now to the left side, segment 2).  He is here for a follow up. He had CT chest w/o contrast on 10/2/18. It showed a right UL paramediastinal nodule, now 3.4 x 2.8cm, previously ( in February 2018), 2.9 x 2.5cm.      Interval History: he has started RT to his lung. He has fatigue, feels bloated, and is not eating well.        Current Outpatient Medications   Medication Sig    apixaban (ELIQUIS) 5 mg Tab Take 5 mg by mouth 2 (two) times daily.    aspirin 81 MG Chew Take 81 mg by mouth once daily.    calcium carbonate (OS-CAMILA) 600 mg (1,500 mg) Tab Take 600 mg by mouth 2 (two) times daily with meals.    finasteride (PROSCAR) 5 mg tablet     furosemide (LASIX) 80 MG tablet     iron polysaccharides (NIFEREX) 150 mg iron Cap Take 150 mg by mouth 2 (two) times daily.    metformin (GLUCOPHAGE) 500 MG tablet Take 1,000 mg by mouth 2 (two) times daily with meals.      metoprolol succinate (TOPROL-XL) 12.5 MG 24 hr split tablet Take 25 mg by mouth 2 (two) times daily.    MULTIVIT-IRON-MIN-FOLIC ACID 3,500-18-0.4 UNIT-MG-MG ORAL CHEW Take by mouth.    pantoprazole (PROTONIX) 40 MG tablet Take 40 mg by mouth once daily.    pravastatin (PRAVACHOL) 10 MG tablet Take 10 mg by mouth once daily.    tamsulosin (FLOMAX) 0.4 mg Cp24     verapamil (VERELAN PM) 300 mg 24 hr capsule     vitamin D 1000 units Tab Take 2,000 Units by mouth once daily.     No current facility-administered medications for this visit.            Vitals:    12/05/18 1017   BP: (!) 142/68   Pulse: 85   Resp: 18   Temp: 98.1 °F (36.7 °C)       Physical Exam   Constitutional: He appears well-developed.   HENT:   Head: Normocephalic and atraumatic.   Mouth/Throat: No oropharyngeal exudate.   Eyes: No scleral icterus.   Neck: Normal range of motion.   Cardiovascular: Normal rate and regular rhythm.   No murmur heard.  Pulmonary/Chest: Effort normal and breath sounds normal. No respiratory distress. He has no wheezes.   Abdominal: Soft. He exhibits no distension. There is no tenderness. There is no rebound.   Musculoskeletal: He exhibits edema.   Lymphadenopathy:     He has no cervical adenopathy.   Skin: Skin is warm.   Psychiatric: He has a normal mood and affect.     6/29/18 MRI abdomen           COMPARISON:  MRI abdomen with/without contrast 02/12/2018    FINDINGS:  Inferior Thorax: Normal.    Liver: The liver is at the upper limits of normal in size with the right hepatic lobe spanning approximately 17.5 cm.  A large, heterogeneous mass centered in hepatic segment V and VI is reduced in size now measuring approximately 9.2 x 5.4 cm consistent with post treatment change.  There is interval reduction in the degree of postcontrast enhancement with now only minimal nodular enhancement along the inferior margin and no appreciable washout on delayed phase imaging (axial series 10, image 53).  Additionally, there has  been reduction in size of a mass lesion involving hepatic segment VIII now measuring approximately 5.9 x 6.3 cm.  Degree of enhancing components is reduced, however there is persistent nodular enhancement along the lateral margin of the lesion which demonstrates washout on delayed phase imaging.  Largest nodular component measures 2.0 cm, best appreciated on axial series 10, image 20.  Small focus of enhancement without delayed phase washout is again present within the anterior margin of hepatic segment II, indeterminate in appearance.  Scattered nonenhancing lesions are again present throughout the right and left hepatic lobes consistent with post treatment change.    Gallbladder: Surgically absent.    Bile Ducts: No dilatation.    Pancreas: Pancreas demonstrates involutional change.  No pancreatic mass lesion or peripancreatic inflammation.    Spleen: Enlarged measuring up to 13.5 cm in long axis.  Multiple splenic collaterals are present.    Adrenals: Normal.    Kidneys/Ureters: Kidneys are normal in size and location.  Normal post-contrast enhancement.  There is a small T2 hyperintense lesion along the lower pole of the left kidney most consistent with simple renal cyst.    GI Tract/Mesentery: No evidence of bowel obstruction or inflammation.    Peritoneal Space: There is a trace volume of abdominal ascites overlying the right hepatic lobe.    Retroperitoneum: No significant lymphadenopathy.    Abdominal wall: Normal.    Vasculature: No aneurysm.    Bones: No significant abnormality.        Impression          Interval reduction in size and degree of enhancement of 2 heterogeneous right hepatic lobe mass lesions consistent with post treatment change.  Persistent nodular enhancement along the lateral margin of the hepatic segment VIII tumor and inferior margin of the hepatic segment V/VI tumor suggests potential residual tumor as detailed above.    Stable subcentimeter focus of enhancement within hepatic segment  II remains indeterminate, however is concerning for additional tumor.    Splenomegaly.    Left simple renal cyst.    Trace volume abdominal ascites       2/16/18 FINAL PATHOLOGIC DIAGNOSIS  OUTSIDE SLIDE REVIEW  1. ORIGINAL DATE OF PROCEDURE: 11/21/2017  LIVER MASS [OSR # [id number]] (NEEDLE BIOPSY):  Hepatocellular carcinoma, well differentiated with clear cell features  Background cirrhosis (stage 4 of 4)     2. ORIGINAL DATE OF PROCEDURE 12/5/2017  LUNG MASS, RIGHT UPPER LOBE [OSR# [id number]]:  At least atypical adenomatous hyperplasia, 1.5 millimeters  See comment  COMMENT: Sections show an atypical mucinous proliferation with lepidic pattern (no definite invasion). The differentiation between atypical adenomatous hyperplasia and adenocarcinoma in situ is based on size of the  lesion; clinical correlation required. Dr. GABRIEL Lieberman has reviewed this case and concurs with the interpretation. The findings in the lung are not histologically similar to the findings in the liver.       11/5/18 PET CT    FINDINGS:  Patient was administered 2.1 millicuries of FDG intravenously.    Intracranial structures are normal.  There is a intraparotid lymph node versus a parotid lesion very small SUV max 7.58.  There is physiologic intracranial, head, and neck activity.  The lesion at the dome of the liver in the inferior right lobe of the liver are isointense to adjacent liver.  The heart mediastinum show nothing unusual.  There is physiologic spleen, pancreas, GI  adrenal and kidney activity.  Bowel loops show nothing unusual.  The bones reveal nothing.  Left lung is clear.  Right upper lobe lung abnormality SUV max 2.0.      Impression       See above    The dome liver lesion and the inferior right liver lesion demonstrate activity isointense to background liver.  This could still be very low-grade neoplasm.    The right upper lobe abnormality could be benign or malignant.    Right parotid lesion could be an inflamed parotid  lymph node or a 2nd primary neoplasm.    Index right parotid lesion SUV max 7.58.  Is       11/6/18 MRI brain with cont    COMPARISON:  None.    FINDINGS:  Intracranial compartment:    Ventricles and sulci are normal in size for age without evidence of hydrocephalus. No extra-axial blood or fluid collections.    The brain parenchyma appears normal. No mass lesion, acute hemorrhage, edema or acute infarct. No abnormal enhancement.    Normal vascular flow voids are preserved.    Skull/extracranial contents (limited evaluation): Bone marrow signal intensity is normal.      Impression       No evidence of intracranial metastatic disease.          Assessment:     1. Hepatocellular carcinoma  2. 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 Obesity  4. Atrial fibrillation  5. Hypertension   6. Diabetes mellitus type 2  7. Pedal edema     Plan:     1. He has been treated with Y90 radio embolization three times so far, most recently in September 2018. He is awaiting repeat imaging (MRIm planned for December 2018). He feels well. His liver function is within normal mlimits in October 2018. No pain.  PET CT on 11/5/18 showed that the lesion at the dome of the liver in the inferior right lobe of the liver were isointense to adjacent liver.    2.  CT done on 10/2/18 showed increase in the size of the previously known lesion. The right UL paramediastinal nodule, now 3.4 x 2.8cm, previously ( in February 2018), 2.9 x 2.5cm. MRI brain on 11/6/18 was negative for intra cranial metastases. PET CT( although the lung lesion was not PET avid previously) on 11/5/18 showed an intraparotid lymph node versus a parotid lesion very small SUV max 7.58.  There was physiologic intracranial, head, and neck activity.  There was physiologic spleen, pancreas, GI  adrenal and kidney activity.  Bowel loops showed nothing unusual.  Left lung was clear.  Right upper lobe lung abnormality was seen, SUV max 2.0.He has been evaluated by radiation oncology here  and has been receiving RT to lung.     3. Exercise and calorie reduction suggested  4. On Eliquis. Rate controlled  5. BP well controlled on Toprol XL  6. Blood glucose well controlled on Metformin. Not on Insulin  7. He is on diuretic, but has been off of it for the past 3 days.   8. Right parotic abnormality : Noted on PET CT done in Nov 2018. He is asymptomatic. No palpable mas. Will repeat CT in 2-3 months.            Distress Screening Results: Psychosocial Distress screening score of Distress Score: 0 noted and reviewed. No intervention indicated.

## 2018-12-05 NOTE — LETTER
December 5, 2018      Po Castro MD  [address]  Pulmonology Dept [address]  [facility] [address]           [facility] H[facility]ology On[facility]  [address]  [place] [address]  Phone: [phone]          Patient: Hossein Sanchez Sr.   MR Number: [id number]   YOB: 1940   Date of Visit: 12/5/2018       Dear Dr. Po Castro:    Thank you for referring Hossein Sanchez to me for evaluation. Attached you will find relevant portions of my assessment and plan of care.    If you have questions, please do not hesitate to call me. I look forward to following Hossein Sanchez along with you.    Sincerely,    Monica Ahmadi MD    Enclosure  CC:  No Recipients    If you would like to receive this communication electronically, please contact [email or web address] or [phone] to request more information on [employer] Link access.    For providers and/or their staff who would like to refer a patient to [facility], please contact us through our one-stop-shop provider referral line, [facility], at [phone].    If you feel you have received this communication in error or would no longer like to receive these types of communications, please e-mail [email or web address]

## 2018-12-05 NOTE — Clinical Note
--ct neck and soft tissue with cont 2/13/19--ct chest, abdomen, pelvis with cont around 2/13/19 --cbc, cmp,inr, afp f/u around 2/13/19

## 2018-12-11 NOTE — PLAN OF CARE
Problem: Patient Care Overview  Goal: Plan of Care Review  Outcome: Outcome(s) achieved Date Met: 12/11/18  Radiation to the lung completed on 12/10/18  F/u appt made

## 2018-12-26 NOTE — TELEPHONE ENCOUNTER
Spoke to patient and wife via telephone. Notified MRI showed good treatment response. Recommendation of Dr. Lacy is to repeat MRI in 3 months. Patient's wife tells me Mr. Sanchez has had fluid build up in his abdomen. Recommended notifying Dr. Dumont. Explained I would defer to his recommendations in treating fluid accumulation. Phone number for hepatology provided.

## 2018-12-27 NOTE — TELEPHONE ENCOUNTER
Follow up call after completing radiation to the lung Mrs Sanchez states he is having a lot of abd fluid  She has a call into his liver physicians.  Dr Morales will see him in 3 months after a PET

## 2019-01-01 ENCOUNTER — TELEPHONE (OUTPATIENT)
Dept: TRANSPLANT | Facility: CLINIC | Age: 79
End: 2019-01-01

## 2019-01-01 ENCOUNTER — PATIENT MESSAGE (OUTPATIENT)
Dept: HEPATOLOGY | Facility: CLINIC | Age: 79
End: 2019-01-01

## 2019-01-01 ENCOUNTER — TELEPHONE (OUTPATIENT)
Dept: HEPATOLOGY | Facility: CLINIC | Age: 79
End: 2019-01-01

## 2019-01-01 ENCOUNTER — HOSPITAL ENCOUNTER (OUTPATIENT)
Dept: RADIOLOGY | Facility: HOSPITAL | Age: 79
Discharge: HOME | End: 2019-04-11
Attending: NURSE PRACTITIONER
Payer: MEDICARE

## 2019-01-01 ENCOUNTER — HOSPITAL ENCOUNTER (OUTPATIENT)
Dept: INTERVENTIONAL RADIOLOGY/VASCULAR | Facility: HOSPITAL | Age: 79
Discharge: HOME | End: 2019-04-10
Attending: NURSE PRACTITIONER
Payer: MEDICARE

## 2019-01-01 ENCOUNTER — HOSPITAL ENCOUNTER (OUTPATIENT)
Dept: INTERVENTIONAL RADIOLOGY/VASCULAR | Facility: HOSPITAL | Age: 79
Discharge: HOME | End: 2019-04-17
Attending: NURSE PRACTITIONER
Payer: MEDICARE

## 2019-01-01 ENCOUNTER — LAB VISIT (OUTPATIENT)
Dept: LAB | Facility: HOSPITAL | Age: 79
End: 2019-01-01
Attending: INTERNAL MEDICINE
Payer: MEDICARE

## 2019-01-01 ENCOUNTER — HOSPITAL ENCOUNTER (INPATIENT)
Facility: HOSPITAL | Age: 79
LOS: 9 days | Discharge: HOME | DRG: 291 | End: 2019-01-11
Attending: HOSPITALIST | Admitting: HOSPITALIST
Payer: MEDICARE

## 2019-01-01 ENCOUNTER — OFFICE VISIT (OUTPATIENT)
Dept: HEPATOLOGY | Facility: CLINIC | Age: 79
End: 2019-01-01
Payer: MEDICARE

## 2019-01-01 ENCOUNTER — OFFICE VISIT (OUTPATIENT)
Dept: HEMATOLOGY/ONCOLOGY | Facility: CLINIC | Age: 79
End: 2019-01-01
Payer: MEDICARE

## 2019-01-01 ENCOUNTER — OFFICE VISIT (OUTPATIENT)
Dept: RADIATION ONCOLOGY | Facility: CLINIC | Age: 79
End: 2019-01-01
Payer: MEDICARE

## 2019-01-01 ENCOUNTER — HOSPITAL ENCOUNTER (OUTPATIENT)
Dept: RADIOLOGY | Facility: HOSPITAL | Age: 79
Discharge: HOME | End: 2019-02-13
Attending: INTERNAL MEDICINE
Payer: MEDICARE

## 2019-01-01 ENCOUNTER — HOSPITAL ENCOUNTER (INPATIENT)
Facility: HOSPITAL | Age: 79
LOS: 1 days | DRG: 393 | End: 2019-04-30
Attending: HOSPITALIST | Admitting: HOSPITALIST
Payer: MEDICARE

## 2019-01-01 ENCOUNTER — OFFICE VISIT (OUTPATIENT)
Dept: HEPATOLOGY | Facility: CLINIC | Age: 79
DRG: 291 | End: 2019-01-01
Payer: MEDICARE

## 2019-01-01 ENCOUNTER — HOSPITAL ENCOUNTER (OUTPATIENT)
Dept: RADIOLOGY | Facility: HOSPITAL | Age: 79
Discharge: HOME | End: 2019-03-13
Attending: RADIOLOGY
Payer: MEDICARE

## 2019-01-01 VITALS
SYSTOLIC BLOOD PRESSURE: 120 MMHG | SYSTOLIC BLOOD PRESSURE: 128 MMHG | BODY MASS INDEX: 39.37 KG/M2 | WEIGHT: 230.63 LBS | WEIGHT: 271.38 LBS | TEMPERATURE: 97 F | HEART RATE: 83 BPM | TEMPERATURE: 98 F | HEART RATE: 81 BPM | DIASTOLIC BLOOD PRESSURE: 67 MMHG | HEIGHT: 64 IN | OXYGEN SATURATION: 98 % | DIASTOLIC BLOOD PRESSURE: 70 MMHG | BODY MASS INDEX: 46.33 KG/M2 | RESPIRATION RATE: 18 BRPM | HEIGHT: 64 IN | OXYGEN SATURATION: 99 %

## 2019-01-01 VITALS
DIASTOLIC BLOOD PRESSURE: 66 MMHG | HEART RATE: 116 BPM | SYSTOLIC BLOOD PRESSURE: 122 MMHG | RESPIRATION RATE: 16 BRPM | OXYGEN SATURATION: 98 %

## 2019-01-01 VITALS
SYSTOLIC BLOOD PRESSURE: 117 MMHG | BODY MASS INDEX: 38.24 KG/M2 | HEIGHT: 64 IN | OXYGEN SATURATION: 97 % | TEMPERATURE: 98 F | RESPIRATION RATE: 20 BRPM | WEIGHT: 224 LBS | DIASTOLIC BLOOD PRESSURE: 78 MMHG | HEART RATE: 108 BPM

## 2019-01-01 VITALS
BODY MASS INDEX: 37.71 KG/M2 | SYSTOLIC BLOOD PRESSURE: 117 MMHG | HEART RATE: 88 BPM | TEMPERATURE: 98 F | RESPIRATION RATE: 18 BRPM | WEIGHT: 220.88 LBS | OXYGEN SATURATION: 100 % | DIASTOLIC BLOOD PRESSURE: 73 MMHG | HEIGHT: 64 IN

## 2019-01-01 VITALS
DIASTOLIC BLOOD PRESSURE: 54 MMHG | HEART RATE: 109 BPM | RESPIRATION RATE: 22 BRPM | SYSTOLIC BLOOD PRESSURE: 90 MMHG | TEMPERATURE: 97 F | OXYGEN SATURATION: 99 %

## 2019-01-01 VITALS
OXYGEN SATURATION: 96 % | RESPIRATION RATE: 18 BRPM | SYSTOLIC BLOOD PRESSURE: 132 MMHG | DIASTOLIC BLOOD PRESSURE: 61 MMHG | HEART RATE: 67 BPM | TEMPERATURE: 97 F | BODY MASS INDEX: 40.63 KG/M2 | HEIGHT: 64 IN | WEIGHT: 238 LBS

## 2019-01-01 VITALS
WEIGHT: 223.38 LBS | DIASTOLIC BLOOD PRESSURE: 94 MMHG | TEMPERATURE: 98 F | OXYGEN SATURATION: 99 % | RESPIRATION RATE: 20 BRPM | SYSTOLIC BLOOD PRESSURE: 128 MMHG | BODY MASS INDEX: 38.35 KG/M2 | HEART RATE: 102 BPM

## 2019-01-01 VITALS
HEART RATE: 98 BPM | DIASTOLIC BLOOD PRESSURE: 85 MMHG | SYSTOLIC BLOOD PRESSURE: 132 MMHG | RESPIRATION RATE: 18 BRPM | OXYGEN SATURATION: 95 %

## 2019-01-01 VITALS
SYSTOLIC BLOOD PRESSURE: 106 MMHG | OXYGEN SATURATION: 100 % | BODY MASS INDEX: 37.92 KG/M2 | TEMPERATURE: 98 F | HEART RATE: 80 BPM | DIASTOLIC BLOOD PRESSURE: 71 MMHG | WEIGHT: 220.88 LBS | RESPIRATION RATE: 18 BRPM

## 2019-01-01 DIAGNOSIS — R18.8 OTHER ASCITES: Primary | ICD-10-CM

## 2019-01-01 DIAGNOSIS — K74.60 CIRRHOSIS OF LIVER WITH ASCITES, UNSPECIFIED HEPATIC CIRRHOSIS TYPE: Primary | ICD-10-CM

## 2019-01-01 DIAGNOSIS — K75.81 LIVER CIRRHOSIS SECONDARY TO NASH: ICD-10-CM

## 2019-01-01 DIAGNOSIS — C22.0 HCC (HEPATOCELLULAR CARCINOMA): ICD-10-CM

## 2019-01-01 DIAGNOSIS — C34.11 PRIMARY ADENOCARCINOMA OF UPPER LOBE OF RIGHT LUNG: ICD-10-CM

## 2019-01-01 DIAGNOSIS — N17.9 ACUTE KIDNEY INJURY: ICD-10-CM

## 2019-01-01 DIAGNOSIS — D50.9 IRON DEFICIENCY ANEMIA, UNSPECIFIED IRON DEFICIENCY ANEMIA TYPE: ICD-10-CM

## 2019-01-01 DIAGNOSIS — K74.60 CIRRHOSIS OF LIVER WITHOUT ASCITES, UNSPECIFIED HEPATIC CIRRHOSIS TYPE: ICD-10-CM

## 2019-01-01 DIAGNOSIS — R18.8 CIRRHOSIS OF LIVER WITH ASCITES, UNSPECIFIED HEPATIC CIRRHOSIS TYPE: ICD-10-CM

## 2019-01-01 DIAGNOSIS — K74.60 LIVER CIRRHOSIS SECONDARY TO NASH: ICD-10-CM

## 2019-01-01 DIAGNOSIS — K63.1 BOWEL PERFORATION: ICD-10-CM

## 2019-01-01 DIAGNOSIS — R18.8 OTHER ASCITES: ICD-10-CM

## 2019-01-01 DIAGNOSIS — R18.8 CIRRHOSIS OF LIVER WITH ASCITES, UNSPECIFIED HEPATIC CIRRHOSIS TYPE: Primary | ICD-10-CM

## 2019-01-01 DIAGNOSIS — K74.60 CIRRHOSIS OF LIVER WITH ASCITES, UNSPECIFIED HEPATIC CIRRHOSIS TYPE: ICD-10-CM

## 2019-01-01 DIAGNOSIS — K74.60 DECOMPENSATED HEPATIC CIRRHOSIS: Primary | ICD-10-CM

## 2019-01-01 DIAGNOSIS — C22.0 HCC (HEPATOCELLULAR CARCINOMA): Primary | ICD-10-CM

## 2019-01-01 DIAGNOSIS — R60.0 LEG EDEMA: ICD-10-CM

## 2019-01-01 DIAGNOSIS — R00.0 TACHYCARDIA: ICD-10-CM

## 2019-01-01 DIAGNOSIS — C22.0 HEPATOCELLULAR CARCINOMA: ICD-10-CM

## 2019-01-01 DIAGNOSIS — C34.01 MALIGNANT NEOPLASM OF HILUS OF RIGHT LUNG: Primary | ICD-10-CM

## 2019-01-01 DIAGNOSIS — R60.1 ANASARCA: ICD-10-CM

## 2019-01-01 DIAGNOSIS — K72.90 DECOMPENSATED HEPATIC CIRRHOSIS: Primary | ICD-10-CM

## 2019-01-01 DIAGNOSIS — C34.01 MALIGNANT NEOPLASM OF HILUS OF RIGHT LUNG: ICD-10-CM

## 2019-01-01 DIAGNOSIS — R60.1 ANASARCA: Primary | ICD-10-CM

## 2019-01-01 DIAGNOSIS — K74.60 HEPATIC CIRRHOSIS, UNSPECIFIED HEPATIC CIRRHOSIS TYPE, UNSPECIFIED WHETHER ASCITES PRESENT: ICD-10-CM

## 2019-01-01 DIAGNOSIS — K74.60 DECOMPENSATED HEPATIC CIRRHOSIS: ICD-10-CM

## 2019-01-01 DIAGNOSIS — N39.0 ENTEROCOCCUS UTI: ICD-10-CM

## 2019-01-01 DIAGNOSIS — I48.91 ATRIAL FIBRILLATION: ICD-10-CM

## 2019-01-01 DIAGNOSIS — K72.90 DECOMPENSATED HEPATIC CIRRHOSIS: ICD-10-CM

## 2019-01-01 DIAGNOSIS — I48.20 CHRONIC A-FIB: ICD-10-CM

## 2019-01-01 DIAGNOSIS — E11.9 TYPE 2 DIABETES MELLITUS WITHOUT COMPLICATION, WITHOUT LONG-TERM CURRENT USE OF INSULIN: ICD-10-CM

## 2019-01-01 DIAGNOSIS — C22.0 LIVER CELL CARCINOMA: ICD-10-CM

## 2019-01-01 DIAGNOSIS — K11.9 LESION OF PAROTID GLAND: ICD-10-CM

## 2019-01-01 DIAGNOSIS — E66.01 MORBID OBESITY: ICD-10-CM

## 2019-01-01 DIAGNOSIS — D69.6 THROMBOCYTOPENIA: ICD-10-CM

## 2019-01-01 DIAGNOSIS — D63.8 ANEMIA OF CHRONIC DISEASE: ICD-10-CM

## 2019-01-01 DIAGNOSIS — I50.31 ACUTE DIASTOLIC HEART FAILURE: ICD-10-CM

## 2019-01-01 DIAGNOSIS — B95.2 ENTEROCOCCUS UTI: ICD-10-CM

## 2019-01-01 DIAGNOSIS — N30.00 ACUTE CYSTITIS WITHOUT HEMATURIA: ICD-10-CM

## 2019-01-01 DIAGNOSIS — R07.9 CHEST PAIN: ICD-10-CM

## 2019-01-01 DIAGNOSIS — E55.9 VITAMIN D DEFICIENCY: ICD-10-CM

## 2019-01-01 DIAGNOSIS — K74.60 CIRRHOSIS OF LIVER WITHOUT ASCITES, UNSPECIFIED HEPATIC CIRRHOSIS TYPE: Primary | ICD-10-CM

## 2019-01-01 LAB
AFP SERPL-MCNC: 6.2 NG/ML (ref 0–8.4)
AFP SERPL-MCNC: 9 NG/ML
ALBUMIN SERPL BCP-MCNC: 2.6 G/DL (ref 3.5–5.2)
ALBUMIN SERPL BCP-MCNC: 2.8 G/DL (ref 3.5–5.2)
ALBUMIN SERPL BCP-MCNC: 3.1 G/DL
ALBUMIN SERPL BCP-MCNC: 3.2 G/DL
ALBUMIN SERPL BCP-MCNC: 3.3 G/DL
ALBUMIN SERPL BCP-MCNC: 3.5 G/DL
ALBUMIN SERPL BCP-MCNC: 3.7 G/DL
ALLENS TEST: ABNORMAL
ALLENS TEST: ABNORMAL
ALP SERPL-CCNC: 111 U/L (ref 55–135)
ALP SERPL-CCNC: 158 U/L
ALP SERPL-CCNC: 159 U/L
ALP SERPL-CCNC: 162 U/L
ALP SERPL-CCNC: 167 U/L
ALP SERPL-CCNC: 171 U/L
ALP SERPL-CCNC: 171 U/L
ALP SERPL-CCNC: 172 U/L
ALP SERPL-CCNC: 182 U/L
ALP SERPL-CCNC: 195 U/L
ALP SERPL-CCNC: 199 U/L (ref 55–135)
ALP SERPL-CCNC: 219 U/L
ALT SERPL W/O P-5'-P-CCNC: 14 U/L (ref 10–44)
ALT SERPL W/O P-5'-P-CCNC: 18 U/L
ALT SERPL W/O P-5'-P-CCNC: 18 U/L
ALT SERPL W/O P-5'-P-CCNC: 19 U/L
ALT SERPL W/O P-5'-P-CCNC: 19 U/L (ref 10–44)
ALT SERPL W/O P-5'-P-CCNC: 23 U/L
ALT SERPL W/O P-5'-P-CCNC: 23 U/L
ALT SERPL W/O P-5'-P-CCNC: 25 U/L
ALT SERPL W/O P-5'-P-CCNC: 25 U/L
ALT SERPL W/O P-5'-P-CCNC: 26 U/L
ALT SERPL W/O P-5'-P-CCNC: 30 U/L
ALT SERPL W/O P-5'-P-CCNC: 31 U/L
ANION GAP SERPL CALC-SCNC: 10 MMOL/L
ANION GAP SERPL CALC-SCNC: 10 MMOL/L
ANION GAP SERPL CALC-SCNC: 11 MMOL/L
ANION GAP SERPL CALC-SCNC: 13 MMOL/L
ANION GAP SERPL CALC-SCNC: 13 MMOL/L
ANION GAP SERPL CALC-SCNC: 28 MMOL/L (ref 8–16)
ANION GAP SERPL CALC-SCNC: 8 MMOL/L
ANION GAP SERPL CALC-SCNC: 9 MMOL/L
ANION GAP SERPL CALC-SCNC: 9 MMOL/L (ref 8–16)
ANISOCYTOSIS BLD QL SMEAR: SLIGHT
APPEARANCE FLD: CLEAR
APPEARANCE FLD: CLEAR
APPEARANCE FLD: NORMAL
APTT BLDCRRT: 30.4 SEC (ref 21–32)
ASCENDING AORTA: 3.39 CM
AST SERPL-CCNC: 25 U/L (ref 10–40)
AST SERPL-CCNC: 31 U/L
AST SERPL-CCNC: 34 U/L
AST SERPL-CCNC: 35 U/L (ref 10–40)
AST SERPL-CCNC: 40 U/L
AST SERPL-CCNC: 45 U/L
AST SERPL-CCNC: 45 U/L
AST SERPL-CCNC: 46 U/L
AST SERPL-CCNC: 46 U/L
AST SERPL-CCNC: 47 U/L
AST SERPL-CCNC: 47 U/L
AST SERPL-CCNC: 54 U/L
AV INDEX (PROSTH): 1.09
AV MEAN GRADIENT: 4.14 MMHG
AV PEAK GRADIENT: 6.76 MMHG
AV VALVE AREA: 3.88 CM2
BACTERIA #/AREA URNS AUTO: ABNORMAL /HPF
BACTERIA SPEC AEROBE CULT: NO GROWTH
BACTERIA SPEC ANAEROBE CULT: NORMAL
BACTERIA SPEC ANAEROBE CULT: NORMAL
BACTERIA UR CULT: NORMAL
BASOPHILS # BLD AUTO: 0.02 K/UL
BASOPHILS # BLD AUTO: 0.03 K/UL
BASOPHILS # BLD AUTO: 0.03 K/UL (ref 0–0.2)
BASOPHILS # BLD AUTO: 0.05 K/UL (ref 0–0.2)
BASOPHILS NFR BLD: 0.2 %
BASOPHILS NFR BLD: 0.2 %
BASOPHILS NFR BLD: 0.2 % (ref 0–1.9)
BASOPHILS NFR BLD: 0.3 %
BASOPHILS NFR BLD: 0.3 % (ref 0–1.9)
BASOPHILS NFR BLD: 0.4 %
BASOPHILS NFR BLD: 0.5 %
BILIRUB SERPL-MCNC: 1.3 MG/DL
BILIRUB SERPL-MCNC: 1.5 MG/DL
BILIRUB SERPL-MCNC: 1.7 MG/DL
BILIRUB SERPL-MCNC: 1.7 MG/DL
BILIRUB SERPL-MCNC: 1.8 MG/DL
BILIRUB SERPL-MCNC: 1.8 MG/DL (ref 0.1–1)
BILIRUB SERPL-MCNC: 1.9 MG/DL
BILIRUB SERPL-MCNC: 1.9 MG/DL
BILIRUB SERPL-MCNC: 2 MG/DL
BILIRUB SERPL-MCNC: 2 MG/DL
BILIRUB SERPL-MCNC: 2.1 MG/DL
BILIRUB SERPL-MCNC: 3.7 MG/DL (ref 0.1–1)
BILIRUB UR QL STRIP: NEGATIVE
BODY FLD TYPE: NORMAL
BSA FOR ECHO PROCEDURE: 2.3 M2
BUN SERPL-MCNC: 119 MG/DL (ref 8–23)
BUN SERPL-MCNC: 24 MG/DL (ref 8–23)
BUN SERPL-MCNC: 25 MG/DL
BUN SERPL-MCNC: 26 MG/DL
BUN SERPL-MCNC: 29 MG/DL
BUN SERPL-MCNC: 29 MG/DL
BUN SERPL-MCNC: 31 MG/DL
BUN SERPL-MCNC: 35 MG/DL
CALCIUM SERPL-MCNC: 8.4 MG/DL
CALCIUM SERPL-MCNC: 8.4 MG/DL
CALCIUM SERPL-MCNC: 8.5 MG/DL
CALCIUM SERPL-MCNC: 8.8 MG/DL
CALCIUM SERPL-MCNC: 8.9 MG/DL
CALCIUM SERPL-MCNC: 8.9 MG/DL (ref 8.7–10.5)
CALCIUM SERPL-MCNC: 9.2 MG/DL (ref 8.7–10.5)
CALCIUM SERPL-MCNC: 9.3 MG/DL
CALCIUM SERPL-MCNC: 9.4 MG/DL
CALCIUM SERPL-MCNC: 9.6 MG/DL
CALCIUM SERPL-MCNC: 9.7 MG/DL
CALCIUM SERPL-MCNC: 9.9 MG/DL
CHLORIDE SERPL-SCNC: 100 MMOL/L
CHLORIDE SERPL-SCNC: 102 MMOL/L
CHLORIDE SERPL-SCNC: 103 MMOL/L (ref 95–110)
CHLORIDE SERPL-SCNC: 88 MMOL/L
CHLORIDE SERPL-SCNC: 88 MMOL/L
CHLORIDE SERPL-SCNC: 91 MMOL/L
CHLORIDE SERPL-SCNC: 92 MMOL/L
CHLORIDE SERPL-SCNC: 93 MMOL/L
CHLORIDE SERPL-SCNC: 95 MMOL/L
CHLORIDE SERPL-SCNC: 98 MMOL/L
CHLORIDE SERPL-SCNC: 99 MMOL/L
CHLORIDE SERPL-SCNC: 99 MMOL/L (ref 95–110)
CLARITY UR REFRACT.AUTO: ABNORMAL
CO2 SERPL-SCNC: 24 MMOL/L
CO2 SERPL-SCNC: 24 MMOL/L (ref 23–29)
CO2 SERPL-SCNC: 29 MMOL/L
CO2 SERPL-SCNC: 29 MMOL/L
CO2 SERPL-SCNC: 33 MMOL/L
CO2 SERPL-SCNC: 35 MMOL/L
CO2 SERPL-SCNC: 35 MMOL/L
CO2 SERPL-SCNC: 38 MMOL/L
CO2 SERPL-SCNC: 39 MMOL/L
CO2 SERPL-SCNC: 9 MMOL/L (ref 23–29)
COLOR FLD: YELLOW
COLOR UR AUTO: ABNORMAL
CREAT SERPL-MCNC: 1.3 MG/DL (ref 0.5–1.4)
CREAT SERPL-MCNC: 1.6 MG/DL
CREAT SERPL-MCNC: 1.7 MG/DL
CREAT SERPL-MCNC: 1.9 MG/DL
CREAT SERPL-MCNC: 2 MG/DL
CREAT SERPL-MCNC: 5.4 MG/DL (ref 0.5–1.4)
CREAT UR-MCNC: 15 MG/DL
CREAT UR-MCNC: 273 MG/DL
CV ECHO LV RWT: 0.38 CM
DELSYS: ABNORMAL
DELSYS: ABNORMAL
DIFFERENTIAL METHOD: ABNORMAL
DOP CALC AO PEAK VEL: 1.3 M/S
DOP CALC AO VTI: 20.91 CM
DOP CALC LVOT AREA: 3.56 CM2
DOP CALC LVOT DIAMETER: 2.13 CM
DOP CALC LVOT STROKE VOLUME: 81.06 CM3
DOP CALCLVOT PEAK VEL VTI: 22.76 CM
E WAVE DECELERATION TIME: 140.97 MSEC
E/A RATIO: 3.11
E/E' RATIO: 8.7
ECHO LV POSTERIOR WALL: 0.9 CM (ref 0.6–1.1)
EOSINOPHIL # BLD AUTO: 0 K/UL (ref 0–0.5)
EOSINOPHIL # BLD AUTO: 0.1 K/UL
EOSINOPHIL # BLD AUTO: 0.1 K/UL (ref 0–0.5)
EOSINOPHIL # BLD AUTO: 0.2 K/UL
EOSINOPHIL # BLD AUTO: 0.2 K/UL
EOSINOPHIL NFR BLD: 0 % (ref 0–8)
EOSINOPHIL NFR BLD: 0.9 % (ref 0–8)
EOSINOPHIL NFR BLD: 1.3 %
EOSINOPHIL NFR BLD: 1.4 %
EOSINOPHIL NFR BLD: 1.5 %
EOSINOPHIL NFR BLD: 1.6 %
EOSINOPHIL NFR BLD: 1.7 %
EOSINOPHIL NFR BLD: 1.8 %
EOSINOPHIL NFR BLD: 1.8 %
EOSINOPHIL NFR BLD: 2.2 %
EOSINOPHIL NFR BLD: 2.3 %
ERYTHROCYTE [DISTWIDTH] IN BLOOD BY AUTOMATED COUNT: 17.6 % (ref 11.5–14.5)
ERYTHROCYTE [DISTWIDTH] IN BLOOD BY AUTOMATED COUNT: 18 % (ref 11.5–14.5)
ERYTHROCYTE [DISTWIDTH] IN BLOOD BY AUTOMATED COUNT: 22.3 %
ERYTHROCYTE [DISTWIDTH] IN BLOOD BY AUTOMATED COUNT: 22.7 %
ERYTHROCYTE [DISTWIDTH] IN BLOOD BY AUTOMATED COUNT: 22.8 %
ERYTHROCYTE [DISTWIDTH] IN BLOOD BY AUTOMATED COUNT: 23.1 %
ERYTHROCYTE [DISTWIDTH] IN BLOOD BY AUTOMATED COUNT: 23.1 %
ERYTHROCYTE [DISTWIDTH] IN BLOOD BY AUTOMATED COUNT: 23.3 %
ERYTHROCYTE [DISTWIDTH] IN BLOOD BY AUTOMATED COUNT: 23.4 %
ERYTHROCYTE [DISTWIDTH] IN BLOOD BY AUTOMATED COUNT: 23.4 %
ERYTHROCYTE [DISTWIDTH] IN BLOOD BY AUTOMATED COUNT: 23.5 %
ERYTHROCYTE [DISTWIDTH] IN BLOOD BY AUTOMATED COUNT: 24.4 %
EST. GFR  (AFRICAN AMERICAN): 10.9 ML/MIN/1.73 M^2
EST. GFR  (AFRICAN AMERICAN): 36.4 ML/MIN/1.73 M^2
EST. GFR  (AFRICAN AMERICAN): 38.7 ML/MIN/1.73 M^2
EST. GFR  (AFRICAN AMERICAN): 44.3 ML/MIN/1.73 M^2
EST. GFR  (AFRICAN AMERICAN): 47.7 ML/MIN/1.73 M^2
EST. GFR  (AFRICAN AMERICAN): >60 ML/MIN/1.73 M^2
EST. GFR  (NON AFRICAN AMERICAN): 31.5 ML/MIN/1.73 M^2
EST. GFR  (NON AFRICAN AMERICAN): 33.5 ML/MIN/1.73 M^2
EST. GFR  (NON AFRICAN AMERICAN): 38.3 ML/MIN/1.73 M^2
EST. GFR  (NON AFRICAN AMERICAN): 41.2 ML/MIN/1.73 M^2
EST. GFR  (NON AFRICAN AMERICAN): 52.6 ML/MIN/1.73 M^2
EST. GFR  (NON AFRICAN AMERICAN): 9.4 ML/MIN/1.73 M^2
ESTIMATED AVG GLUCOSE: 103 MG/DL
ESTIMATED AVG GLUCOSE: 143 MG/DL (ref 68–131)
FIBRINOGEN PPP-MCNC: 283 MG/DL
FIO2: 100
FIO2: 100
FLOW: 15
FLOW: 15
FRACTIONAL SHORTENING: 40 % (ref 28–44)
GLUCOSE SERPL-MCNC: 116 MG/DL
GLUCOSE SERPL-MCNC: 117 MG/DL
GLUCOSE SERPL-MCNC: 124 MG/DL
GLUCOSE SERPL-MCNC: 126 MG/DL
GLUCOSE SERPL-MCNC: 127 MG/DL
GLUCOSE SERPL-MCNC: 127 MG/DL
GLUCOSE SERPL-MCNC: 130 MG/DL
GLUCOSE SERPL-MCNC: 133 MG/DL
GLUCOSE SERPL-MCNC: 136 MG/DL
GLUCOSE SERPL-MCNC: 137 MG/DL
GLUCOSE SERPL-MCNC: 167 MG/DL (ref 70–110)
GLUCOSE SERPL-MCNC: 194 MG/DL (ref 70–110)
GLUCOSE UR QL STRIP: NEGATIVE
HAPTOGLOB SERPL-MCNC: 148 MG/DL
HBA1C MFR BLD HPLC: 5.2 %
HBA1C MFR BLD HPLC: 6.6 % (ref 4–5.6)
HCO3 UR-SCNC: 7 MMOL/L (ref 24–28)
HCT VFR BLD AUTO: 29.7 %
HCT VFR BLD AUTO: 29.9 %
HCT VFR BLD AUTO: 30.1 %
HCT VFR BLD AUTO: 30.7 %
HCT VFR BLD AUTO: 30.9 %
HCT VFR BLD AUTO: 31.9 %
HCT VFR BLD AUTO: 32.4 %
HCT VFR BLD AUTO: 32.5 %
HCT VFR BLD AUTO: 32.5 %
HCT VFR BLD AUTO: 32.8 %
HCT VFR BLD AUTO: 35.9 % (ref 40–54)
HCT VFR BLD AUTO: 44.6 % (ref 40–54)
HCT VFR BLD CALC: 42 %PCV (ref 36–54)
HGB BLD-MCNC: 10.1 G/DL
HGB BLD-MCNC: 10.2 G/DL
HGB BLD-MCNC: 12.2 G/DL (ref 14–18)
HGB BLD-MCNC: 15.1 G/DL (ref 14–18)
HGB BLD-MCNC: 9.2 G/DL
HGB BLD-MCNC: 9.4 G/DL
HGB BLD-MCNC: 9.5 G/DL
HGB BLD-MCNC: 9.8 G/DL
HGB BLD-MCNC: 9.8 G/DL
HGB BLD-MCNC: 9.9 G/DL
HGB UR QL STRIP: NEGATIVE
HYALINE CASTS UR QL AUTO: 70 /LPF
HYPOCHROMIA BLD QL SMEAR: ABNORMAL
HYPOCHROMIA BLD QL SMEAR: ABNORMAL
IMM GRANULOCYTES # BLD AUTO: 0.02 K/UL
IMM GRANULOCYTES # BLD AUTO: 0.03 K/UL
IMM GRANULOCYTES # BLD AUTO: 0.04 K/UL
IMM GRANULOCYTES # BLD AUTO: 0.11 K/UL (ref 0–0.04)
IMM GRANULOCYTES # BLD AUTO: 0.21 K/UL (ref 0–0.04)
IMM GRANULOCYTES NFR BLD AUTO: 0.3 %
IMM GRANULOCYTES NFR BLD AUTO: 0.4 %
IMM GRANULOCYTES NFR BLD AUTO: 0.5 %
IMM GRANULOCYTES NFR BLD AUTO: 0.9 % (ref 0–0.5)
IMM GRANULOCYTES NFR BLD AUTO: 1 % (ref 0–0.5)
INR PPP: 1.5
INR PPP: 1.5
INR PPP: 1.6
INR PPP: 1.6 (ref 0.8–1.2)
INR PPP: 1.7
INR PPP: 2.2 (ref 0.8–1.2)
INTERVENTRICULAR SEPTUM: 0.9 CM (ref 0.6–1.1)
IVRT: 0.06 MSEC
KETONES UR QL STRIP: NEGATIVE
LA MAJOR: 7.62 CM
LA MINOR: 7.58 CM
LA WIDTH: 5.5 CM
LDH SERPL L TO P-CCNC: 166 U/L
LDH SERPL L TO P-CCNC: 8.98 MMOL/L (ref 0.36–1.25)
LEFT ATRIUM SIZE: 5.61 CM
LEFT ATRIUM VOLUME INDEX: 91.3 ML/M2
LEFT ATRIUM VOLUME: 199.32 CM3
LEFT INTERNAL DIMENSION IN SYSTOLE: 2.8 CM (ref 2.1–4)
LEFT VENTRICLE DIASTOLIC VOLUME INDEX: 39.04 ML/M2
LEFT VENTRICLE DIASTOLIC VOLUME: 85.24 ML
LEFT VENTRICLE MASS INDEX: 65.4 G/M2
LEFT VENTRICLE SYSTOLIC VOLUME INDEX: 13.2 ML/M2
LEFT VENTRICLE SYSTOLIC VOLUME: 28.91 ML
LEFT VENTRICULAR INTERNAL DIMENSION IN DIASTOLE: 4.7 CM (ref 3.5–6)
LEFT VENTRICULAR MASS: 142.71 G
LEUKOCYTE ESTERASE UR QL STRIP: ABNORMAL
LV LATERAL E/E' RATIO: 7.25
LV SEPTAL E/E' RATIO: 10.88
LYMPHOCYTES # BLD AUTO: 0.2 K/UL (ref 1–4.8)
LYMPHOCYTES # BLD AUTO: 0.3 K/UL
LYMPHOCYTES # BLD AUTO: 0.4 K/UL
LYMPHOCYTES # BLD AUTO: 0.4 K/UL (ref 1–4.8)
LYMPHOCYTES NFR BLD: 1 % (ref 18–48)
LYMPHOCYTES NFR BLD: 3.7 % (ref 18–48)
LYMPHOCYTES NFR BLD: 4.2 %
LYMPHOCYTES NFR BLD: 4.2 %
LYMPHOCYTES NFR BLD: 4.9 %
LYMPHOCYTES NFR BLD: 4.9 %
LYMPHOCYTES NFR BLD: 5 %
LYMPHOCYTES NFR BLD: 5.3 %
LYMPHOCYTES NFR BLD: 5.4 %
LYMPHOCYTES NFR BLD: 5.5 %
LYMPHOCYTES NFR BLD: 5.9 %
LYMPHOCYTES NFR FLD MANUAL: 24 %
LYMPHOCYTES NFR FLD MANUAL: 34 %
LYMPHOCYTES NFR FLD MANUAL: 52 %
MAGNESIUM SERPL-MCNC: 1.1 MG/DL
MAGNESIUM SERPL-MCNC: 1.2 MG/DL
MAGNESIUM SERPL-MCNC: 1.3 MG/DL
MAGNESIUM SERPL-MCNC: 1.4 MG/DL
MAGNESIUM SERPL-MCNC: 1.5 MG/DL
MAGNESIUM SERPL-MCNC: 1.5 MG/DL
MAGNESIUM SERPL-MCNC: 1.6 MG/DL
MAGNESIUM SERPL-MCNC: 1.6 MG/DL
MAGNESIUM SERPL-MCNC: 1.9 MG/DL
MCH RBC QN AUTO: 24.6 PG
MCH RBC QN AUTO: 24.8 PG
MCH RBC QN AUTO: 25 PG
MCH RBC QN AUTO: 25 PG
MCH RBC QN AUTO: 25.1 PG
MCH RBC QN AUTO: 25.1 PG
MCH RBC QN AUTO: 25.3 PG
MCH RBC QN AUTO: 25.4 PG
MCH RBC QN AUTO: 25.6 PG
MCH RBC QN AUTO: 25.8 PG
MCH RBC QN AUTO: 31.4 PG (ref 27–31)
MCH RBC QN AUTO: 31.9 PG (ref 27–31)
MCHC RBC AUTO-ENTMCNC: 30.6 G/DL
MCHC RBC AUTO-ENTMCNC: 31 G/DL
MCHC RBC AUTO-ENTMCNC: 31.1 G/DL
MCHC RBC AUTO-ENTMCNC: 31.2 G/DL
MCHC RBC AUTO-ENTMCNC: 31.6 G/DL
MCHC RBC AUTO-ENTMCNC: 31.7 G/DL
MCHC RBC AUTO-ENTMCNC: 31.8 G/DL
MCHC RBC AUTO-ENTMCNC: 31.9 G/DL
MCHC RBC AUTO-ENTMCNC: 33.9 G/DL (ref 32–36)
MCHC RBC AUTO-ENTMCNC: 34 G/DL (ref 32–36)
MCV RBC AUTO: 78 FL
MCV RBC AUTO: 78 FL
MCV RBC AUTO: 80 FL
MCV RBC AUTO: 81 FL
MCV RBC AUTO: 82 FL
MCV RBC AUTO: 83 FL
MCV RBC AUTO: 93 FL (ref 82–98)
MCV RBC AUTO: 94 FL (ref 82–98)
MESOTHL CELL NFR FLD MANUAL: 1 %
MESOTHL CELL NFR FLD MANUAL: 3 %
MESOTHL CELL NFR FLD MANUAL: 7 %
MICROSCOPIC COMMENT: ABNORMAL
MODE: ABNORMAL
MODE: ABNORMAL
MONOCYTES # BLD AUTO: 0.7 K/UL (ref 0.3–1)
MONOCYTES # BLD AUTO: 0.8 K/UL
MONOCYTES # BLD AUTO: 0.9 K/UL
MONOCYTES # BLD AUTO: 0.9 K/UL
MONOCYTES # BLD AUTO: 1 K/UL
MONOCYTES # BLD AUTO: 1 K/UL (ref 0.3–1)
MONOCYTES # BLD AUTO: 1.1 K/UL
MONOCYTES # BLD AUTO: 1.1 K/UL
MONOCYTES # BLD AUTO: 1.2 K/UL
MONOCYTES NFR BLD: 12.5 %
MONOCYTES NFR BLD: 12.8 %
MONOCYTES NFR BLD: 13.6 %
MONOCYTES NFR BLD: 13.6 %
MONOCYTES NFR BLD: 13.9 %
MONOCYTES NFR BLD: 14.3 %
MONOCYTES NFR BLD: 14.7 %
MONOCYTES NFR BLD: 14.7 %
MONOCYTES NFR BLD: 14.8 %
MONOCYTES NFR BLD: 2.7 % (ref 4–15)
MONOCYTES NFR BLD: 8.9 % (ref 4–15)
MONOS+MACROS NFR FLD MANUAL: 11 %
MONOS+MACROS NFR FLD MANUAL: 6 %
MONOS+MACROS NFR FLD MANUAL: 8 %
MV PEAK A VEL: 0.28 M/S
MV PEAK E VEL: 0.87 M/S
NEUTROPHILS # BLD AUTO: 22.8 K/UL (ref 1.8–7.7)
NEUTROPHILS # BLD AUTO: 4.2 K/UL
NEUTROPHILS # BLD AUTO: 4.3 K/UL
NEUTROPHILS # BLD AUTO: 4.8 K/UL
NEUTROPHILS # BLD AUTO: 5.1 K/UL
NEUTROPHILS # BLD AUTO: 5.2 K/UL
NEUTROPHILS # BLD AUTO: 5.5 K/UL
NEUTROPHILS # BLD AUTO: 5.6 K/UL
NEUTROPHILS # BLD AUTO: 6.7 K/UL
NEUTROPHILS # BLD AUTO: 6.8 K/UL
NEUTROPHILS # BLD AUTO: 9.1 K/UL (ref 1.8–7.7)
NEUTROPHILS NFR BLD: 77.5 %
NEUTROPHILS NFR BLD: 77.8 %
NEUTROPHILS NFR BLD: 78.2 %
NEUTROPHILS NFR BLD: 78.3 %
NEUTROPHILS NFR BLD: 78.5 %
NEUTROPHILS NFR BLD: 78.6 %
NEUTROPHILS NFR BLD: 78.6 %
NEUTROPHILS NFR BLD: 78.9 %
NEUTROPHILS NFR BLD: 80.8 %
NEUTROPHILS NFR BLD: 85.2 % (ref 38–73)
NEUTROPHILS NFR BLD: 95.2 % (ref 38–73)
NEUTROPHILS NFR FLD MANUAL: 39 %
NEUTROPHILS NFR FLD MANUAL: 57 %
NEUTROPHILS NFR FLD MANUAL: 58 %
NITRITE UR QL STRIP: NEGATIVE
NRBC BLD-RTO: 0 /100 WBC
OVALOCYTES BLD QL SMEAR: ABNORMAL
OVALOCYTES BLD QL SMEAR: ABNORMAL
PCO2 BLDA: 12.1 MMHG (ref 35–45)
PH SMN: 7.37 [PH] (ref 7.35–7.45)
PH UR STRIP: 5 [PH] (ref 5–8)
PHOSPHATE SERPL-MCNC: 2.6 MG/DL
PHOSPHATE SERPL-MCNC: 2.7 MG/DL
PHOSPHATE SERPL-MCNC: 2.8 MG/DL
PHOSPHATE SERPL-MCNC: 2.9 MG/DL
PHOSPHATE SERPL-MCNC: 3.7 MG/DL
PISA TR MAX VEL: 3.05 M/S
PLATELET # BLD AUTO: 102 K/UL
PLATELET # BLD AUTO: 105 K/UL
PLATELET # BLD AUTO: 121 K/UL (ref 150–350)
PLATELET # BLD AUTO: 171 K/UL (ref 150–350)
PLATELET # BLD AUTO: 83 K/UL
PLATELET # BLD AUTO: 83 K/UL
PLATELET # BLD AUTO: 86 K/UL
PLATELET # BLD AUTO: 88 K/UL
PLATELET # BLD AUTO: 90 K/UL
PLATELET # BLD AUTO: 91 K/UL
PLATELET # BLD AUTO: 97 K/UL
PLATELET # BLD AUTO: 99 K/UL
PLATELET BLD QL SMEAR: ABNORMAL
PMV BLD AUTO: 10.4 FL (ref 9.2–12.9)
PMV BLD AUTO: 10.8 FL
PMV BLD AUTO: 11.2 FL
PMV BLD AUTO: 11.3 FL
PMV BLD AUTO: 11.7 FL (ref 9.2–12.9)
PMV BLD AUTO: 11.8 FL
PMV BLD AUTO: 11.9 FL
PMV BLD AUTO: ABNORMAL FL
PO2 BLDA: 228 MMHG (ref 80–100)
POC BE: -18 MMOL/L
POC IONIZED CALCIUM: 1.02 MMOL/L (ref 1.06–1.42)
POC SATURATED O2: 100 % (ref 95–100)
POC TCO2: 7 MMOL/L (ref 23–27)
POCT GLUCOSE: 101 MG/DL (ref 70–110)
POCT GLUCOSE: 110 MG/DL (ref 70–110)
POCT GLUCOSE: 111 MG/DL (ref 70–110)
POCT GLUCOSE: 112 MG/DL (ref 70–110)
POCT GLUCOSE: 115 MG/DL (ref 70–110)
POCT GLUCOSE: 116 MG/DL (ref 70–110)
POCT GLUCOSE: 118 MG/DL (ref 70–110)
POCT GLUCOSE: 119 MG/DL (ref 70–110)
POCT GLUCOSE: 125 MG/DL (ref 70–110)
POCT GLUCOSE: 126 MG/DL (ref 70–110)
POCT GLUCOSE: 133 MG/DL (ref 70–110)
POCT GLUCOSE: 136 MG/DL (ref 70–110)
POCT GLUCOSE: 137 MG/DL (ref 70–110)
POCT GLUCOSE: 137 MG/DL (ref 70–110)
POCT GLUCOSE: 138 MG/DL (ref 70–110)
POCT GLUCOSE: 139 MG/DL (ref 70–110)
POCT GLUCOSE: 147 MG/DL (ref 70–110)
POCT GLUCOSE: 148 MG/DL (ref 70–110)
POCT GLUCOSE: 149 MG/DL (ref 70–110)
POCT GLUCOSE: 152 MG/DL (ref 70–110)
POCT GLUCOSE: 155 MG/DL (ref 70–110)
POCT GLUCOSE: 156 MG/DL (ref 70–110)
POCT GLUCOSE: 163 MG/DL (ref 70–110)
POCT GLUCOSE: 164 MG/DL (ref 70–110)
POCT GLUCOSE: 165 MG/DL (ref 70–110)
POCT GLUCOSE: 171 MG/DL (ref 70–110)
POCT GLUCOSE: 175 MG/DL (ref 70–110)
POCT GLUCOSE: 194 MG/DL (ref 70–110)
POCT GLUCOSE: 196 MG/DL (ref 70–110)
POCT GLUCOSE: 246 MG/DL (ref 70–110)
POIKILOCYTOSIS BLD QL SMEAR: SLIGHT
POIKILOCYTOSIS BLD QL SMEAR: SLIGHT
POLYCHROMASIA BLD QL SMEAR: ABNORMAL
POLYCHROMASIA BLD QL SMEAR: ABNORMAL
POTASSIUM BLD-SCNC: 4.6 MMOL/L (ref 3.5–5.1)
POTASSIUM SERPL-SCNC: 3 MMOL/L
POTASSIUM SERPL-SCNC: 3 MMOL/L
POTASSIUM SERPL-SCNC: 3.1 MMOL/L
POTASSIUM SERPL-SCNC: 3.2 MMOL/L
POTASSIUM SERPL-SCNC: 3.5 MMOL/L
POTASSIUM SERPL-SCNC: 3.7 MMOL/L
POTASSIUM SERPL-SCNC: 3.9 MMOL/L
POTASSIUM SERPL-SCNC: 4.1 MMOL/L
POTASSIUM SERPL-SCNC: 4.3 MMOL/L
POTASSIUM SERPL-SCNC: 4.5 MMOL/L (ref 3.5–5.1)
POTASSIUM SERPL-SCNC: 4.9 MMOL/L (ref 3.5–5.1)
POTASSIUM SERPL-SCNC: 5.2 MMOL/L
PREALB SERPL-MCNC: 7 MG/DL
PROT SERPL-MCNC: 5.6 G/DL
PROT SERPL-MCNC: 5.7 G/DL
PROT SERPL-MCNC: 5.7 G/DL
PROT SERPL-MCNC: 5.7 G/DL (ref 6–8.4)
PROT SERPL-MCNC: 5.8 G/DL
PROT SERPL-MCNC: 5.8 G/DL
PROT SERPL-MCNC: 5.9 G/DL
PROT SERPL-MCNC: 6.1 G/DL
PROT SERPL-MCNC: 6.1 G/DL
PROT SERPL-MCNC: 6.3 G/DL
PROT SERPL-MCNC: 6.4 G/DL (ref 6–8.4)
PROT SERPL-MCNC: 6.6 G/DL
PROT UR QL STRIP: NEGATIVE
PROT UR-MCNC: <7 MG/DL
PROT/CREAT UR: ABNORMAL MG/G{CREAT}
PROTHROMBIN TIME: 14.6 SEC
PROTHROMBIN TIME: 15.1 SEC
PROTHROMBIN TIME: 15.5 SEC
PROTHROMBIN TIME: 15.5 SEC (ref 9–12.5)
PROTHROMBIN TIME: 15.8 SEC
PROTHROMBIN TIME: 15.9 SEC
PROTHROMBIN TIME: 16.3 SEC
PROTHROMBIN TIME: 16.3 SEC
PROTHROMBIN TIME: 16.6 SEC
PROTHROMBIN TIME: 16.8 SEC
PROTHROMBIN TIME: 17.2 SEC
PROTHROMBIN TIME: 20.9 SEC (ref 9–12.5)
RA MAJOR: 6.91 CM
RA PRESSURE: 3 MMHG
RA WIDTH: 4.43 CM
RBC # BLD AUTO: 3.68 M/UL
RBC # BLD AUTO: 3.82 M/UL
RBC # BLD AUTO: 3.83 M/UL
RBC # BLD AUTO: 3.83 M/UL
RBC # BLD AUTO: 3.83 M/UL (ref 4.6–6.2)
RBC # BLD AUTO: 3.86 M/UL
RBC # BLD AUTO: 3.92 M/UL
RBC # BLD AUTO: 3.95 M/UL
RBC # BLD AUTO: 4.02 M/UL
RBC # BLD AUTO: 4.03 M/UL
RBC # BLD AUTO: 4.04 M/UL
RBC # BLD AUTO: 4.81 M/UL (ref 4.6–6.2)
RIGHT VENTRICULAR END-DIASTOLIC DIMENSION: 4.4 CM
RV TISSUE DOPPLER FREE WALL SYSTOLIC VELOCITY 1 (APICAL 4 CHAMBER VIEW): 7.09 M/S
SAMPLE: ABNORMAL
SAMPLE: ABNORMAL
SINUS: 3.53 CM
SITE: ABNORMAL
SITE: ABNORMAL
SODIUM BLD-SCNC: 130 MMOL/L (ref 136–145)
SODIUM SERPL-SCNC: 134 MMOL/L
SODIUM SERPL-SCNC: 136 MMOL/L
SODIUM SERPL-SCNC: 136 MMOL/L (ref 136–145)
SODIUM SERPL-SCNC: 136 MMOL/L (ref 136–145)
SODIUM SERPL-SCNC: 139 MMOL/L
SODIUM SERPL-SCNC: 141 MMOL/L
SODIUM SERPL-SCNC: 142 MMOL/L
SODIUM UR-SCNC: <20 MMOL/L
SP GR UR STRIP: 1.02 (ref 1–1.03)
SP02: 90
SQUAMOUS #/AREA URNS AUTO: 1 /HPF
STJ: 3.07 CM
TARGETS BLD QL SMEAR: ABNORMAL
TDI LATERAL: 0.12
TDI SEPTAL: 0.08
TDI: 0.1
TR MAX PG: 37.21 MMHG
TRICUSPID ANNULAR PLANE SYSTOLIC EXCURSION: 1.13 CM
TV REST PULMONARY ARTERY PRESSURE: 40 MMHG
URN SPEC COLLECT METH UR: ABNORMAL
UUN UR-MCNC: 782 MG/DL
WBC # BLD AUTO: 10.72 K/UL (ref 3.9–12.7)
WBC # BLD AUTO: 23.92 K/UL (ref 3.9–12.7)
WBC # BLD AUTO: 5.39 K/UL
WBC # BLD AUTO: 5.51 K/UL
WBC # BLD AUTO: 6.17 K/UL
WBC # BLD AUTO: 6.21 K/UL
WBC # BLD AUTO: 6.55 K/UL
WBC # BLD AUTO: 6.58 K/UL
WBC # BLD AUTO: 6.94 K/UL
WBC # BLD AUTO: 7.23 K/UL
WBC # BLD AUTO: 8.42 K/UL
WBC # BLD AUTO: 8.56 K/UL
WBC # FLD: 194 /CU MM
WBC # FLD: 398 /CU MM
WBC # FLD: 69 /CU MM
WBC #/AREA URNS AUTO: 19 /HPF (ref 0–5)

## 2019-01-01 PROCEDURE — 63600175 PHARM REV CODE 636 W HCPCS: Mod: JG

## 2019-01-01 PROCEDURE — 99215 OFFICE O/P EST HI 40 MIN: CPT | Mod: PBBFAC | Performed by: PHYSICIAN ASSISTANT

## 2019-01-01 PROCEDURE — 83615 LACTATE (LD) (LDH) ENZYME: CPT

## 2019-01-01 PROCEDURE — 36415 COLL VENOUS BLD VENIPUNCTURE: CPT

## 2019-01-01 PROCEDURE — 80053 COMPREHEN METABOLIC PANEL: CPT

## 2019-01-01 PROCEDURE — 99214 PR OFFICE/OUTPT VISIT, EST, LEVL IV, 30-39 MIN: ICD-10-PCS | Mod: S$PBB,,, | Performed by: INTERNAL MEDICINE

## 2019-01-01 PROCEDURE — 84100 ASSAY OF PHOSPHORUS: CPT

## 2019-01-01 PROCEDURE — 99214 OFFICE O/P EST MOD 30 MIN: CPT | Mod: PBBFAC,25 | Performed by: INTERNAL MEDICINE

## 2019-01-01 PROCEDURE — 99232 SBSQ HOSP IP/OBS MODERATE 35: CPT | Mod: GC,,, | Performed by: INTERNAL MEDICINE

## 2019-01-01 PROCEDURE — 63600175 PHARM REV CODE 636 W HCPCS: Performed by: HOSPITALIST

## 2019-01-01 PROCEDURE — 99212 PR OFFICE/OUTPT VISIT, EST, LEVL II, 10-19 MIN: ICD-10-PCS | Mod: S$PBB,,, | Performed by: RADIOLOGY

## 2019-01-01 PROCEDURE — 85610 PROTHROMBIN TIME: CPT

## 2019-01-01 PROCEDURE — 85025 COMPLETE CBC W/AUTO DIFF WBC: CPT

## 2019-01-01 PROCEDURE — 25000003 PHARM REV CODE 250: Performed by: HOSPITALIST

## 2019-01-01 PROCEDURE — 99999 PR PBB SHADOW E&M-EST. PATIENT-LVL IV: CPT | Mod: PBBFAC,,, | Performed by: PHYSICIAN ASSISTANT

## 2019-01-01 PROCEDURE — 82105 ALPHA-FETOPROTEIN SERUM: CPT

## 2019-01-01 PROCEDURE — 99214 OFFICE O/P EST MOD 30 MIN: CPT | Mod: S$PBB,,, | Performed by: INTERNAL MEDICINE

## 2019-01-01 PROCEDURE — A9552 F18 FDG: HCPCS

## 2019-01-01 PROCEDURE — 11000001 HC ACUTE MED/SURG PRIVATE ROOM

## 2019-01-01 PROCEDURE — 49083 IR PARACENTESIS WITH IMAGING: ICD-10-PCS | Mod: ,,, | Performed by: RADIOLOGY

## 2019-01-01 PROCEDURE — 84134 ASSAY OF PREALBUMIN: CPT

## 2019-01-01 PROCEDURE — 99233 PR SUBSEQUENT HOSPITAL CARE,LEVL III: ICD-10-PCS | Mod: ,,, | Performed by: HOSPITALIST

## 2019-01-01 PROCEDURE — 36600 WITHDRAWAL OF ARTERIAL BLOOD: CPT

## 2019-01-01 PROCEDURE — 20600001 HC STEP DOWN PRIVATE ROOM

## 2019-01-01 PROCEDURE — 87088 URINE BACTERIA CULTURE: CPT

## 2019-01-01 PROCEDURE — 84540 ASSAY OF URINE/UREA-N: CPT

## 2019-01-01 PROCEDURE — 83735 ASSAY OF MAGNESIUM: CPT

## 2019-01-01 PROCEDURE — 99999 PR PBB SHADOW E&M-EST. PATIENT-LVL V: ICD-10-PCS | Mod: PBBFAC,,, | Performed by: NURSE PRACTITIONER

## 2019-01-01 PROCEDURE — 99999 PR PBB SHADOW E&M-EST. PATIENT-LVL IV: ICD-10-PCS | Mod: PBBFAC,,, | Performed by: PHYSICIAN ASSISTANT

## 2019-01-01 PROCEDURE — 99232 PR SUBSEQUENT HOSPITAL CARE,LEVL II: ICD-10-PCS | Mod: GC,,, | Performed by: INTERNAL MEDICINE

## 2019-01-01 PROCEDURE — P9047 ALBUMIN (HUMAN), 25%, 50ML: HCPCS | Mod: JG | Performed by: HOSPITALIST

## 2019-01-01 PROCEDURE — 99214 OFFICE O/P EST MOD 30 MIN: CPT | Mod: S$PBB,,, | Performed by: NURSE PRACTITIONER

## 2019-01-01 PROCEDURE — 99214 OFFICE O/P EST MOD 30 MIN: CPT | Mod: S$PBB,,, | Performed by: PHYSICIAN ASSISTANT

## 2019-01-01 PROCEDURE — 25000003 PHARM REV CODE 250

## 2019-01-01 PROCEDURE — 89051 BODY FLUID CELL COUNT: CPT

## 2019-01-01 PROCEDURE — 74177 CT ABD & PELVIS W/CONTRAST: CPT | Mod: 26,,, | Performed by: RADIOLOGY

## 2019-01-01 PROCEDURE — 99223 1ST HOSP IP/OBS HIGH 75: CPT | Mod: AI,GC,, | Performed by: HOSPITALIST

## 2019-01-01 PROCEDURE — 85384 FIBRINOGEN ACTIVITY: CPT

## 2019-01-01 PROCEDURE — 99999 PR PBB SHADOW E&M-EST. PATIENT-LVL V: ICD-10-PCS | Mod: PBBFAC,,, | Performed by: PHYSICIAN ASSISTANT

## 2019-01-01 PROCEDURE — 93010 ELECTROCARDIOGRAM REPORT: CPT | Mod: ,,, | Performed by: INTERNAL MEDICINE

## 2019-01-01 PROCEDURE — 85730 THROMBOPLASTIN TIME PARTIAL: CPT

## 2019-01-01 PROCEDURE — 99999 PR PBB SHADOW E&M-EST. PATIENT-LVL IV: ICD-10-PCS | Mod: PBBFAC,,, | Performed by: INTERNAL MEDICINE

## 2019-01-01 PROCEDURE — 99214 OFFICE O/P EST MOD 30 MIN: CPT | Mod: PBBFAC,25,27 | Performed by: PHYSICIAN ASSISTANT

## 2019-01-01 PROCEDURE — 99214 PR OFFICE/OUTPT VISIT, EST, LEVL IV, 30-39 MIN: ICD-10-PCS | Mod: S$PBB,,, | Performed by: PHYSICIAN ASSISTANT

## 2019-01-01 PROCEDURE — 99232 SBSQ HOSP IP/OBS MODERATE 35: CPT | Mod: ,,, | Performed by: HOSPITALIST

## 2019-01-01 PROCEDURE — 99223 1ST HOSP IP/OBS HIGH 75: CPT | Mod: GC,,, | Performed by: INTERNAL MEDICINE

## 2019-01-01 PROCEDURE — P9047 ALBUMIN (HUMAN), 25%, 50ML: HCPCS | Mod: JG

## 2019-01-01 PROCEDURE — 84156 ASSAY OF PROTEIN URINE: CPT

## 2019-01-01 PROCEDURE — 82570 ASSAY OF URINE CREATININE: CPT

## 2019-01-01 PROCEDURE — 63600175 PHARM REV CODE 636 W HCPCS: Mod: JG | Performed by: NURSE PRACTITIONER

## 2019-01-01 PROCEDURE — 99233 SBSQ HOSP IP/OBS HIGH 50: CPT | Mod: ,,, | Performed by: HOSPITALIST

## 2019-01-01 PROCEDURE — 84132 ASSAY OF SERUM POTASSIUM: CPT

## 2019-01-01 PROCEDURE — 25500020 PHARM REV CODE 255: Performed by: INTERNAL MEDICINE

## 2019-01-01 PROCEDURE — 71260 CT CHEST ABDOMEN PELVIS WITH CONTRAST (XPD): ICD-10-PCS | Mod: 26,,, | Performed by: RADIOLOGY

## 2019-01-01 PROCEDURE — 93005 ELECTROCARDIOGRAM TRACING: CPT

## 2019-01-01 PROCEDURE — 99215 OFFICE O/P EST HI 40 MIN: CPT | Mod: PBBFAC | Performed by: NURSE PRACTITIONER

## 2019-01-01 PROCEDURE — 49083 ABD PARACENTESIS W/IMAGING: CPT

## 2019-01-01 PROCEDURE — 93010 EKG 12-LEAD: ICD-10-PCS | Mod: ,,, | Performed by: INTERNAL MEDICINE

## 2019-01-01 PROCEDURE — 74183 MRI ABDOMEN W WO CONTRAST: ICD-10-PCS | Mod: 26,,, | Performed by: RADIOLOGY

## 2019-01-01 PROCEDURE — 99999 PR PBB SHADOW E&M-EST. PATIENT-LVL IV: CPT | Mod: PBBFAC,,, | Performed by: RADIOLOGY

## 2019-01-01 PROCEDURE — 25500020 PHARM REV CODE 255: Performed by: NURSE PRACTITIONER

## 2019-01-01 PROCEDURE — 99999 PR PBB SHADOW E&M-EST. PATIENT-LVL IV: ICD-10-PCS | Mod: PBBFAC,,, | Performed by: RADIOLOGY

## 2019-01-01 PROCEDURE — 99999 PR PBB SHADOW E&M-EST. PATIENT-LVL IV: CPT | Mod: PBBFAC,,, | Performed by: INTERNAL MEDICINE

## 2019-01-01 PROCEDURE — 87086 URINE CULTURE/COLONY COUNT: CPT

## 2019-01-01 PROCEDURE — 99239 PR HOSPITAL DISCHARGE DAY,>30 MIN: ICD-10-PCS | Mod: ,,, | Performed by: HOSPITALIST

## 2019-01-01 PROCEDURE — 70491 CT SOFT TISSUE NECK W/DYE: CPT | Mod: 26,,, | Performed by: RADIOLOGY

## 2019-01-01 PROCEDURE — 99900035 HC TECH TIME PER 15 MIN (STAT)

## 2019-01-01 PROCEDURE — 99291 PR CRITICAL CARE, E/M 30-74 MINUTES: ICD-10-PCS | Mod: ,,, | Performed by: NURSE PRACTITIONER

## 2019-01-01 PROCEDURE — 99232 PR SUBSEQUENT HOSPITAL CARE,LEVL II: ICD-10-PCS | Mod: ,,, | Performed by: HOSPITALIST

## 2019-01-01 PROCEDURE — 99999 PR PBB SHADOW E&M-EST. PATIENT-LVL V: CPT | Mod: PBBFAC,,, | Performed by: PHYSICIAN ASSISTANT

## 2019-01-01 PROCEDURE — 84300 ASSAY OF URINE SODIUM: CPT

## 2019-01-01 PROCEDURE — 70491 CT SOFT TISSUE NECK W/DYE: CPT | Mod: TC

## 2019-01-01 PROCEDURE — 78815 PET IMAGE W/CT SKULL-THIGH: CPT | Mod: 26,PS,, | Performed by: RADIOLOGY

## 2019-01-01 PROCEDURE — 82803 BLOOD GASES ANY COMBINATION: CPT

## 2019-01-01 PROCEDURE — 99214 PR OFFICE/OUTPT VISIT, EST, LEVL IV, 30-39 MIN: ICD-10-PCS | Mod: S$PBB,,, | Performed by: NURSE PRACTITIONER

## 2019-01-01 PROCEDURE — 81001 URINALYSIS AUTO W/SCOPE: CPT

## 2019-01-01 PROCEDURE — 85014 HEMATOCRIT: CPT

## 2019-01-01 PROCEDURE — 99999 PR PBB SHADOW E&M-EST. PATIENT-LVL V: CPT | Mod: PBBFAC,,, | Performed by: NURSE PRACTITIONER

## 2019-01-01 PROCEDURE — P9047 ALBUMIN (HUMAN), 25%, 50ML: HCPCS | Mod: JG | Performed by: NURSE PRACTITIONER

## 2019-01-01 PROCEDURE — 71260 CT THORAX DX C+: CPT | Mod: 26,,, | Performed by: RADIOLOGY

## 2019-01-01 PROCEDURE — 84295 ASSAY OF SERUM SODIUM: CPT

## 2019-01-01 PROCEDURE — 99233 PR SUBSEQUENT HOSPITAL CARE,LEVL III: ICD-10-PCS | Mod: GC,,, | Performed by: INTERNAL MEDICINE

## 2019-01-01 PROCEDURE — 78815 PET IMAGE W/CT SKULL-THIGH: CPT | Mod: TC

## 2019-01-01 PROCEDURE — 87186 SC STD MICRODIL/AGAR DIL: CPT

## 2019-01-01 PROCEDURE — 99233 SBSQ HOSP IP/OBS HIGH 50: CPT | Mod: GC,,, | Performed by: INTERNAL MEDICINE

## 2019-01-01 PROCEDURE — 78815 NM PET CT ROUTINE: ICD-10-PCS | Mod: 26,PS,, | Performed by: RADIOLOGY

## 2019-01-01 PROCEDURE — 97161 PT EVAL LOW COMPLEX 20 MIN: CPT

## 2019-01-01 PROCEDURE — 74177 CT CHEST ABDOMEN PELVIS WITH CONTRAST (XPD): ICD-10-PCS | Mod: 26,,, | Performed by: RADIOLOGY

## 2019-01-01 PROCEDURE — 74183 MRI ABD W/O CNTR FLWD CNTR: CPT | Mod: TC

## 2019-01-01 PROCEDURE — A9585 GADOBUTROL INJECTION: HCPCS | Performed by: NURSE PRACTITIONER

## 2019-01-01 PROCEDURE — 63600175 PHARM REV CODE 636 W HCPCS

## 2019-01-01 PROCEDURE — 63600175 PHARM REV CODE 636 W HCPCS: Performed by: STUDENT IN AN ORGANIZED HEALTH CARE EDUCATION/TRAINING PROGRAM

## 2019-01-01 PROCEDURE — 87070 CULTURE OTHR SPECIMN AEROBIC: CPT

## 2019-01-01 PROCEDURE — 87075 CULTR BACTERIA EXCEPT BLOOD: CPT

## 2019-01-01 PROCEDURE — 99214 OFFICE O/P EST MOD 30 MIN: CPT | Mod: PBBFAC | Performed by: RADIOLOGY

## 2019-01-01 PROCEDURE — 85027 COMPLETE CBC AUTOMATED: CPT

## 2019-01-01 PROCEDURE — 99291 CRITICAL CARE FIRST HOUR: CPT | Mod: ,,, | Performed by: NURSE PRACTITIONER

## 2019-01-01 PROCEDURE — 82330 ASSAY OF CALCIUM: CPT

## 2019-01-01 PROCEDURE — 70491 CT SOFT TISSUE NECK WITH CONTRAST: ICD-10-PCS | Mod: 26,,, | Performed by: RADIOLOGY

## 2019-01-01 PROCEDURE — 99239 HOSP IP/OBS DSCHRG MGMT >30: CPT | Mod: ,,, | Performed by: HOSPITALIST

## 2019-01-01 PROCEDURE — 87077 CULTURE AEROBIC IDENTIFY: CPT

## 2019-01-01 PROCEDURE — 49083 ABD PARACENTESIS W/IMAGING: CPT | Mod: ,,, | Performed by: RADIOLOGY

## 2019-01-01 PROCEDURE — 99223 PR INITIAL HOSPITAL CARE,LEVL III: ICD-10-PCS | Mod: AI,GC,, | Performed by: HOSPITALIST

## 2019-01-01 PROCEDURE — 99223 PR INITIAL HOSPITAL CARE,LEVL III: ICD-10-PCS | Mod: GC,,, | Performed by: INTERNAL MEDICINE

## 2019-01-01 PROCEDURE — P9047 ALBUMIN (HUMAN), 25%, 50ML: HCPCS | Mod: JG | Performed by: STUDENT IN AN ORGANIZED HEALTH CARE EDUCATION/TRAINING PROGRAM

## 2019-01-01 PROCEDURE — 99212 OFFICE O/P EST SF 10 MIN: CPT | Mod: S$PBB,,, | Performed by: RADIOLOGY

## 2019-01-01 PROCEDURE — 25000003 PHARM REV CODE 250: Performed by: STUDENT IN AN ORGANIZED HEALTH CARE EDUCATION/TRAINING PROGRAM

## 2019-01-01 PROCEDURE — 74183 MRI ABD W/O CNTR FLWD CNTR: CPT | Mod: 26,,, | Performed by: RADIOLOGY

## 2019-01-01 PROCEDURE — 97165 OT EVAL LOW COMPLEX 30 MIN: CPT

## 2019-01-01 PROCEDURE — 63600175 PHARM REV CODE 636 W HCPCS: Performed by: NURSE PRACTITIONER

## 2019-01-01 PROCEDURE — 83010 ASSAY OF HAPTOGLOBIN QUANT: CPT

## 2019-01-01 PROCEDURE — 83036 HEMOGLOBIN GLYCOSYLATED A1C: CPT

## 2019-01-01 PROCEDURE — 83605 ASSAY OF LACTIC ACID: CPT

## 2019-01-01 PROCEDURE — 74177 CT ABD & PELVIS W/CONTRAST: CPT | Mod: TC

## 2019-01-01 RX ORDER — SPIRONOLACTONE 50 MG/1
50 TABLET, FILM COATED ORAL DAILY
Qty: 30 TABLET | Refills: 11 | Status: ON HOLD | OUTPATIENT
Start: 2019-01-01 | End: 2019-01-01

## 2019-01-01 RX ORDER — METOPROLOL SUCCINATE 25 MG/1
25 TABLET, EXTENDED RELEASE ORAL 2 TIMES DAILY
Status: DISCONTINUED | OUTPATIENT
Start: 2019-01-01 | End: 2019-01-01

## 2019-01-01 RX ORDER — ALBUMIN HUMAN 250 G/1000ML
25 SOLUTION INTRAVENOUS ONCE
Status: COMPLETED | OUTPATIENT
Start: 2019-01-01 | End: 2019-01-01

## 2019-01-01 RX ORDER — METOLAZONE 2.5 MG/1
5 TABLET ORAL DAILY
Status: DISCONTINUED | OUTPATIENT
Start: 2019-01-01 | End: 2019-01-01

## 2019-01-01 RX ORDER — NALOXONE HCL 0.4 MG/ML
0.4 VIAL (ML) INJECTION
Status: DISCONTINUED | OUTPATIENT
Start: 2019-01-01 | End: 2019-01-01

## 2019-01-01 RX ORDER — METOPROLOL TARTRATE 25 MG/1
25 TABLET, FILM COATED ORAL 2 TIMES DAILY
Status: DISCONTINUED | OUTPATIENT
Start: 2019-01-01 | End: 2019-01-01

## 2019-01-01 RX ORDER — VERAPAMIL HYDROCHLORIDE 120 MG/1
240 TABLET, FILM COATED, EXTENDED RELEASE ORAL NIGHTLY
Status: DISCONTINUED | OUTPATIENT
Start: 2019-01-01 | End: 2019-01-01

## 2019-01-01 RX ORDER — PANTOPRAZOLE SODIUM 40 MG/1
40 TABLET, DELAYED RELEASE ORAL DAILY
Status: DISCONTINUED | OUTPATIENT
Start: 2019-01-01 | End: 2019-01-01

## 2019-01-01 RX ORDER — METOPROLOL TARTRATE 25 MG/1
25 TABLET, FILM COATED ORAL 3 TIMES DAILY
Status: DISCONTINUED | OUTPATIENT
Start: 2019-01-01 | End: 2019-01-01

## 2019-01-01 RX ORDER — MAGNESIUM SULFATE HEPTAHYDRATE 40 MG/ML
2 INJECTION, SOLUTION INTRAVENOUS ONCE
Status: COMPLETED | OUTPATIENT
Start: 2019-01-01 | End: 2019-01-01

## 2019-01-01 RX ORDER — FINASTERIDE 5 MG/1
5 TABLET, FILM COATED ORAL DAILY
Status: DISCONTINUED | OUTPATIENT
Start: 2019-01-01 | End: 2019-01-01 | Stop reason: HOSPADM

## 2019-01-01 RX ORDER — POTASSIUM CHLORIDE 20 MEQ/1
40 TABLET, EXTENDED RELEASE ORAL
Status: COMPLETED | OUTPATIENT
Start: 2019-01-01 | End: 2019-01-01

## 2019-01-01 RX ORDER — MIDODRINE HYDROCHLORIDE 5 MG/1
5 TABLET ORAL 3 TIMES DAILY
Status: DISCONTINUED | OUTPATIENT
Start: 2019-01-01 | End: 2019-01-01

## 2019-01-01 RX ORDER — SPIRONOLACTONE 25 MG/1
25 TABLET ORAL DAILY
Refills: 6 | COMMUNITY
Start: 2019-01-01

## 2019-01-01 RX ORDER — LORAZEPAM 2 MG/ML
1 INJECTION INTRAMUSCULAR EVERY 30 MIN PRN
Status: DISCONTINUED | OUTPATIENT
Start: 2019-01-01 | End: 2019-01-01 | Stop reason: HOSPADM

## 2019-01-01 RX ORDER — ALBUMIN HUMAN 250 G/1000ML
50 SOLUTION INTRAVENOUS EVERY 8 HOURS
Status: COMPLETED | OUTPATIENT
Start: 2019-01-01 | End: 2019-01-01

## 2019-01-01 RX ORDER — MORPHINE SULFATE 2 MG/ML
2 INJECTION, SOLUTION INTRAMUSCULAR; INTRAVENOUS
Status: DISCONTINUED | OUTPATIENT
Start: 2019-01-01 | End: 2019-01-01

## 2019-01-01 RX ORDER — AMOXICILLIN AND CLAVULANATE POTASSIUM 500; 125 MG/1; MG/1
1 TABLET, FILM COATED ORAL 2 TIMES DAILY
Status: DISCONTINUED | OUTPATIENT
Start: 2019-01-01 | End: 2019-01-01 | Stop reason: ALTCHOICE

## 2019-01-01 RX ORDER — POTASSIUM CHLORIDE 20 MEQ/1
40 TABLET, EXTENDED RELEASE ORAL ONCE
Status: COMPLETED | OUTPATIENT
Start: 2019-01-01 | End: 2019-01-01

## 2019-01-01 RX ORDER — VERAPAMIL HYDROCHLORIDE 180 MG/1
180 TABLET, FILM COATED, EXTENDED RELEASE ORAL NIGHTLY
Qty: 30 TABLET | Refills: 2 | Status: SHIPPED | OUTPATIENT
Start: 2019-01-01 | End: 2020-01-11

## 2019-01-01 RX ORDER — IBUPROFEN 200 MG
16 TABLET ORAL
Status: DISCONTINUED | OUTPATIENT
Start: 2019-01-01 | End: 2019-01-01

## 2019-01-01 RX ORDER — TAMSULOSIN HYDROCHLORIDE 0.4 MG/1
0.4 CAPSULE ORAL DAILY
Status: DISCONTINUED | OUTPATIENT
Start: 2019-01-01 | End: 2019-01-01 | Stop reason: HOSPADM

## 2019-01-01 RX ORDER — LANOLIN ALCOHOL/MO/W.PET/CERES
1000 CREAM (GRAM) TOPICAL DAILY
Status: DISCONTINUED | OUTPATIENT
Start: 2019-01-01 | End: 2019-01-01 | Stop reason: HOSPADM

## 2019-01-01 RX ORDER — ALBUMIN HUMAN 250 G/1000ML
25 SOLUTION INTRAVENOUS
Status: DISCONTINUED | OUTPATIENT
Start: 2019-01-01 | End: 2019-01-01 | Stop reason: HOSPADM

## 2019-01-01 RX ORDER — ALBUMIN HUMAN 250 G/1000ML
25 SOLUTION INTRAVENOUS 2 TIMES DAILY
Status: COMPLETED | OUTPATIENT
Start: 2019-01-01 | End: 2019-01-01

## 2019-01-01 RX ORDER — GLYCOPYRROLATE 0.2 MG/ML
0.1 INJECTION INTRAMUSCULAR; INTRAVENOUS EVERY 4 HOURS PRN
Status: DISCONTINUED | OUTPATIENT
Start: 2019-01-01 | End: 2019-01-01 | Stop reason: HOSPADM

## 2019-01-01 RX ORDER — MORPHINE SULFATE/0.9% NACL/PF 1 MG/ML
2 PLASTIC BAG, INJECTION (ML) INTRAVENOUS CONTINUOUS
Status: DISCONTINUED | OUTPATIENT
Start: 2019-01-01 | End: 2019-01-01 | Stop reason: HOSPADM

## 2019-01-01 RX ORDER — AMOXICILLIN 500 MG/1
500 CAPSULE ORAL EVERY 12 HOURS
Status: DISCONTINUED | OUTPATIENT
Start: 2019-01-01 | End: 2019-01-01 | Stop reason: HOSPADM

## 2019-01-01 RX ORDER — FUROSEMIDE 10 MG/ML
INJECTION INTRAMUSCULAR; INTRAVENOUS
Status: COMPLETED
Start: 2019-01-01 | End: 2019-01-01

## 2019-01-01 RX ORDER — METOPROLOL SUCCINATE 25 MG/1
25 TABLET, EXTENDED RELEASE ORAL DAILY
Status: DISCONTINUED | OUTPATIENT
Start: 2019-01-01 | End: 2019-01-01 | Stop reason: HOSPADM

## 2019-01-01 RX ORDER — VERAPAMIL HYDROCHLORIDE 240 MG/1
240 TABLET, FILM COATED, EXTENDED RELEASE ORAL NIGHTLY
Status: DISCONTINUED | OUTPATIENT
Start: 2019-01-01 | End: 2019-01-01

## 2019-01-01 RX ORDER — LACTULOSE 10 G/15ML
15 SOLUTION ORAL EVERY 6 HOURS PRN
Qty: 675 ML | Refills: 2 | Status: SHIPPED | OUTPATIENT
Start: 2019-01-01 | End: 2019-01-01

## 2019-01-01 RX ORDER — METOPROLOL SUCCINATE 25 MG/1
25 TABLET, EXTENDED RELEASE ORAL DAILY
Start: 2019-01-01

## 2019-01-01 RX ORDER — IRON POLYSACCHARIDE COMPLEX 150 MG
150 CAPSULE ORAL 2 TIMES DAILY
Status: DISCONTINUED | OUTPATIENT
Start: 2019-01-01 | End: 2019-01-01 | Stop reason: HOSPADM

## 2019-01-01 RX ORDER — GLUCAGON 1 MG
1 KIT INJECTION
Status: DISCONTINUED | OUTPATIENT
Start: 2019-01-01 | End: 2019-01-01

## 2019-01-01 RX ORDER — LANOLIN ALCOHOL/MO/W.PET/CERES
100 CREAM (GRAM) TOPICAL DAILY
COMMUNITY

## 2019-01-01 RX ORDER — INSULIN ASPART 100 [IU]/ML
0-5 INJECTION, SOLUTION INTRAVENOUS; SUBCUTANEOUS
Status: DISCONTINUED | OUTPATIENT
Start: 2019-01-01 | End: 2019-01-01

## 2019-01-01 RX ORDER — GADOBUTROL 604.72 MG/ML
10 INJECTION INTRAVENOUS
Status: COMPLETED | OUTPATIENT
Start: 2019-01-01 | End: 2019-01-01

## 2019-01-01 RX ORDER — SPIRONOLACTONE 100 MG/1
100 TABLET, FILM COATED ORAL DAILY
Status: DISCONTINUED | OUTPATIENT
Start: 2019-01-01 | End: 2019-01-01

## 2019-01-01 RX ORDER — ONDANSETRON 2 MG/ML
4 INJECTION INTRAMUSCULAR; INTRAVENOUS EVERY 6 HOURS PRN
Status: DISCONTINUED | OUTPATIENT
Start: 2019-01-01 | End: 2019-01-01 | Stop reason: HOSPADM

## 2019-01-01 RX ORDER — OXYCODONE HYDROCHLORIDE 5 MG/1
5 TABLET ORAL ONCE
Status: COMPLETED | OUTPATIENT
Start: 2019-01-01 | End: 2019-01-01

## 2019-01-01 RX ORDER — IBUPROFEN 200 MG
24 TABLET ORAL
Status: DISCONTINUED | OUTPATIENT
Start: 2019-01-01 | End: 2019-01-01 | Stop reason: HOSPADM

## 2019-01-01 RX ORDER — SPIRONOLACTONE 100 MG/1
200 TABLET, FILM COATED ORAL DAILY
Status: DISCONTINUED | OUTPATIENT
Start: 2019-01-01 | End: 2019-01-01

## 2019-01-01 RX ORDER — SODIUM CHLORIDE 0.9 % (FLUSH) 0.9 %
5 SYRINGE (ML) INJECTION
Status: DISCONTINUED | OUTPATIENT
Start: 2019-01-01 | End: 2019-01-01 | Stop reason: HOSPADM

## 2019-01-01 RX ORDER — LORAZEPAM/0.9% SODIUM CHLORIDE 100MG/0.1L
2 PLASTIC BAG, INJECTION (ML) INTRAVENOUS ONCE
Status: COMPLETED | OUTPATIENT
Start: 2019-01-01 | End: 2019-01-01

## 2019-01-01 RX ORDER — FUROSEMIDE 10 MG/ML
80 INJECTION INTRAMUSCULAR; INTRAVENOUS 3 TIMES DAILY
Status: DISCONTINUED | OUTPATIENT
Start: 2019-01-01 | End: 2019-01-01

## 2019-01-01 RX ORDER — VERAPAMIL HYDROCHLORIDE 180 MG/1
180 TABLET, FILM COATED, EXTENDED RELEASE ORAL NIGHTLY
Status: DISCONTINUED | OUTPATIENT
Start: 2019-01-01 | End: 2019-01-01 | Stop reason: HOSPADM

## 2019-01-01 RX ORDER — NALOXONE HCL 0.4 MG/ML
0.4 VIAL (ML) INJECTION ONCE
Status: COMPLETED | OUTPATIENT
Start: 2019-01-01 | End: 2019-01-01

## 2019-01-01 RX ORDER — ACETAMINOPHEN 325 MG/1
650 TABLET ORAL EVERY 8 HOURS PRN
Status: DISCONTINUED | OUTPATIENT
Start: 2019-01-01 | End: 2019-01-01 | Stop reason: HOSPADM

## 2019-01-01 RX ORDER — SPIRONOLACTONE 100 MG/1
100 TABLET, FILM COATED ORAL DAILY
Status: DISCONTINUED | OUTPATIENT
Start: 2019-01-01 | End: 2019-01-01 | Stop reason: HOSPADM

## 2019-01-01 RX ORDER — ALBUMIN HUMAN 250 G/1000ML
1 SOLUTION INTRAVENOUS DAILY
Status: DISCONTINUED | OUTPATIENT
Start: 2019-01-01 | End: 2019-01-01

## 2019-01-01 RX ORDER — MORPHINE SULFATE 2 MG/ML
2 INJECTION, SOLUTION INTRAMUSCULAR; INTRAVENOUS
Status: DISCONTINUED | OUTPATIENT
Start: 2019-01-01 | End: 2019-01-01 | Stop reason: HOSPADM

## 2019-01-01 RX ORDER — AMOXICILLIN AND CLAVULANATE POTASSIUM 500; 125 MG/1; MG/1
1 TABLET, FILM COATED ORAL 3 TIMES DAILY
Status: DISCONTINUED | OUTPATIENT
Start: 2019-01-01 | End: 2019-01-01

## 2019-01-01 RX ORDER — SPIRONOLACTONE 100 MG/1
100 TABLET, FILM COATED ORAL DAILY
Qty: 30 TABLET | Refills: 2 | Status: SHIPPED | OUTPATIENT
Start: 2019-01-01 | End: 2019-01-01

## 2019-01-01 RX ORDER — ONDANSETRON 8 MG/1
8 TABLET, ORALLY DISINTEGRATING ORAL EVERY 8 HOURS PRN
Status: DISCONTINUED | OUTPATIENT
Start: 2019-01-01 | End: 2019-01-01 | Stop reason: HOSPADM

## 2019-01-01 RX ORDER — SODIUM CHLORIDE 0.9 % (FLUSH) 0.9 %
10 SYRINGE (ML) INJECTION
Status: DISCONTINUED | OUTPATIENT
Start: 2019-01-01 | End: 2019-01-01

## 2019-01-01 RX ORDER — FUROSEMIDE 40 MG/1
40 TABLET ORAL DAILY
Status: DISCONTINUED | OUTPATIENT
Start: 2019-01-01 | End: 2019-01-01 | Stop reason: HOSPADM

## 2019-01-01 RX ORDER — IBUPROFEN 200 MG
16 TABLET ORAL
Status: DISCONTINUED | OUTPATIENT
Start: 2019-01-01 | End: 2019-01-01 | Stop reason: HOSPADM

## 2019-01-01 RX ORDER — PRAVASTATIN SODIUM 10 MG/1
10 TABLET ORAL DAILY
Status: DISCONTINUED | OUTPATIENT
Start: 2019-01-01 | End: 2019-01-01 | Stop reason: HOSPADM

## 2019-01-01 RX ORDER — OCTREOTIDE ACETATE 50 UG/ML
100 INJECTION, SOLUTION INTRAVENOUS; SUBCUTANEOUS EVERY 8 HOURS
Status: DISCONTINUED | OUTPATIENT
Start: 2019-01-01 | End: 2019-01-01

## 2019-01-01 RX ORDER — INSULIN ASPART 100 [IU]/ML
0-5 INJECTION, SOLUTION INTRAVENOUS; SUBCUTANEOUS
Status: DISCONTINUED | OUTPATIENT
Start: 2019-01-01 | End: 2019-01-01 | Stop reason: HOSPADM

## 2019-01-01 RX ORDER — GLUCAGON 1 MG
1 KIT INJECTION
Status: DISCONTINUED | OUTPATIENT
Start: 2019-01-01 | End: 2019-01-01 | Stop reason: HOSPADM

## 2019-01-01 RX ORDER — NALOXONE HCL 0.4 MG/ML
VIAL (ML) INJECTION
Status: COMPLETED
Start: 2019-01-01 | End: 2019-01-01

## 2019-01-01 RX ORDER — SPIRONOLACTONE 50 MG/1
50 TABLET, FILM COATED ORAL DAILY
Status: ON HOLD | COMMUNITY
End: 2019-01-01 | Stop reason: HOSPADM

## 2019-01-01 RX ORDER — CHOLECALCIFEROL (VITAMIN D3) 25 MCG
2000 TABLET ORAL DAILY
Status: DISCONTINUED | OUTPATIENT
Start: 2019-01-01 | End: 2019-01-01 | Stop reason: HOSPADM

## 2019-01-01 RX ORDER — IBUPROFEN 200 MG
24 TABLET ORAL
Status: DISCONTINUED | OUTPATIENT
Start: 2019-01-01 | End: 2019-01-01

## 2019-01-01 RX ADMIN — VITAMIN D, TAB 1000IU (100/BT) 2000 UNITS: 25 TAB at 08:01

## 2019-01-01 RX ADMIN — METOPROLOL TARTRATE 25 MG: 25 TABLET ORAL at 02:01

## 2019-01-01 RX ADMIN — MORPHINE SULFATE 2 MG: 2 INJECTION, SOLUTION INTRAMUSCULAR; INTRAVENOUS at 12:04

## 2019-01-01 RX ADMIN — PRAVASTATIN SODIUM 10 MG: 10 TABLET ORAL at 09:01

## 2019-01-01 RX ADMIN — SPIRONOLACTONE 100 MG: 100 TABLET ORAL at 10:01

## 2019-01-01 RX ADMIN — SPIRONOLACTONE 200 MG: 100 TABLET ORAL at 09:01

## 2019-01-01 RX ADMIN — POTASSIUM CHLORIDE 40 MEQ: 1500 TABLET, EXTENDED RELEASE ORAL at 10:01

## 2019-01-01 RX ADMIN — APIXABAN 5 MG: 5 TABLET, FILM COATED ORAL at 09:01

## 2019-01-01 RX ADMIN — VITAMIN D, TAB 1000IU (100/BT) 2000 UNITS: 25 TAB at 09:01

## 2019-01-01 RX ADMIN — FUROSEMIDE 80 MG: 10 INJECTION, SOLUTION INTRAVENOUS at 05:01

## 2019-01-01 RX ADMIN — CYANOCOBALAMIN TAB 1000 MCG 1000 MCG: 1000 TAB at 09:01

## 2019-01-01 RX ADMIN — TAMSULOSIN HYDROCHLORIDE 0.4 MG: 0.4 CAPSULE ORAL at 08:01

## 2019-01-01 RX ADMIN — FUROSEMIDE 15 MG/HR: 10 INJECTION, SOLUTION INTRAMUSCULAR; INTRAVENOUS at 02:01

## 2019-01-01 RX ADMIN — SPIRONOLACTONE 200 MG: 100 TABLET ORAL at 08:01

## 2019-01-01 RX ADMIN — MAGNESIUM SULFATE IN WATER 2 G: 40 INJECTION, SOLUTION INTRAVENOUS at 08:01

## 2019-01-01 RX ADMIN — ALBUMIN (HUMAN) 50 G: 12.5 SOLUTION INTRAVENOUS at 05:01

## 2019-01-01 RX ADMIN — GADOBUTROL 10 ML: 604.72 INJECTION INTRAVENOUS at 01:04

## 2019-01-01 RX ADMIN — ALBUMIN HUMAN 101 G: 0.25 SOLUTION INTRAVENOUS at 11:04

## 2019-01-01 RX ADMIN — ALBUMIN HUMAN 25 G: 0.25 SOLUTION INTRAVENOUS at 09:01

## 2019-01-01 RX ADMIN — FUROSEMIDE 80 MG: 10 INJECTION, SOLUTION INTRAMUSCULAR; INTRAVENOUS at 03:01

## 2019-01-01 RX ADMIN — FUROSEMIDE 40 MG: 40 TABLET ORAL at 10:01

## 2019-01-01 RX ADMIN — PRAVASTATIN SODIUM 10 MG: 10 TABLET ORAL at 10:01

## 2019-01-01 RX ADMIN — FUROSEMIDE 80 MG: 10 INJECTION, SOLUTION INTRAVENOUS at 10:01

## 2019-01-01 RX ADMIN — METOPROLOL TARTRATE 25 MG: 25 TABLET ORAL at 03:01

## 2019-01-01 RX ADMIN — AMOXICILLIN 500 MG: 500 CAPSULE ORAL at 10:01

## 2019-01-01 RX ADMIN — CYANOCOBALAMIN TAB 1000 MCG 1000 MCG: 1000 TAB at 08:01

## 2019-01-01 RX ADMIN — Medication 150 MG: at 08:01

## 2019-01-01 RX ADMIN — FUROSEMIDE 80 MG: 10 INJECTION, SOLUTION INTRAVENOUS at 09:01

## 2019-01-01 RX ADMIN — METOPROLOL SUCCINATE 25 MG: 25 TABLET, EXTENDED RELEASE ORAL at 10:01

## 2019-01-01 RX ADMIN — VERAPAMIL HYDROCHLORIDE 300 MG: 180 TABLET, FILM COATED, EXTENDED RELEASE ORAL at 10:01

## 2019-01-01 RX ADMIN — APIXABAN 5 MG: 5 TABLET, FILM COATED ORAL at 10:01

## 2019-01-01 RX ADMIN — METOPROLOL TARTRATE 25 MG: 25 TABLET ORAL at 09:01

## 2019-01-01 RX ADMIN — AMOXICILLIN 500 MG: 500 CAPSULE ORAL at 09:01

## 2019-01-01 RX ADMIN — FINASTERIDE 5 MG: 5 TABLET, FILM COATED ORAL at 09:01

## 2019-01-01 RX ADMIN — Medication 150 MG: at 09:01

## 2019-01-01 RX ADMIN — TAMSULOSIN HYDROCHLORIDE 0.4 MG: 0.4 CAPSULE ORAL at 09:01

## 2019-01-01 RX ADMIN — ALBUMIN (HUMAN) 50 G: 12.5 SOLUTION INTRAVENOUS at 02:01

## 2019-01-01 RX ADMIN — IOHEXOL 100 ML: 350 INJECTION, SOLUTION INTRAVENOUS at 10:02

## 2019-01-01 RX ADMIN — POTASSIUM BICARBONATE 50 MEQ: 25 TABLET, EFFERVESCENT ORAL at 09:01

## 2019-01-01 RX ADMIN — AMOXICILLIN AND CLAVULANATE POTASSIUM 500 MG: 500; 125 TABLET, FILM COATED ORAL at 09:01

## 2019-01-01 RX ADMIN — FUROSEMIDE 80 MG: 10 INJECTION, SOLUTION INTRAVENOUS at 11:01

## 2019-01-01 RX ADMIN — VERAPAMIL HYDROCHLORIDE 180 MG: 180 TABLET, FILM COATED, EXTENDED RELEASE ORAL at 08:01

## 2019-01-01 RX ADMIN — POTASSIUM BICARBONATE 50 MEQ: 25 TABLET, EFFERVESCENT ORAL at 02:01

## 2019-01-01 RX ADMIN — PRAVASTATIN SODIUM 10 MG: 10 TABLET ORAL at 08:01

## 2019-01-01 RX ADMIN — FINASTERIDE 5 MG: 5 TABLET, FILM COATED ORAL at 10:01

## 2019-01-01 RX ADMIN — CYANOCOBALAMIN TAB 1000 MCG 1000 MCG: 1000 TAB at 10:01

## 2019-01-01 RX ADMIN — Medication 150 MG: at 10:01

## 2019-01-01 RX ADMIN — FINASTERIDE 5 MG: 5 TABLET, FILM COATED ORAL at 08:01

## 2019-01-01 RX ADMIN — AMOXICILLIN 500 MG: 500 CAPSULE ORAL at 08:01

## 2019-01-01 RX ADMIN — VERAPAMIL HYDROCHLORIDE 300 MG: 180 TABLET, FILM COATED, EXTENDED RELEASE ORAL at 09:01

## 2019-01-01 RX ADMIN — METOPROLOL SUCCINATE 25 MG: 25 TABLET, EXTENDED RELEASE ORAL at 09:01

## 2019-01-01 RX ADMIN — SPIRONOLACTONE 200 MG: 100 TABLET ORAL at 05:01

## 2019-01-01 RX ADMIN — OXYCODONE HYDROCHLORIDE 5 MG: 5 TABLET ORAL at 08:04

## 2019-01-01 RX ADMIN — METOLAZONE 5 MG: 2.5 TABLET ORAL at 11:01

## 2019-01-01 RX ADMIN — MAGNESIUM SULFATE IN WATER 2 G: 40 INJECTION, SOLUTION INTRAVENOUS at 10:01

## 2019-01-01 RX ADMIN — MAGNESIUM SULFATE HEPTAHYDRATE 3 G: 500 INJECTION, SOLUTION INTRAMUSCULAR; INTRAVENOUS at 09:01

## 2019-01-01 RX ADMIN — TAMSULOSIN HYDROCHLORIDE 0.4 MG: 0.4 CAPSULE ORAL at 10:01

## 2019-01-01 RX ADMIN — VITAMIN D, TAB 1000IU (100/BT) 2000 UNITS: 25 TAB at 10:01

## 2019-01-01 RX ADMIN — POTASSIUM CHLORIDE 40 MEQ: 1500 TABLET, EXTENDED RELEASE ORAL at 01:01

## 2019-01-01 RX ADMIN — AMOXICILLIN AND CLAVULANATE POTASSIUM 500 MG: 500; 125 TABLET, FILM COATED ORAL at 02:01

## 2019-01-01 RX ADMIN — METOPROLOL TARTRATE 25 MG: 25 TABLET ORAL at 10:01

## 2019-01-01 RX ADMIN — ALBUMIN HUMAN 25 G: 0.25 SOLUTION INTRAVENOUS at 10:01

## 2019-01-01 RX ADMIN — SPIRONOLACTONE 100 MG: 100 TABLET ORAL at 09:01

## 2019-01-01 RX ADMIN — APIXABAN 5 MG: 5 TABLET, FILM COATED ORAL at 08:01

## 2019-01-01 RX ADMIN — NALOXONE HYDROCHLORIDE: 0.4 INJECTION, SOLUTION INTRAMUSCULAR; INTRAVENOUS; SUBCUTANEOUS at 10:04

## 2019-01-01 RX ADMIN — NALOXONE HYDROCHLORIDE 0.4 MG: 0.4 INJECTION, SOLUTION INTRAMUSCULAR; INTRAVENOUS; SUBCUTANEOUS at 10:04

## 2019-01-01 RX ADMIN — MIDODRINE HYDROCHLORIDE 5 MG: 5 TABLET ORAL at 09:04

## 2019-01-01 RX ADMIN — VERAPAMIL HYDROCHLORIDE 180 MG: 180 TABLET, FILM COATED, EXTENDED RELEASE ORAL at 09:01

## 2019-01-01 RX ADMIN — POTASSIUM BICARBONATE 50 MEQ: 25 TABLET, EFFERVESCENT ORAL at 11:01

## 2019-01-01 RX ADMIN — ALBUMIN (HUMAN) 50 G: 12.5 SOLUTION INTRAVENOUS at 10:01

## 2019-01-01 RX ADMIN — Medication 2 MG/HR: at 12:04

## 2019-01-01 RX ADMIN — FUROSEMIDE 15 MG/HR: 10 INJECTION, SOLUTION INTRAMUSCULAR; INTRAVENOUS at 05:01

## 2019-01-01 RX ADMIN — FUROSEMIDE 40 MG: 40 TABLET ORAL at 09:01

## 2019-01-01 RX ADMIN — FUROSEMIDE 80 MG: 10 INJECTION, SOLUTION INTRAVENOUS at 03:01

## 2019-01-01 RX ADMIN — FUROSEMIDE 15 MG/HR: 10 INJECTION, SOLUTION INTRAMUSCULAR; INTRAVENOUS at 11:01

## 2019-01-01 RX ADMIN — LORAZEPAM 1 MG: 2 INJECTION, SOLUTION INTRAMUSCULAR; INTRAVENOUS at 12:04

## 2019-01-01 RX ADMIN — METOPROLOL TARTRATE 25 MG: 25 TABLET ORAL at 08:01

## 2019-01-01 RX ADMIN — VERAPAMIL HYDROCHLORIDE 240 MG: 120 TABLET, FILM COATED, EXTENDED RELEASE ORAL at 10:01

## 2019-01-01 RX ADMIN — MAGNESIUM SULFATE IN WATER 2 G: 40 INJECTION, SOLUTION INTRAVENOUS at 11:01

## 2019-01-01 RX ADMIN — ALBUMIN (HUMAN) 25 G: 25 SOLUTION INTRAVENOUS at 02:04

## 2019-01-01 RX ADMIN — MAGNESIUM SULFATE IN WATER 2 G: 40 INJECTION, SOLUTION INTRAVENOUS at 09:01

## 2019-01-02 PROBLEM — R18.8 OTHER ASCITES: Status: ACTIVE | Noted: 2019-01-01

## 2019-01-02 PROBLEM — R60.0 LEG EDEMA: Status: ACTIVE | Noted: 2019-01-01

## 2019-01-02 PROBLEM — N40.1 BENIGN PROSTATIC HYPERPLASIA WITH URINARY HESITANCY: Status: ACTIVE | Noted: 2019-01-01

## 2019-01-02 PROBLEM — D50.9 IRON DEFICIENCY ANEMIA: Status: ACTIVE | Noted: 2019-01-01

## 2019-01-02 PROBLEM — R39.11 BENIGN PROSTATIC HYPERPLASIA WITH URINARY HESITANCY: Status: ACTIVE | Noted: 2019-01-01

## 2019-01-02 PROBLEM — N17.9 ACUTE KIDNEY INJURY: Status: ACTIVE | Noted: 2019-01-01

## 2019-01-02 PROBLEM — R60.1 ANASARCA: Status: ACTIVE | Noted: 2019-01-01

## 2019-01-02 PROBLEM — E55.9 VITAMIN D DEFICIENCY: Status: ACTIVE | Noted: 2019-01-01

## 2019-01-02 NOTE — PATIENT INSTRUCTIONS
- admit to hospital for the treatment of cirrhosis-related decompensations; volume overload, anasarca, EYMI

## 2019-01-02 NOTE — PLAN OF CARE
[facility] Direct Admission Acceptance Note    Please call extension [phone] (if nobody answers, this will flip to a beeper, so put in your call back number) upon patient arrival to floor for Hospital Medicine admit team assignment and for additional admit orders for the patient.  Do not page the attending, staff physician associate with the patient on arrival (may not be in-house at the time of arrival).  Rather, always call [phone] to reach the triage physician for orders and team assignment.        Referring Provider/Specialty giving report:  Dr. Estelle Dumont - hepatology      Accepting Physician for admission to hospital: Yogi Chappell MD    Date of acceptance:  1/2/2019    Patients name: Hossein Sanchez Sr.       Allergies:  Review of patient's allergies indicates:   Allergen Reactions    Altace [ramipril] Anaphylaxis    Adhesive tape-silicones Blisters        Reason for admission:  YEMI workup, diuresis     Overview/ Report from Physician/Mid-Level Provider:    HPI:  77 Y/O with hx of HCC, DRISCOLL Cirrhosis, Lung CA, not a liver txp candidate, local therapy for HCC with IR.  Takes lasix 40 mg po and  was well compensated, no encephalopathy.  Last month had some scans with IR that showed increased ascites and edema.  IR requested a more urgent hepatology eval, and patient seen today and noted with mild SOB and 2-3+ edema and moderate ascites,  Cr up to 1.7 (bl 0.9)  Admit to hydrate with Albumin, workup YEMI and then start diuretics for concerns of anasarca.       VS:  Temp:  [97.8 °F (36.6 °C)]   Pulse:  [83]   Resp:  [18]   BP: (128)/(70)   SpO2:  [99 %]       Labs:   MELD-Na score: 18 at 1/2/2019 12:00 PM  MELD score: 18 at 1/2/2019 12:00 PM  Calculated from:  Serum Creatinine: 1.7 mg/dL at 1/2/2019 12:00 PM  Serum Sodium: 140 mmol/L (Rounded to 137 mmol/L) at 1/2/2019 12:00 PM  Total Bilirubin: 1.5 mg/dL at 1/2/2019 12:00 PM  INR(ratio): 1.5 at 1/2/2019 12:00 PM  Age: 78 years    See Epic for rest          Radiographs:   Mri abdomen 12/21  Impression       Extensive post treatment change in the liver, noting areas of arterial portal shunting, delayed enhancement/scarring, and non enhancement.  Findings are similar to the 06/29/2018 exam.  No discrete evidence of residual tumor identified.    Moderate ascites, increased.    Additional stable findings as above.    Electronically signed by resident: Ab Tucker  Date: 12/21/2018  Time: 10:25         To Do List upon arrival:    1. YEMI workup with - Urine electrolytes, Cr, Na, Urea, Urinalysis  2. Start IV albumin infusion based on his weight of 123 kg, I recommend starting 50grams IV q8hrs of 25% albumin for initial 3 doses.   3. Hepatology consultation.   4. Will need IV lasix administration after renal function improving.   5. Hold nephrotoxic medications -               Yogi Chappell M.D.  Attending Physician  [facility] Medicine Dept.  Pager: [phone]

## 2019-01-02 NOTE — PROGRESS NOTES
Hepatology Note    Chief complaint:   Chief Complaint   Patient presents with    Hepatocellular Carcinoma    anasarca    Cirrhosis       HPI:  Hossein Sanchez Sr. is a pleasant 78 y.o. malewho was referred to Hepatology Clinic for consultation of had concerns including Hepatocellular Carcinoma; anasarca; and Cirrhosis..      Follow up  1/2/2019: MELD 18, YEMI - Cr clarisa to 1.7, pt c/o weight gain, edema in both legs, increasing abd girth and SOB on exertion. Volume overload with YEMI, will send patient to admission to IM-L for IV albumin for volume expansion, urine lytes and diuresis.    12/21/18: MRI surveillance - no residual HCC, moderate ascites. MRI reveiwed by IR - Dr. Lacy - > continue surveillance in 3 months March 2019    10/25/18: intermittent nausea, denies confusion, GI bleed, edema/ascies. Feeling relatively well. Was told lung cancer is coming back, will see Oncology. MELD: 9, compensated from liver standpoint.    HCC  12/21/18: MRI - no residual, repeat MRI in 3 months - March 2019 9/25/18: left lobe TARE - y-90  3/8/18: right lobe retreatment TARE - y-90  8/8/18: right lobeTARE - y-90    Lab Results   Component Value Date    AFP 16 (H) 10/25/2018       Liver biopsy in 2017 - cirrhosis, stage 4 fibrosis. Likely DRISCOLL.  Patient has no active complaint today.    MELD-Na score: 18 at 1/2/2019 12:00 PM  MELD score: 18 at 1/2/2019 12:00 PM  Calculated from:  Serum Creatinine: 1.7 mg/dL at 1/2/2019 12:00 PM  Serum Sodium: 140 mmol/L (Rounded to 137 mmol/L) at 1/2/2019 12:00 PM  Total Bilirubin: 1.5 mg/dL at 1/2/2019 12:00 PM  INR(ratio): 1.5 at 1/2/2019 12:00 PM  Age: 78 years    Liver disease:                   DRISCOLL     MELD-Na:                         18 (baselline 9)  Child-Hall Class:             B    Transplant status:             not a candidate due to , concomitant malignancy (lung) and HCC out of [place]    Cirrhosis is compensated with:    Ascites:                                                       Yes, noted 12/2018  Spontaneous bacterial peritonitis:              no  Hepatic Encephalopathy:                           no  Portal bleeding:                                          no  Hepatocellular carcinoma:                          no    Hepatorenal syndrome:                              no  Hyponatremia:                                            no  Muscle wasting:                                          yes  Portal vein thrombosis:                               no      Screening:  Last EGD: 10/2017 Dr. Dave ([facility]) - due in 2019    HCC history  Lab Results   Component Value Date    AFP 10 (H) 01/02/2019       3/23/18: Oncology - Dr. hAmadi - [Deja has a large right lobe HCC, and another right lobe lesion, with no obvious distant lesions on imaging. He has well compensated liver function. He is scheduled for Y90 radio embolization with IR on 3/29. He also has a right upper lobe lung mass, believed to be adenocarcinoma in situ. He was scheduled for SBRT for this lesion, but has been deferred, as the lesion size has not changed. He will follow here after Y90 therapy.]    2/12/18: MRI: 7.0 x 6.5 cm (previously 6.2 x 5.0 cm) heterogeneously enhancing mass in hepatic segment VIII    2/16/18:  Right upper lobe lung mass (biopsy)  Atypical alveolar epithelial hyperplasia bordering on adenocarcinoma    OUTSIDE SLIDE REVIEW  1. ORIGINAL DATE OF PROCEDURE: 11/21/2017  LIVER MASS [OSR # [id number]] (NEEDLE BIOPSY):  Hepatocellular carcinoma, well differentiated with clear cell features  Background cirrhosis (stage 4 of 4)    2. ORIGINAL DATE OF PROCEDURE 12/5/2017  LUNG MASS, RIGHT UPPER LOBE [OSR# [id number]]:  At least atypical adenomatous hyperplasia, 1.5 millimeters    4/25/16: LIVER (EXCISION):  -No neoplasm identified  -Thick fibrous cyst wall with cyst contents, macrophages, and degenerative material    4/8/16: LIVER MASS (NEEDLE BIOPSY WITH PATHOLOGIST ADEQUACY):  -No neoplasm identified  -Acellular  hyalinized scar and adjacent liver with hepatocyte dropout and inflammation    Patient Active Problem List   Diagnosis    Liver lesion    Hepatocellular carcinoma    Primary adenocarcinoma of upper lobe of right lung    Hepatic cirrhosis    HCC (hepatocellular carcinoma)    Chronic a-fib    Type 2 diabetes mellitus without complication, without long-term current use of insulin    Lesion of parotid gland       Past Medical History:   Diagnosis Date    Anemia     Cancer     Diabetes mellitus     Encounter for blood transfusion     GI bleed 8/6/2003    Hypertension     Liver carcinoma     Obstructive sleep apnea on CPAP     Sleep apnea 12/18/15    Stroke 2014    Stroke 7/16/14    TIA (transient ischemic attack) 2/11/15       Past Surgical History:   Procedure Laterality Date    APPENDECTOMY  teen    QUOTCR-HRMGAZ-AA N/A 4/8/2016    [facility]    BIOPSY-LIVER-LAPAROSCOPIC N/A 4/25/2016    [facility]    BONE MARROW BIOPSY      CATARACT EXTRACTION  12/10/11    CHOLECYSTECTOMY      CHOLECYSTECTOMY-LAPAROSCOPIC  4/25/2016    [facility]    COLONOSCOPY  12/21/15    EMBOLIZATION, ARTERY, LIVER, FOR SELECTIVE INTERNAL RADIATION THERAPY USING YTTRIUM-90 MICROSPHERES N/A 9/25/2018    Performed by [facility]    EMBOLIZATION, BLOOD VESSEL N/A 8/8/2018    Performed by [facility]    Embolization, Yttrium Therapy N/A 7/18/2018    Performed by [facility]    Embolization, Yttrium Therapy N/A 3/29/2018    Per[facility] by [facility]    Embolization, Yttrium Therapy N/A 3/8/2018    [facility]    JOINT REPLACEMENT Bilateral     knees    KNEE SURGERY Right replacement    KNEE SURGERY Left      TONSILLECTOMY         Family History   Problem Relation Age of Onset    Heart disease Mother     Diabetes Mother     Heart disease Father     Diabetes Father     No Known Problems Sister     Kidney disease Brother     No Known Problems Maternal Aunt     No Known Problems Maternal Uncle     No Known Problems Paternal Aunt     No Known Problems Paternal Uncle     No Known Problems Maternal Grandmother     No Known Problems Maternal Grandfather     Cancer Paternal Grandmother     No Known Problems Paternal Grandfather     Heart disease Brother     Esophageal cancer Brother     Lymphoma Brother     Stomach cancer Brother     Diabetes Daughter        Social History     Socioeconomic History    Marital status:      Spouse name: Not on file    Number of children: Not on file    Years of education: Not on file    Highest education level: Not on file   Social Needs    Financial resource strain: Not on file    Food insecurity - worry: Not on file    Food insecurity - inability: Not on file    Transportation needs - medical: Not on file    Transportation needs - non-medical: Not on file   Occupational History    Not on file   Tobacco Use    Smoking status: Never Smoker    Smokeless tobacco: Never Used   Substance and Sexual Activity    Alcohol use: No    Drug use: No    Sexual activity: Not Currently     Partners: Female     Birth control/protection: None   Other Topics Concern    Not on file   Social History Narrative    Not on file       Current Outpatient Medications   Medication Sig Dispense Refill    apixaban (ELIQUIS) 5 mg Tab Take 5 mg by mouth 2 (two) times daily.      aspirin 81 MG Chew Take 81 mg by mouth once daily.      calcium carbonate (OS-CAMILA) 600 mg (1,500 mg) Tab Take 600 mg by mouth 2 (two) times daily with meals.      finasteride (PROSCAR) 5 mg tablet       furosemide (LASIX) 80 MG tablet       iron polysaccharides (NIFEREX) 150 mg iron Cap Take 150 mg by  "mouth 2 (two) times daily.      metformin (GLUCOPHAGE) 500 MG tablet Take 1,000 mg by mouth 2 (two) times daily with meals.       metoprolol succinate (TOPROL-XL) 12.5 MG 24 hr split tablet Take 25 mg by mouth 2 (two) times daily.      MULTIVIT-IRON-MIN-FOLIC ACID 3,500-18-0.4 UNIT-MG-MG ORAL CHEW Take by mouth.      pantoprazole (PROTONIX) 40 MG tablet Take 40 mg by mouth once daily.      pravastatin (PRAVACHOL) 10 MG tablet Take 10 mg by mouth once daily.      tamsulosin (FLOMAX) 0.4 mg Cp24       verapamil (VERELAN PM) 300 mg 24 hr capsule       vitamin D 1000 units Tab Take 2,000 Units by mouth once daily.       No current facility-administered medications for this visit.        Review of patient's allergies indicates:   Allergen Reactions    Altace [ramipril] Anaphylaxis       Review of Systems   Constitutional: Positive for malaise/fatigue. Negative for chills, fever and weight loss.        Rapid weight gain   HENT: Negative for congestion, nosebleeds and sore throat.    Eyes: Negative for blurred vision, double vision and photophobia.   Respiratory: Positive for shortness of breath. Negative for cough.    Cardiovascular: Positive for leg swelling. Negative for chest pain, palpitations and orthopnea.   Gastrointestinal: Negative for abdominal pain, blood in stool, constipation, diarrhea, melena and vomiting.        Abdominal distention   Genitourinary: Negative for dysuria.   Musculoskeletal: Negative for falls and joint pain.   Skin: Negative for itching and rash.   Neurological: Negative for dizziness, tremors and weakness.   Endo/Heme/Allergies: Does not bruise/bleed easily.   Psychiatric/Behavioral: Negative for depression and substance abuse. The patient is not nervous/anxious and does not have insomnia.        Vitals:    01/02/19 1305   BP: 128/70   Pulse: 83   Resp: 18   Temp: 97.8 °F (36.6 °C)   TempSrc: Oral   SpO2: 99%   Weight: 123.1 kg (271 lb 6.2 oz)   Height: 5' 4" (1.626 m) "       Physical Exam   Constitutional: He is oriented to person, place, and time. He appears well-developed. No distress.   Chronically ill-appearing. Malnourished.      HENT:   Head: Normocephalic and atraumatic.   Mouth/Throat: Oropharynx is clear and moist.   Eyes: No scleral icterus.   Neck: Normal range of motion.   Cardiovascular: Normal rate, regular rhythm, normal heart sounds and intact distal pulses.   No murmur heard.  Pulmonary/Chest: Effort normal and breath sounds normal. No respiratory distress. He has no wheezes. He has no rales.   Decreased breath sounds in lung bases   Abdominal: Soft. Normal appearance and bowel sounds are normal. He exhibits distension. He exhibits no shifting dullness, no pulsatile liver, no fluid wave and no ascites. There is no splenomegaly or hepatomegaly. No hernia.   Moderate ascites   Musculoskeletal: He exhibits edema (3+ bilateral).   Neurological: He is alert and oriented to person, place, and time. He is not disoriented.   No asterixis   Skin: Skin is warm and dry. No rash noted. He is not diaphoretic.   Palmar erythema and spider angioma   Psychiatric: He has a normal mood and affect. His behavior is normal. His mood appears not anxious. His affect is not inappropriate. He is not agitated. He is communicative. He is attentive.   Nursing note and vitals reviewed.      LABS: I personally reviewed pertinent laboratory findings.    Lab Results   Component Value Date    ALT 17 10/25/2018    AST 29 10/25/2018    ALKPHOS 172 (H) 10/25/2018    BILITOT 1.0 10/25/2018       Lab Results   Component Value Date    WBC 6.75 09/25/2018    HGB 10.1 (L) 09/25/2018    HCT 34.9 (L) 09/25/2018    MCV 79 (L) 09/25/2018     (L) 09/25/2018       Lab Results   Component Value Date     10/25/2018    K 4.4 10/25/2018     10/25/2018    CO2 21 (L) 10/25/2018    BUN 12 10/25/2018    CREATININE 0.9 10/25/2018    CALCIUM 9.3 10/25/2018    ANIONGAP 13 10/25/2018    ESTGFRAFRICA  >60.0 10/25/2018    EGFRNONAA >60.0 10/25/2018       Lab Results   Component Value Date    HEPCAB Negative 05/22/2018       No results found for: SMOOTHMUSCAB, MITOAB    I personally reviewed all recent lab results.  I personally reviewed imaging studies.    Assessment:  78 y.o. male presenting with     1. Anasarca    2. Other ascites    3. Cirrhosis of liver with ascites, unspecified hepatic cirrhosis type    4. Leg edema      HCC: 7 cm x 6.5 cm mass s/p TARE (y-90) x 3, last MRI 12/2018 - no residual or recurrent HCC.   BCLC:       Cirrhosis: likely DRISCOLL, used to be well-decompensated, noted decompensation - ascites, anasarca, volume overload. MELD: 18 (used to be 9) with YEMI Cr - 1.7.    Recommendation(s):  - admit to hospital for the treatment of cirrhosis-related decompensations; volume overload, anasarca, YEMI  - urine lytes  - hydration with albumin 25% volume expander  - diuretics - increase loop-diuretics and add K-sparing      A total of 45 minutes were spent face-to-face with the patient during this encounter and over half of that time was spent on counseling and coordination of care.  We discussed in depth the nature of the patient's liver disease, the management plan in details. I also educated the patient about lifestyle modifications which may improve hepatic steatosis, overweight/obesity, insulin resistance and high blood pressure issues. I have provided the patient with an opportunity to ask questions and have all questions answered to his satisfaction.     I have sent communication to the referring physician and/or primary care provider.    Estelle Dumont MD  Staff Physician  Hepatology and Liver Transplant  [facility]  [facility] [place]

## 2019-01-03 PROBLEM — D69.6 THROMBOCYTOPENIA: Status: ACTIVE | Noted: 2019-01-01

## 2019-01-03 PROBLEM — K74.60 DECOMPENSATED HEPATIC CIRRHOSIS: Status: ACTIVE | Noted: 2019-01-01

## 2019-01-03 PROBLEM — E66.01 MORBID OBESITY: Status: ACTIVE | Noted: 2019-01-01

## 2019-01-03 PROBLEM — K72.90 DECOMPENSATED HEPATIC CIRRHOSIS: Status: ACTIVE | Noted: 2019-01-01

## 2019-01-03 PROBLEM — K75.81 LIVER CIRRHOSIS SECONDARY TO NASH: Status: ACTIVE | Noted: 2018-01-01

## 2019-01-03 PROBLEM — D63.8 ANEMIA OF CHRONIC DISEASE: Status: ACTIVE | Noted: 2019-01-01

## 2019-01-03 NOTE — ASSESSMENT & PLAN NOTE
- rate controlled  - continue toprol and verapamil  - holding DOAC given possibility of therapeutic/diagnostic para  - continue statin

## 2019-01-03 NOTE — SUBJECTIVE & OBJECTIVE
Review of Systems   Constitutional: Negative for activity change, appetite change, chills and fever.   HENT: Negative for sore throat and trouble swallowing.    Respiratory: Positive for cough. Negative for shortness of breath.    Cardiovascular: Positive for leg swelling. Negative for chest pain.   Gastrointestinal: Positive for abdominal distention. Negative for abdominal pain, blood in stool, constipation, diarrhea, nausea and vomiting.   Genitourinary: Negative for decreased urine volume and difficulty urinating.   Musculoskeletal: Negative for arthralgias and back pain.   Skin: Negative for color change and pallor.   Neurological: Negative for dizziness and light-headedness.   Psychiatric/Behavioral: Negative for agitation and confusion.       Past Medical History:   Diagnosis Date    Anemia     Cancer     Diabetes mellitus     Encounter for blood transfusion     GI bleed 8/6/2003    Hypertension     Liver carcinoma     Obstructive sleep apnea on CPAP     Sleep apnea 12/18/15    Stroke 2014    Stroke 7/16/14    TIA (transient ischemic attack) 2/11/15       Past Surgical History:   Procedure Laterality Date    APPENDECTOMY  teen    [id number] N/A 4/8/2016    Performed by [facility] Diagnostic Provider at [facility] OR [address] [place] [address]    BIOPSY-LIVER-LAPAROSCOPIC N/A 4/25/2016    Performed by Narinder Chavez MD at [facility] OR [address] [place] [address]    BONE MARROW BIOPSY      CATARACT EXTRACTION  12/10/11    CHOLECYSTECTOMY      CHOLECYSTECTOMY-LAPAROSCOPIC  4/25/2016    Performed by Narinder Chavez MD at [facility] OR [address] [place] [address]    COLONOSCOPY  12/21/15    EMBOLIZATION, ARTERY, LIVER, FOR SELECTIVE INTERNAL RADIATION THERAPY USING YTTRIUM-90 MICROSPHERES N/A 9/25/2018    [facility]    EMBOLIZATION, BLOOD VESSEL N/A 8/8/2018    [facility]    Embolization, Yttrium Therapy N/A 7/18/2018    Performed by [facility] Diagnostic Provider at [facility] OR  2ND FLR    Embolization, Yttrium Therapy N/A 3/29/2018    Performed by [facility] Diagnostic Provider at [facility]ND [facility]    Embolization, Yttrium Therapy N/A 3/8/2018    Performed by [facility] Diagnostic Provider at [facility] OR [facility]ND [facility]    JOINT REPLACEMENT Bilateral     knees    KNEE SURGERY Right replacement    KNEE SURGERY Left     TONSILLECTOMY         Family history of liver disease: No    Review of patient's allergies indicates:   Allergen Reactions    Altace [ramipril] Anaphylaxis    Adhesive tape-silicones Blisters       Tobacco Use    Smoking status: Never Smoker    Smokeless tobacco: Never Used   Substance and Sexual Activity    Alcohol use: No    Drug use: No    Sexual activity: Not Currently     Partners: Female     Birth control/protection: None       Medications Prior to Admission   Medication Sig Dispense Refill Last Dose    apixaban (ELIQUIS) 5 mg Tab Take 5 mg by mouth 2 (two) times daily.   Taking    aspirin 81 MG Chew Take 81 mg by mouth once daily.   Taking    cyanocobalamin (VITAMIN B-12) 1000 MCG tablet Take 100 mcg by mouth once daily.   Taking    finasteride (PROSCAR) 5 mg tablet Take 5 mg by mouth once daily.    Taking    furosemide (LASIX) 40 MG tablet Take 40 mg by mouth once daily.    Taking    iron polysaccharides (NIFEREX) 150 mg iron Cap Take 150 mg by mouth 2 (two) times daily.   Taking    metformin (GLUCOPHAGE) 500 MG tablet Take 1,000 mg by mouth 2 (two) times daily with meals.    Taking    metoprolol succinate (TOPROL-XL) 25 MG 24 hr tablet Take 25 mg by mouth 2 (two) times daily.   Taking    MULTIVIT-IRON-MIN-FOLIC ACID 3,500-18-0.4 UNIT-MG-MG ORAL CHEW Take by mouth.   Taking    pantoprazole (PROTONIX) 40 MG tablet Take 40 mg by mouth once daily.   Taking    pravastatin (PRAVACHOL) 10 MG tablet Take 10 mg by mouth once daily.   Taking    tamsulosin (FLOMAX) 0.4 mg Cp24 Take 0.8 mg by mouth every evening.    Taking    verapamil (VERELAN PM) 300 mg 24 hr capsule Take 300  mg by mouth once daily.    Taking    vitamin D 1000 units Tab Take 2,000 Units by mouth once daily.   Taking    spironolactone (ALDACTONE) 50 MG tablet Take 50 mg by mouth once daily.          Objective:     Vital Signs (Most Recent):  Temp: 98.6 °F (37 °C) (01/03/19 1201)  Pulse: 77 (01/03/19 1201)  Resp: 18 (01/03/19 1201)  BP: (!) 142/67 (01/03/19 1201)  SpO2: (!) 94 % (01/03/19 1201) Vital Signs (24h Range):  Temp:  [96.3 °F (35.7 °C)-98.7 °F (37.1 °C)] 98.6 °F (37 °C)  Pulse:  [68-97] 77  Resp:  [16-19] 18  SpO2:  [94 %-98 %] 94 %  BP: (128-167)/(67-86) 142/67     Weight: 122.5 kg (270 lb 1 oz) (01/02/19 1800)  Body mass index is 46.36 kg/m².    Physical Exam   Constitutional: He is oriented to person, place, and time. No distress.   HENT:   Mouth/Throat: Oropharynx is clear and moist.   Eyes: No scleral icterus.   Cardiovascular: Normal rate and regular rhythm.   Pulmonary/Chest: Effort normal and breath sounds normal.   Abdominal: He exhibits distension. There is no tenderness.   Musculoskeletal: He exhibits edema. He exhibits no deformity.   Neurological: He is alert and oriented to person, place, and time.   Skin: Skin is warm and dry.   Psychiatric: He has a normal mood and affect.   Vitals reviewed.      MELD-Na score: 18 at 1/3/2019  5:15 AM  MELD score: 18 at 1/3/2019  5:15 AM  Calculated from:  Serum Creatinine: 1.7 mg/dL at 1/3/2019  5:14 AM  Serum Sodium: 139 mmol/L (Rounded to 137 mmol/L) at 1/3/2019  5:14 AM  Total Bilirubin: 1.7 mg/dL at 1/3/2019  5:14 AM  INR(ratio): 1.5 at 1/3/2019  5:15 AM  Age: 78 years    Significant Labs:  CBC:   Recent Labs   Lab 01/03/19  0515   WBC 5.51   RBC 3.68*   HGB 9.2*   HCT 30.1*   *     CMP:   Recent Labs   Lab 01/03/19  0514   *   CALCIUM 8.8   ALBUMIN 3.1*   PROT 5.7*      K 4.3   CO2 29      BUN 26*   CREATININE 1.7*   ALKPHOS 162*   ALT 18   AST 31   BILITOT 1.7*     Coagulation:   Recent Labs   Lab 01/03/19  0515   INR 1.5*

## 2019-01-03 NOTE — ASSESSMENT & PLAN NOTE
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 No evidence of residual tumor seen on most recent abdominal scan.    Recommendations:  - Administer albumin for volume expansion  - Diuretics for volume overload  - Diagnostic and therapeutic paracentesis  - Workup of YEMI, consider HRS if no improvement with albumin

## 2019-01-03 NOTE — PROCEDURES
Radiology Post-Procedure Note    Pre Op Diagnosis: Ascites  Post Op Diagnosis: Same    Procedure: Paracentesis    Procedure performed by: Gabe Clark M.D.     Written Informed Consent Obtained: Yes  Specimen Removed: YES   Estimated Blood Loss: Minimal    Findings:   Successful paracentesis.  Albumin administered PRN per protocol.    Patient tolerated procedure well.    Gabe Clark M.D.   PGY-2  Radiology

## 2019-01-03 NOTE — ASSESSMENT & PLAN NOTE
Hossein Sanchez . is a 78 y.o. gentleman with DRISCOLL cirrhosis, HCC s/p radioembolization, adenoCA of lung s/p SBRT, atrial fibrillation on xarelto who presents to [facility] for evaluation for new onset anasarca.  Overall, patient has never had any abdominal ascites and has noted new distension for the past 3 months.  Differential includes decompensated cirrhosis versus renal failure (HRS?) versus cardiac.  No other cardiac history besides atrial fibrillation to indicate heart failure (no ACS), and HR seems controlled as an outpatient.  No previous history of kidney disease and no new nephrotoxic medications.  Patient admitted for further evaluation for possible decompensated cirrhosis leading to anasarca.  Will start albumin infusion for volume expansion.  Urine lytes ordered.  Patient otherwise hemodynamically stable without any significant issues.    Plan  - albumin transfusion, will start albumin 25% 50g  q 8 hours for 3 doses first, pending PIV.  If cannot, may need PICC team in AM for assistance  - holding oral/IV diuretics at this time   - urine lytes sent  - 2D echo ordered for cardiac evaluation  - hepatology consulted, appreciate recommendations  - trending kidney/liver function daily with CMP  - anticoagulation held for possibility of diagnostic/therapeutic paracentesis, can restart after

## 2019-01-03 NOTE — PROGRESS NOTES
Pt arrived to U 1 for paracentesis.  Name verified using two identifiers.  Allergies verified.  Will continue to monitor.

## 2019-01-03 NOTE — ASSESSMENT & PLAN NOTE
MELD-Na score: 18 at 1/2/2019 12:00 PM  MELD score: 18 at 1/2/2019 12:00 PM  Calculated from:  Serum Creatinine: 1.7 mg/dL at 1/2/2019 12:00 PM  Serum Sodium: 140 mmol/L (Rounded to 137 mmol/L) at 1/2/2019 12:00 PM  Total Bilirubin: 1.5 mg/dL at 1/2/2019 12:00 PM  INR(ratio): 1.5 at 1/2/2019 12:00 PM  Age: 78 years   - treatment as above for now  - no significant bleeding or confusion

## 2019-01-03 NOTE — PROGRESS NOTES
Paracentesis complete. 4900 mLs peritoneal fluid drained. Pt tolerated well. Dressing to llq clean, dry, and intact. Albumin 25% given 100 mLs. Specimens sent per lab order. Report called to floor nurse, FRANCISCO JAVIER Estes.  Pt transport pending.

## 2019-01-03 NOTE — HPI
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 Patient was seen by Dr. Dumont yesterday in clinic. Patient reports worsening abdominal distention that started about three months ago. He denies any previous history of large volume paracentesis. He is on diuretics at home and is compliant. He otherwise feels okay except for abdominal distention. On routine labs checked he was noted to have an elevated Cr of 1.7 off his baseline of 0.9. He denies any alcohol use. On his most recent imaging on MR abdomen from 12/21 no residual tumor was seen.

## 2019-01-03 NOTE — ASSESSMENT & PLAN NOTE
- hemoglobin of 11.4, stable, no evidence of acute bleed  - daily CBC, hold anticoag as noted above, hold protonix

## 2019-01-03 NOTE — H&P
Inpatient Radiology Pre-procedure Note    History of Present Illness:  Hossein Sanchez Sr. is a 78 y.o. male with ascites who presents for paracentesis.    Admission H&P reviewed.  Past Medical History:   Diagnosis Date    Anemia     Cancer     Diabetes mellitus     Encounter for blood transfusion     GI bleed 8/6/2003    Hypertension     Liver carcinoma     Obstructive sleep apnea on CPAP     Sleep apnea 12/18/15    Stroke 2014    Stroke 7/16/14    TIA (transient ischemic attack) 2/11/15     Past Surgical History:   Procedure Laterality Date    APPENDECTOMY  teen    [id number] N/A 4/8/2016    [facility]    BIOPSY-LIVER-LAPAROSCOPIC N/A 4/25/2016    Performed by Narinder Chavez MD at [facility] OR [address] [place] [address]    BONE MARROW BIOPSY      CATARACT EXTRACTION  12/10/11    CHOLECYSTECTOMY      CHOLECYSTECTOMY-LAPAROSCOPIC  4/25/2016    Performed by Narinder Chavez MD at [facility] OR [address] [place] [address]    COLONOSCOPY  12/21/15    EMBOLIZATION, ARTERY, LIVER, FOR SELECTIVE INTERNAL RADIATION THERAPY USING YTTRIUM-90 MICROSPHERES N/A 9/25/2018    Per[facility]    EMBOLIZATION, BLOOD VESSEL N/A 8/8/2018    Performed [facility]    Embolization, Yttrium Therapy N/A 7/18/2018    Performed [facility]    Embolization, Yttrium Therapy N/A 3/29/2018    Per[facility]    Embolization, Yttrium Therapy N/A 3/8/2018    Per[facility]    JOINT REPLACEMENT Bilateral     knees    KNEE SURGERY Right replacement    KNEE SURGERY Left     TONSILLECTOMY         Review of Systems:   As documented in primary team H&P    Home Meds:   Prior to Admission medications    Medication Sig Start Date End Date Taking? Authorizing Provider   apixaban (ELIQUIS) 5 mg Tab Take 5 mg by mouth 2 (two) times  daily.   Yes Historical Provider, MD   aspirin 81 MG Chew Take 81 mg by mouth once daily.   Yes Historical Provider, MD   cyanocobalamin (VITAMIN B-12) 1000 MCG tablet Take 100 mcg by mouth once daily.   Yes Historical Provider, MD   finasteride (PROSCAR) 5 mg tablet Take 5 mg by mouth once daily.  5/13/16  Yes Historical Provider, MD   furosemide (LASIX) 40 MG tablet Take 40 mg by mouth once daily.  11/12/18  Yes Historical Provider, MD   iron polysaccharides (NIFEREX) 150 mg iron Cap Take 150 mg by mouth 2 (two) times daily.   Yes Historical Provider, MD   metformin (GLUCOPHAGE) 500 MG tablet Take 1,000 mg by mouth 2 (two) times daily with meals.  5/10/16  Yes Historical Provider, MD   metoprolol succinate (TOPROL-XL) 25 MG 24 hr tablet Take 25 mg by mouth 2 (two) times daily.   Yes Historical Provider, MD   MULTIVIT-IRON-MIN-FOLIC ACID 3,500-18-0.4 UNIT-MG-MG ORAL CHEW Take by mouth.   Yes Historical Provider, MD   pantoprazole (PROTONIX) 40 MG tablet Take 40 mg by mouth once daily.   Yes Historical Provider, MD   pravastatin (PRAVACHOL) 10 MG tablet Take 10 mg by mouth once daily.   Yes Historical Provider, MD   tamsulosin (FLOMAX) 0.4 mg Cp24 Take 0.8 mg by mouth every evening.  5/13/16  Yes Historical Provider, MD   verapamil (VERELAN PM) 300 mg 24 hr capsule Take 300 mg by mouth once daily.  5/10/16  Yes Historical Provider, MD   vitamin D 1000 units Tab Take 2,000 Units by mouth once daily.   Yes Historical Provider, MD   spironolactone (ALDACTONE) 50 MG tablet Take 50 mg by mouth once daily.    Historical Provider, MD   calcium carbonate (OS-CAMILA) 600 mg (1,500 mg) Tab Take 600 mg by mouth 2 (two) times daily with meals.  1/3/19  Historical Provider, MD   spironolactone (ALDACTONE) 50 MG tablet Take 1 tablet (50 mg total) by mouth once daily. 1/2/19 1/3/19  Estelle Dumont MD     Scheduled Meds:    albumin human 25%  50 g Intravenous Q8H    apixaban  5 mg Oral BID    cyanocobalamin  1,000 mcg Oral Daily     finasteride  5 mg Oral Daily    furosemide  80 mg Intravenous TID    iron polysaccharides  150 mg Oral BID    metoprolol succinate  25 mg Oral BID    pravastatin  10 mg Oral Daily    tamsulosin  0.4 mg Oral Daily    verapamil  300 mg Oral QHS    vitamin D  2,000 Units Oral Daily     Continuous Infusions:   PRN Meds:acetaminophen, dextrose 50%, dextrose 50%, glucagon (human recombinant), glucose, glucose, insulin aspart U-100, ondansetron, sodium chloride 0.9%  Anticoagulants/Antiplatelets: Apixaban    Allergies:   Review of patient's allergies indicates:   Allergen Reactions    Altace [ramipril] Anaphylaxis    Adhesive tape-silicones Blisters     Sedation Hx: have not been any systemic reactions    Labs:  Recent Labs   Lab 01/03/19 0515   INR 1.5*       Recent Labs   Lab 01/03/19 0515   WBC 5.51   HGB 9.2*   HCT 30.1*   MCV 82   *      Recent Labs   Lab 01/03/19 0514   *      K 4.3      CO2 29   BUN 26*   CREATININE 1.7*   CALCIUM 8.8   MG 1.3*   ALT 18   AST 31   ALBUMIN 3.1*   BILITOT 1.7*         Vitals:  Temp: 98.6 °F (37 °C) (01/03/19 1201)  Pulse: 77 (01/03/19 1201)  Resp: 18 (01/03/19 1201)  BP: (!) 142/67 (01/03/19 1201)  SpO2: (!) 94 % (01/03/19 1201)     Physical Exam:  ASA: n/a  Mallampati: n/a    General: no acute distress  Mental Status: alert and oriented to person, place and time  HEENT: normocephalic, atraumatic  Chest: unlabored breathing  Heart: regular heart rate  Abdomen: distended  Extremity: moves all extremities    Plan: Proceed with paracentesis.  Sedation Plan: Local only.    Ab Elmore MD  Diagnostic and Interventional Radiologist  Department of Radiology  Pager: [phone]

## 2019-01-03 NOTE — ASSESSMENT & PLAN NOTE
- new onset YEMI with Cr of 1.7 from 0.9, no known previous kidney dysfunction  - possible pre-renal from liver dysfunction, HRS?  - volume expansion as per above  - holding protonix  - strict Is and Os  - avoid nephrotoxic medications

## 2019-01-03 NOTE — CONSULTS
[facility]  Hepatology  Consult Note    Patient Name: Hossein Sanchez Sr.  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 Patient was seen by Dr. Dumont yesterday in clinic. Patient reports worsening abdominal distention that started about three months ago. He denies any previous history of large volume paracentesis. He is on diuretics at home and is compliant. He otherwise feels okay except for abdominal distention. On routine labs checked he was noted to have an elevated Cr of 1.7 off his baseline of 0.9. He denies any alcohol use. On his most recent imaging on MR abdomen from 12/21 no residual tumor was seen.      Review of Systems   Constitutional: Negative for activity change, appetite change, chills and fever.   HENT: Negative for sore throat and trouble swallowing.    Respiratory: Positive for cough. Negative for shortness of breath.    Cardiovascular: Positive for leg swelling. Negative for chest pain.   Gastrointestinal: Positive for abdominal distention. Negative for abdominal pain, blood in stool, constipation, diarrhea, nausea and vomiting.   Genitourinary: Negative for decreased urine volume and difficulty urinating.   Musculoskeletal: Negative for arthralgias and back pain.   Skin: Negative for color change and pallor.   Neurological: Negative for dizziness and light-headedness.   Psychiatric/Behavioral: Negative for agitation and confusion.       Past Medical History:    Diagnosis Date    Anemia     Cancer     Diabetes mellitus     Encounter for blood transfusion     GI bleed 8/6/2003    Hypertension     Liver carcinoma     Obstructive sleep apnea on CPAP     Sleep apnea 12/18/15    Stroke 2014    Stroke 7/16/14    TIA (transient ischemic attack) 2/11/15       Past Surgical History:   Procedure Laterality Date    APPENDECTOMY  teen    [id number] N/A 4/8/2016    [facility]    BIOPSY-LIVER-LAPAROSCOPIC N/A 4/25/2016    Performed by Narinder Chavez MD at [facility] OR [address] [place] [address]    BONE MARROW BIOPSY      CATARACT EXTRACTION  12/10/11    CHOLECYSTECTOMY      CHOLECYSTECTOMY-LAPAROSCOPIC  4/25/2016    Performed by Nar[facility] at [facility] OR [address] [place] [address]    COLONOSCOPY  12/21/15    EMBOLIZATION, ARTERY, LIVER, FOR SELECTIVE INTERNAL RADIATION THERAPY USING YTTRIUM-90 MICROSPHERES N/A 9/25/2018    [facility]    EMBOLIZATION, BLOOD VESSEL N/A 8/8/2018    [facility]    Embolization, Yttrium Therapy N/A 7/18/2018    [facility]    Embolization, Yttrium Therapy N/A 3/29/2018    [facility]    Embolization, Yttrium Therapy N/A 3/8/2018    [facility]    JOINT REPLACEMENT Bilateral     knees    KNEE SURGERY Right replacement    KNEE SURGERY Left     TONSILLECTOMY         Family history of liver disease: No    Review of patient's allergies indicates:   Allergen Reactions    Altace [ramipril] Anaphylaxis    Adhesive tape-silicones Blisters       Tobacco Use    Smoking status: Never Smoker    Smokeless tobacco: Never Used   Substance and Sexual Activity    Alcohol use: No    Drug use: No    Sexual activity: Not Currently     Partners: Female     Birth control/protection: None        Medications Prior to Admission   Medication Sig Dispense Refill Last Dose    apixaban (ELIQUIS) 5 mg Tab Take 5 mg by mouth 2 (two) times daily.   Taking    aspirin 81 MG Chew Take 81 mg by mouth once daily.   Taking    cyanocobalamin (VITAMIN B-12) 1000 MCG tablet Take 100 mcg by mouth once daily.   Taking    finasteride (PROSCAR) 5 mg tablet Take 5 mg by mouth once daily.    Taking    furosemide (LASIX) 40 MG tablet Take 40 mg by mouth once daily.    Taking    iron polysaccharides (NIFEREX) 150 mg iron Cap Take 150 mg by mouth 2 (two) times daily.   Taking    metformin (GLUCOPHAGE) 500 MG tablet Take 1,000 mg by mouth 2 (two) times daily with meals.    Taking    metoprolol succinate (TOPROL-XL) 25 MG 24 hr tablet Take 25 mg by mouth 2 (two) times daily.   Taking    MULTIVIT-IRON-MIN-FOLIC ACID 3,500-18-0.4 UNIT-MG-MG ORAL CHEW Take by mouth.   Taking    pantoprazole (PROTONIX) 40 MG tablet Take 40 mg by mouth once daily.   Taking    pravastatin (PRAVACHOL) 10 MG tablet Take 10 mg by mouth once daily.   Taking    tamsulosin (FLOMAX) 0.4 mg Cp24 Take 0.8 mg by mouth every evening.    Taking    verapamil (VERELAN PM) 300 mg 24 hr capsule Take 300 mg by mouth once daily.    Taking    vitamin D 1000 units Tab Take 2,000 Units by mouth once daily.   Taking    spironolactone (ALDACTONE) 50 MG tablet Take 50 mg by mouth once daily.          Objective:     Vital Signs (Most Recent):  Temp: 98.6 °F (37 °C) (01/03/19 1201)  Pulse: 77 (01/03/19 1201)  Resp: 18 (01/03/19 1201)  BP: (!) 142/67 (01/03/19 1201)  SpO2: (!) 94 % (01/03/19 1201) Vital Signs (24h Range):  Temp:  [96.3 °F (35.7 °C)-98.7 °F (37.1 °C)] 98.6 °F (37 °C)  Pulse:  [68-97] 77  Resp:  [16-19] 18  SpO2:  [94 %-98 %] 94 %  BP: (128-167)/(67-86) 142/67     Weight: 122.5 kg (270 lb 1 oz) (01/02/19 1800)  Body mass index is 46.36 kg/m².    Physical Exam   Constitutional: He is oriented to person, place, and time. No distress.   HENT:    Mouth/Throat: Oropharynx is clear and moist.   Eyes: No scleral icterus.   Cardiovascular: Normal rate and regular rhythm.   Pulmonary/Chest: Effort normal and breath sounds normal.   Abdominal: He exhibits distension. There is no tenderness.   Musculoskeletal: He exhibits edema. He exhibits no deformity.   Neurological: He is alert and oriented to person, place, and time.   Skin: Skin is warm and dry.   Psychiatric: He has a normal mood and affect.   Vitals reviewed.      MELD-Na score: 18 at 1/3/2019  5:15 AM  MELD score: 18 at 1/3/2019  5:15 AM  Calculated from:  Serum Creatinine: 1.7 mg/dL at 1/3/2019  5:14 AM  Serum Sodium: 139 mmol/L (Rounded to 137 mmol/L) at 1/3/2019  5:14 AM  Total Bilirubin: 1.7 mg/dL at 1/3/2019  5:14 AM  INR(ratio): 1.5 at 1/3/2019  5:15 AM  Age: 78 years    Significant Labs:  CBC:   Recent Labs   Lab 01/03/19  0515   WBC 5.51   RBC 3.68*   HGB 9.2*   HCT 30.1*   *     CMP:   Recent Labs   Lab 01/03/19  0514   *   CALCIUM 8.8   ALBUMIN 3.1*   PROT 5.7*      K 4.3   CO2 29      BUN 26*   CREATININE 1.7*   ALKPHOS 162*   ALT 18   AST 31   BILITOT 1.7*     Coagulation:   Recent Labs   Lab 01/03/19  0515   INR 1.5*         Assessment/Plan:     Hepatic cirrhosis    77 yo M with 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 No evidence of residual tumor seen on most recent abdominal scan.    Recommendations:  - Administer albumin for volume expansion  - Diuretics for volume overload  - Diagnostic and therapeutic paracentesis  - Workup of YEMI, consider HRS if no improvement with albumin         Thank you for your consult. I will follow-up with patient. Please contact us if you have any additional questions.    Hill Rivera MD  Hepatology  [facility]

## 2019-01-03 NOTE — PROGRESS NOTES
Called [phone] and notified on call physician of pts arrival to room 651. Pt was placed in a gown. Denies pain. NAD noted. VSS, BP was elevated but pt is asymptomatic. Pt being admitted for work up of YEMI and need for IV diuresis. BLE swelling noted. Pt has TEDs in place. Spouse is at bedside. POC reviewed with pt and spouse. Attempted PIV x 2, unsuccessful will have another RN attempt. Pt has hx of CA and poor venous access.

## 2019-01-03 NOTE — SUBJECTIVE & OBJECTIVE
Past Medical History:   Diagnosis Date    Anemia     Cancer     Diabetes mellitus     Encounter for blood transfusion     GI bleed 8/6/2003    Hypertension     Liver carcinoma     Obstructive sleep apnea on CPAP     Sleep apnea 12/18/15    Stroke 2014    Stroke 7/16/14    TIA (transient ischemic attack) 2/11/15       Past Surgical History:   Procedure Laterality Date    APPENDECTOMY  teen    [id number] N/A 4/8/2016    Performed by [facility] at [facility] OR [address] [place] [address]    BIOPSY-LIVER-LAPAROSCOPIC N/A 4/25/2016    Performed by Narinder Chavez MD at [facility] OR [address] [place] [address]    BONE MARROW BIOPSY      CATARACT EXTRACTION  12/10/11    CHOLECYSTECTOMY      CHOLECYSTECTOMY-LAPAROSCOPIC  4/25/2016    Performed by [facility]avez MD at [facility] OR [address] [place] [address]    COLONOSCOPY  12/21/15    EMBOLIZATION, ARTERY, LIVER, FOR SELECTIVE INTERNAL RADIATION THERAPY USING YTTRIUM-90 MICROSPHERES N/A 9/25/2018    [facility]    EMBOLIZATION, BLOOD VESSEL N/A 8/8/2018    [facility]    Embolization, Yttrium Therapy N/A 7/18/2018    [facility]    Embolization, Yttrium Therapy N/A 3/29/2018    [facility]    Embolization, Yttrium Therapy N/A 3/8/2018    Performed by [facility] Provider at [facility] OR [address] [place] [address]    JOINT REPLACEMENT Bilateral     knees    KNEE SURGERY Right replacement    KNEE SURGERY Left     TONSILLECTOMY         Review of patient's allergies indicates:   Allergen Reactions    Altace [ramipril] Anaphylaxis    Adhesive tape-silicones Blisters       No current facility-administered medications on file prior to encounter.      Current Outpatient Medications on File Prior to Encounter   Medication Sig    apixaban (ELIQUIS) 5 mg Tab Take 5 mg by mouth 2 (two) times daily.    aspirin 81 MG Chew Take 81 mg by mouth  once daily.    calcium carbonate (OS-CAMILA) 600 mg (1,500 mg) Tab Take 600 mg by mouth 2 (two) times daily with meals.    cyanocobalamin (VITAMIN B-12) 1000 MCG tablet Take 100 mcg by mouth once daily.    finasteride (PROSCAR) 5 mg tablet     furosemide (LASIX) 80 MG tablet Take 40 mg by mouth once daily.     iron polysaccharides (NIFEREX) 150 mg iron Cap Take 150 mg by mouth 2 (two) times daily.    metformin (GLUCOPHAGE) 500 MG tablet Take 1,000 mg by mouth 2 (two) times daily with meals.     metoprolol succinate (TOPROL-XL) 12.5 MG 24 hr split tablet Take 25 mg by mouth 2 (two) times daily.    MULTIVIT-IRON-MIN-FOLIC ACID 3,500-18-0.4 UNIT-MG-MG ORAL CHEW Take by mouth.    pantoprazole (PROTONIX) 40 MG tablet Take 40 mg by mouth once daily.    pravastatin (PRAVACHOL) 10 MG tablet Take 10 mg by mouth once daily.    spironolactone (ALDACTONE) 50 MG tablet Take 1 tablet (50 mg total) by mouth once daily.    tamsulosin (FLOMAX) 0.4 mg Cp24     verapamil (VERELAN PM) 300 mg 24 hr capsule     vitamin D 1000 units Tab Take 2,000 Units by mouth once daily.     Family History     Problem Relation (Age of Onset)    Cancer Paternal Grandmother    Diabetes Mother, Father, Daughter    Esophageal cancer Brother    Heart disease Mother, Father, Brother    Kidney disease Brother    Lymphoma Brother    No Known Problems Sister, Maternal Aunt, Maternal Uncle, Paternal Aunt, Paternal Uncle, Maternal Grandmother, Maternal Grandfather, Paternal Grandfather    Stomach cancer Brother        Tobacco Use    Smoking status: Never Smoker    Smokeless tobacco: Never Used   Substance and Sexual Activity    Alcohol use: No    Drug use: No    Sexual activity: Not Currently     Partners: Female     Birth control/protection: None     Review of Systems   Constitutional: Positive for appetite change, fatigue and unexpected weight change. Negative for activity change and fever.   HENT: Negative for facial swelling, nosebleeds, sinus  pain and voice change.    Eyes: Negative for pain and visual disturbance.   Respiratory: Positive for cough and shortness of breath. Negative for wheezing.    Cardiovascular: Positive for leg swelling. Negative for chest pain and palpitations.   Gastrointestinal: Positive for abdominal distention. Negative for abdominal pain, constipation, diarrhea, nausea and vomiting.   Endocrine: Negative for polydipsia and polyphagia.   Genitourinary: Positive for difficulty urinating. Negative for dysuria and frequency.   Musculoskeletal: Negative for arthralgias and myalgias.   Neurological: Negative for weakness, light-headedness, numbness and headaches.   Hematological: Negative for adenopathy.   Psychiatric/Behavioral: Negative for behavioral problems and confusion.     Objective:     Vital Signs (Most Recent):  Temp: 96.3 °F (35.7 °C) (01/02/19 1800)  Pulse: 97 (01/02/19 1800)  Resp: 18 (01/02/19 1800)  BP: (!) 167/74 (01/02/19 1800)  SpO2: 98 % (01/02/19 1800) Vital Signs (24h Range):  Temp:  [96.3 °F (35.7 °C)-97.8 °F (36.6 °C)] 96.3 °F (35.7 °C)  Pulse:  [83-97] 97  Resp:  [18] 18  SpO2:  [98 %-99 %] 98 %  BP: (128-167)/(70-74) 167/74     Weight: 122.5 kg (270 lb 1 oz)  Body mass index is 46.36 kg/m².    Physical Exam   Constitutional: He is oriented to person, place, and time. He appears well-developed. No distress.   Resting comfortably, no labored breathing.     HENT:   Head: Normocephalic and atraumatic.   Mouth/Throat: No oropharyngeal exudate.   Neck: Normal range of motion.   Cardiovascular: Normal rate and intact distal pulses.   No murmur heard.  Irregularly irregular rhythm    Pulmonary/Chest: Effort normal.   Decreased breath sounds from mid R L lung field to base, R lung sounds normal   Abdominal: Soft. Bowel sounds are normal. He exhibits distension. There is no tenderness.   Pitting edema   Musculoskeletal: Normal range of motion. He exhibits edema (3+ BLE).   Lymphadenopathy:     He has no cervical  adenopathy.   Neurological: He is alert and oriented to person, place, and time. No cranial nerve deficit. Coordination normal.   Skin: Skin is warm and dry. He is not diaphoretic. No erythema.   Psychiatric: He has a normal mood and affect. His behavior is normal.   Vitals reviewed.          Significant Labs:     Recent Results (from the past 24 hour(s))   Protime-INR    Collection Time: 01/02/19 12:00 PM   Result Value Ref Range    Prothrombin Time 14.8 (H) 9.0 - 12.5 sec    INR 1.5 (H) 0.8 - 1.2   CBC Without Differential    Collection Time: 01/02/19 12:00 PM   Result Value Ref Range    WBC 9.38 3.90 - 12.70 K/uL    RBC 4.50 (L) 4.60 - 6.20 M/uL    Hemoglobin 11.4 (L) 14.0 - 18.0 g/dL    Hematocrit 37.2 (L) 40.0 - 54.0 %    MCV 83 82 - 98 fL    MCH 25.3 (L) 27.0 - 31.0 pg    MCHC 30.6 (L) 32.0 - 36.0 g/dL    RDW 22.5 (H) 11.5 - 14.5 %    Platelets 129 (L) 150 - 350 K/uL    MPV 10.5 9.2 - 12.9 fL   Comprehensive metabolic panel    Collection Time: 01/02/19 12:00 PM   Result Value Ref Range    Sodium 140 136 - 145 mmol/L    Potassium 4.7 3.5 - 5.1 mmol/L    Chloride 101 95 - 110 mmol/L    CO2 23 23 - 29 mmol/L    Glucose 122 (H) 70 - 110 mg/dL    BUN, Bld 25 (H) 8 - 23 mg/dL    Creatinine 1.7 (H) 0.5 - 1.4 mg/dL    Calcium 9.2 8.7 - 10.5 mg/dL    Total Protein 6.2 6.0 - 8.4 g/dL    Albumin 2.6 (L) 3.5 - 5.2 g/dL    Total Bilirubin 1.5 (H) 0.1 - 1.0 mg/dL    Alkaline Phosphatase 203 (H) 55 - 135 U/L    AST 36 10 - 40 U/L    ALT 22 10 - 44 U/L    Anion Gap 16 8 - 16 mmol/L    eGFR if African American 44.3 (A) >60 mL/min/1.73 m^2    eGFR if non  38.3 (A) >60 mL/min/1.73 m^2   AFP tumor marker    Collection Time: 01/02/19 12:00 PM   Result Value Ref Range    AFP 10 (H) 0.0 - 8.4 ng/mL         Significant Imaging: CXR pending

## 2019-01-03 NOTE — PLAN OF CARE
Problem: Adult Inpatient Plan of Care  Goal: Plan of Care Review  Outcome: Ongoing (interventions implemented as appropriate)   01/03/19 1907   Plan of Care Review   Plan of Care Reviewed With patient   Progress improving

## 2019-01-03 NOTE — HPI
"Hossein Sanchez Sr. is a 78 y.o. 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  History is provided by the patient and his wife, who is at bedside.  Patient states he has been in his usual state of health until the past 3 months.  Overall, he has noticed increased abdominal bloating over a gradual course.  This was associated with mild pain that he describes as "pins and needles", but otherwise did not cause significant issues to his quality of life.  Over this time period, he had noticed not only increased swelling there, but all across his body, including his legs.  Does take his home diuretic of lasix 40 mg PO daily and is adherent to it.  Associated symptoms include decreased appetite over this period, as well as weight gain (typically 248, now 270+), and mild non-productive cough.  He at baseline has urinary issues from BPH with no new change.  Denies fevers, chest pains, headaches, or change in bowel habits, or neurological symptoms.  He presented to hepatology clinic for follow up, where he was found to have a new onset YEMI with CMP on 1/2/2019 revealing a Cr of 1.7 with a baseline of 0.9.  He admitted to clinic to hospital medicine for further assessment of YEMI and anasarca.      States he is a non smoker (never smoked) and a previous social drinker who had quit many years ago.  Follows with Oncology and Hepatology here at [facility].  Last visit with oncology on 12/2018 noted that previous PET CT on 11/2018 that inferior R lower liver was isointense.  Hepatology visit today noted no residual HCC.  Notes that he had completed his radiation therapy recently for his lung CA.  Noted to have numerous blood transfusions previously and has numerous colonoscopies (last since 2017) and VCE to work up for GI bleed.  "

## 2019-01-03 NOTE — PHARMACY MED REC
"Admission Medication Reconciliation - Pharmacy Consult Note    The home medication history was taken by Cristina Smith, Pharmacy Technician.    You may go to "Admission" then "Reconcile Home Medications" tabs to review and/or act upon these items.     Potentially problematic discrepancies with current MAR  o Patient is taking a drug DIFFERENTLY than how ordered upon admit  o Verapamil 24hr 300mg PO QHS (ordered as 240mg here)      Lorraine Rolon, PharmD, BCPS  [id number]                    .    .          "

## 2019-01-03 NOTE — H&P
"[facility] Medicine  History & Physical    Patient Name: Hossein Sanchez Sr.  MRN: [id number]  Admission Date: 1/2/2019  Attending Physician: Luis Fernando Navarro MD   Primary Care Provider: Aleksandr Velazquez MD    [facility] Medicine Team: Networked reference to record PCT  Chapin Howard MD     Patient information was obtained from patient, spouse/SO and ER records.     Subjective:     Principal Problem:Anasarca    Chief Complaint: No chief complaint on file.       HPI: Hossein Sanchez Sr. is a 78 y.o. 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  History is provided by the patient and his wife, who is at bedside.  Patient states he has been in his usual state of health until the past 3 months.  Overall, he has noticed increased abdominal bloating over a gradual course.  This was associated with mild pain that he describes as "pins and needles", but otherwise did not cause significant issues to his quality of life.  Over this time period, he had noticed not only increased swelling there, but all across his body, including his legs.  Does take his home diuretic of lasix 40 mg PO daily and is adherent to it.  Associated symptoms include decreased appetite over this period, as well as weight gain (typically 248, now 270+), and mild non-productive cough.  He at baseline has urinary issues from BPH with no new change.  Denies fevers, chest pains, headaches, or change in bowel habits, or neurological symptoms.  He presented to hepatology clinic for follow up, where he was found to have a new onset YEMI with CMP on 1/2/2019 revealing a Cr of 1.7 with a baseline of 0.9.  He admitted to clinic to hospital medicine for further assessment of YEMI and anasarca.      States he is a non smoker (never smoked) " and a previous social drinker who had quit many years ago.  [place] [facility] [place]  Last visit with oncology on 12/2018 noted that previous PET CT on 11/2018 that inferior R lower liver was isointense.  Hepatology visit today noted no residual HCC.  Notes that he had completed his radiation therapy recently for his lung CA.  Noted to have numerous blood transfusions previously and has numerous colonoscopies (last since 2017) and VCE to work up for GI bleed.             Past Medical History:   Diagnosis Date    Anemia     Cancer     Diabetes mellitus     Encounter for blood transfusion     GI bleed 8/6/2003    Hypertension     Liver carcinoma     Obstructive sleep apnea on CPAP     Sleep apnea 12/18/15    Stroke 2014    Stroke 7/16/14    TIA (transient ischemic attack) 2/11/15       Past Surgical History:   Procedure Laterality Date    APPENDECTOMY  teen    [id number]-PL N/A 4/8/2016    [facility]    BIOPSY-LIVER-LAPAROSCOPIC N/A 4/25/2016    Performed by Nar[facility] Chavez [facility] at [facility] OR [address] [place] [address]    BONE MARROW BIOPSY      CATARACT EXTRACTION  12/10/11    CHOLECYSTECTOMY      CHOLECYSTECTOMY-LAPAROSCOPIC  4/25/2016    Performed by [facility] at [facility] OR [address] [place] [address]    COLONOSCOPY  12/21/15    EMBOLIZATION, ARTERY, LIVER, FOR SELECTIVE INTERNAL RADIATION THERAPY USING YTTRIUM-90 MICROSPHERES N/A 9/25/2018    [facility]    EMBOLIZATION, BLOOD VESSEL N/A 8/8/2018    Performed by [facility]    Embolization, Yttrium Therapy N/A 7/18/2018    Performed by [facility]    Embolization, Yttrium Therapy N/A 3/29/2018    Performed by [facility] at [facility]    Embolization, Yttrium Therapy N/A 3/8/2018    Per[facility]    JOINT REPLACEMENT Bilateral      knees    KNEE SURGERY Right replacement    KNEE SURGERY Left     TONSILLECTOMY         Review of patient's allergies indicates:   Allergen Reactions    Altace [ramipril] Anaphylaxis    Adhesive tape-silicones Blisters       No current facility-administered medications on file prior to encounter.      Current Outpatient Medications on File Prior to Encounter   Medication Sig    apixaban (ELIQUIS) 5 mg Tab Take 5 mg by mouth 2 (two) times daily.    aspirin 81 MG Chew Take 81 mg by mouth once daily.    calcium carbonate (OS-CAMILA) 600 mg (1,500 mg) Tab Take 600 mg by mouth 2 (two) times daily with meals.    cyanocobalamin (VITAMIN B-12) 1000 MCG tablet Take 100 mcg by mouth once daily.    finasteride (PROSCAR) 5 mg tablet     furosemide (LASIX) 80 MG tablet Take 40 mg by mouth once daily.     iron polysaccharides (NIFEREX) 150 mg iron Cap Take 150 mg by mouth 2 (two) times daily.    metformin (GLUCOPHAGE) 500 MG tablet Take 1,000 mg by mouth 2 (two) times daily with meals.     metoprolol succinate (TOPROL-XL) 12.5 MG 24 hr split tablet Take 25 mg by mouth 2 (two) times daily.    MULTIVIT-IRON-MIN-FOLIC ACID 3,500-18-0.4 UNIT-MG-MG ORAL CHEW Take by mouth.    pantoprazole (PROTONIX) 40 MG tablet Take 40 mg by mouth once daily.    pravastatin (PRAVACHOL) 10 MG tablet Take 10 mg by mouth once daily.    spironolactone (ALDACTONE) 50 MG tablet Take 1 tablet (50 mg total) by mouth once daily.    tamsulosin (FLOMAX) 0.4 mg Cp24     verapamil (VERELAN PM) 300 mg 24 hr capsule     vitamin D 1000 units Tab Take 2,000 Units by mouth once daily.     Family History     Problem Relation (Age of Onset)    Cancer Paternal Grandmother    Diabetes Mother, Father, Daughter    Esophageal cancer Brother    Heart disease Mother, Father, Brother    Kidney disease Brother    Lymphoma Brother    No Known Problems Sister, Maternal Aunt, Maternal Uncle, Paternal Aunt, Paternal Uncle, Maternal Grandmother, Maternal Grandfather,  Paternal Grandfather    Stomach cancer Brother        Tobacco Use    Smoking status: Never Smoker    Smokeless tobacco: Never Used   Substance and Sexual Activity    Alcohol use: No    Drug use: No    Sexual activity: Not Currently     Partners: Female     Birth control/protection: None     Review of Systems   Constitutional: Positive for appetite change, fatigue and unexpected weight change. Negative for activity change and fever.   HENT: Negative for facial swelling, nosebleeds, sinus pain and voice change.    Eyes: Negative for pain and visual disturbance.   Respiratory: Positive for cough and shortness of breath. Negative for wheezing.    Cardiovascular: Positive for leg swelling. Negative for chest pain and palpitations.   Gastrointestinal: Positive for abdominal distention. Negative for abdominal pain, constipation, diarrhea, nausea and vomiting.   Endocrine: Negative for polydipsia and polyphagia.   Genitourinary: Positive for difficulty urinating. Negative for dysuria and frequency.   Musculoskeletal: Negative for arthralgias and myalgias.   Neurological: Negative for weakness, light-headedness, numbness and headaches.   Hematological: Negative for adenopathy.   Psychiatric/Behavioral: Negative for behavioral problems and confusion.     Objective:     Vital Signs (Most Recent):  Temp: 96.3 °F (35.7 °C) (01/02/19 1800)  Pulse: 97 (01/02/19 1800)  Resp: 18 (01/02/19 1800)  BP: (!) 167/74 (01/02/19 1800)  SpO2: 98 % (01/02/19 1800) Vital Signs (24h Range):  Temp:  [96.3 °F (35.7 °C)-97.8 °F (36.6 °C)] 96.3 °F (35.7 °C)  Pulse:  [83-97] 97  Resp:  [18] 18  SpO2:  [98 %-99 %] 98 %  BP: (128-167)/(70-74) 167/74     Weight: 122.5 kg (270 lb 1 oz)  Body mass index is 46.36 kg/m².    Physical Exam   Constitutional: He is oriented to person, place, and time. He appears well-developed. No distress.   Resting comfortably, no labored breathing.     HENT:   Head: Normocephalic and atraumatic.   Mouth/Throat: No  oropharyngeal exudate.   Neck: Normal range of motion.   Cardiovascular: Normal rate and intact distal pulses.   No murmur heard.  Irregularly irregular rhythm    Pulmonary/Chest: Effort normal.   Decreased breath sounds from mid R L lung field to base, R lung sounds normal   Abdominal: Soft. Bowel sounds are normal. He exhibits distension. There is no tenderness.   Pitting edema   Musculoskeletal: Normal range of motion. He exhibits edema (3+ BLE).   Lymphadenopathy:     He has no cervical adenopathy.   Neurological: He is alert and oriented to person, place, and time. No cranial nerve deficit. Coordination normal.   Skin: Skin is warm and dry. He is not diaphoretic. No erythema.   Psychiatric: He has a normal mood and affect. His behavior is normal.   Vitals reviewed.          Significant Labs:     Recent Results (from the past 24 hour(s))   Protime-INR    Collection Time: 01/02/19 12:00 PM   Result Value Ref Range    Prothrombin Time 14.8 (H) 9.0 - 12.5 sec    INR 1.5 (H) 0.8 - 1.2   CBC Without Differential    Collection Time: 01/02/19 12:00 PM   Result Value Ref Range    WBC 9.38 3.90 - 12.70 K/uL    RBC 4.50 (L) 4.60 - 6.20 M/uL    Hemoglobin 11.4 (L) 14.0 - 18.0 g/dL    Hematocrit 37.2 (L) 40.0 - 54.0 %    MCV 83 82 - 98 fL    MCH 25.3 (L) 27.0 - 31.0 pg    MCHC 30.6 (L) 32.0 - 36.0 g/dL    RDW 22.5 (H) 11.5 - 14.5 %    Platelets 129 (L) 150 - 350 K/uL    MPV 10.5 9.2 - 12.9 fL   Comprehensive metabolic panel    Collection Time: 01/02/19 12:00 PM   Result Value Ref Range    Sodium 140 136 - 145 mmol/L    Potassium 4.7 3.5 - 5.1 mmol/L    Chloride 101 95 - 110 mmol/L    CO2 23 23 - 29 mmol/L    Glucose 122 (H) 70 - 110 mg/dL    BUN, Bld 25 (H) 8 - 23 mg/dL    Creatinine 1.7 (H) 0.5 - 1.4 mg/dL    Calcium 9.2 8.7 - 10.5 mg/dL    Total Protein 6.2 6.0 - 8.4 g/dL    Albumin 2.6 (L) 3.5 - 5.2 g/dL    Total Bilirubin 1.5 (H) 0.1 - 1.0 mg/dL    Alkaline Phosphatase 203 (H) 55 - 135 U/L    AST 36 10 - 40 U/L    ALT  22 10 - 44 U/L    Anion Gap 16 8 - 16 mmol/L    eGFR if African American 44.3 (A) >60 mL/min/1.73 m^2    eGFR if non  38.3 (A) >60 mL/min/1.73 m^2   AFP tumor marker    Collection Time: 01/02/19 12:00 PM   Result Value Ref Range    AFP 10 (H) 0.0 - 8.4 ng/mL         Significant Imaging: CXR pending    Assessment/Plan:     Vijay Sanchez Sr. is a 78 y.o. gentleman with DRISCOLL cirrhosis, HCC s/p radioembolization, adenoCA of lung s/p SBRT, atrial fibrillation on xarelto who presents to [facility] for evaluation for new onset anasarca.  Overall, patient has never had any abdominal ascites and has noted new distension for the past 3 months.  Differential includes decompensated cirrhosis versus renal failure (HRS?) versus cardiac.  No other cardiac history besides atrial fibrillation to indicate heart failure (no ACS), and HR seems controlled as an outpatient.  No previous history of kidney disease and no new nephrotoxic medications.  Patient admitted for further evaluation for possible decompensated cirrhosis leading to anasarca.  Will start albumin infusion for volume expansion.  Urine lytes ordered.  Patient otherwise hemodynamically stable without any significant issues.    Plan  - albumin transfusion, will start albumin 25% 50g  q 8 hours for 3 doses first, pending PIV.  If cannot, may need PICC team in AM for assistance  - holding oral/IV diuretics at this time   - urine lytes sent  - 2D echo ordered for cardiac evaluation  - hepatology consulted, appreciate recommendations  - trending kidney/liver function daily with CMP  - anticoagulation held for possibility of diagnostic/therapeutic paracentesis, can restart after       Acute kidney injury    - new onset YEMI with Cr of 1.7 from 0.9, no known previous kidney dysfunction  - possible pre-renal from liver dysfunction, HRS?  - volume expansion as per above  - holding protonix  - strict Is and Os  - avoid nephrotoxic medications       Hepatic  cirrhosis    MELD-Na score: 18 at 1/2/2019 12:00 PM  MELD score: 18 at 1/2/2019 12:00 PM  Calculated from:  Serum Creatinine: 1.7 mg/dL at 1/2/2019 12:00 PM  Serum Sodium: 140 mmol/L (Rounded to 137 mmol/L) at 1/2/2019 12:00 PM  Total Bilirubin: 1.5 mg/dL at 1/2/2019 12:00 PM  INR(ratio): 1.5 at 1/2/2019 12:00 PM  Age: 78 years   - treatment as above for now  - no significant bleeding or confusion       Chronic a-fib    - rate controlled  - continue toprol and verapamil  - holding DOAC given possibility of therapeutic/diagnostic para  - continue statin       Type 2 diabetes mellitus without complication, without long-term current use of insulin    - blood sugar WNL on CMP earlier  - last a1c in 2016, will repeat  - LDSSI, accuchecks AC HS        HCC (hepatocellular carcinoma)    - no evidence of recurrent disease, no issues for now      Iron deficiency anemia    - hemoglobin of 11.4, stable, no evidence of acute bleed  - daily CBC, hold anticoag as noted above, hold protonix        Benign prostatic hyperplasia with urinary hesitancy    - symptomatic but stable  - continue flomax and finasteride       Vitamin D deficiency    - continue home Vit D supplementation          VTE Risk Mitigation (From admission, onward)        Ordered     Reason for No Pharmacological VTE Prophylaxis  Once      01/02/19 1901     IP VTE HIGH RISK PATIENT  Once      01/02/19 1901             Chapin Howard MD  Department of Hospital Medicine   [facility]

## 2019-01-04 PROBLEM — I50.31 ACUTE DIASTOLIC HEART FAILURE: Status: ACTIVE | Noted: 2019-01-01

## 2019-01-04 PROBLEM — N30.00 ACUTE CYSTITIS WITHOUT HEMATURIA: Status: ACTIVE | Noted: 2019-01-01

## 2019-01-04 PROBLEM — N39.0 ENTEROCOCCUS UTI: Status: ACTIVE | Noted: 2019-01-01

## 2019-01-04 PROBLEM — B95.2 ENTEROCOCCUS UTI: Status: ACTIVE | Noted: 2019-01-01

## 2019-01-04 NOTE — PT/OT/SLP EVAL
"Physical Therapy Evaluation and Discharge Note    Patient Name:  Hossein Sanchez Sr.   MRN:  [id number]    Recommendations:     Discharge Recommendations:  (home no needs)   Discharge Equipment Recommendations: none   Barriers to discharge: None    Assessment:     Hossein Sanchez Sr. is a 78 y.o. male admitted with a medical diagnosis of Anasarca. .  At this time, patient is functioning at their prior level of function and does not require further acute PT services.     Recent Surgery: * No surgery found *      Plan:     During this hospitalization, patient does not require further acute PT services.  Please re-consult if situation changes.      Subjective     Chief Complaint: no true complaints   Patient/Family Comments/goals: "Hopefully I get all this fluid out" per pt during session.   Pain/Comfort:  · Pain Rating 1: 0/10    Patients cultural, spiritual, Jew conflicts given the current situation: no    Living Environment:  Pt lives with wife in one story home with no DARA. (I) with mobility and self care. Still driving but not working. Owns shower chair  Objective:     Communicated with nsg prior to session.  Patient found supine In bed upon PT entry to room found with:       General Precautions: Standard, fall   Orthopedic Precautions:N/A   Braces: N/A       Exams:  Cognitive Exam  Patient is oriented to Person, Place, Time and Situation and follows 100% of multi-step commands    Fine Motor Coordination    -       Intact   Postural Exam Patient presented with the following abnormalities:    -       No postural abnormalities identified   Sensation    -       Intact   Skin Integrity/Edema     -       Skin integrity: Thin to (B) LE  -       Edema: Mild  to (B) LE   R LE ROM WFL   R LE Strength  WFL   L LE ROM WFL   L LE Strength  WFL       Functional Mobility  Bed Mobility  Supine to Sit: stand by assistance      Transfers Sit to Stand:  stand by assistance with no AD for 2 trials     Gait Gait Distance: 200 ft no " AD  Assistance Level: stand by assistance  Description: normal salvador with wide base of support due to incr (B) LE swelling         Balance   Static Sitting supervision   Dynamic Sitting supervision   Static Standing stand by assistance   Dynamic Standing stand by assistance         AM-PAC 6 CLICK MOBILITY  Total Score:24       Therapeutic Activities and Exercises:    PT educated pt on the following  - role of PT  - PT POC (including frequency and duration while in hospital)  - discharge recommendation (Home no needs) and equipment needs (none)  - level of assistance currently req (1 person)and safety precautions with ns staff   All questions and concerns answered and addressed. White board updated with pertinent information. Nsg notified.       AM-PAC 6 CLICK MOBILITY  Total Score:24     Patient left seated edge of bed with all lines intact and call button in reach.    GOALS:   Multidisciplinary Problems     Physical Therapy Goals     Not on file                History:     Past Medical History:   Diagnosis Date    Anemia     Cancer     Diabetes mellitus     Encounter for blood transfusion     GI bleed 8/6/2003    Hypertension     Liver carcinoma     Obstructive sleep apnea on CPAP     Sleep apnea 12/18/15    Stroke 2014    Stroke 7/16/14    TIA (transient ischemic attack) 2/11/15       Past Surgical History:   Procedure Laterality Date    APPENDECTOMY  teen    [id number] N/A 4/8/2016    Performed by [facility] Provider at [facility]    BIOPSY-LIVER-LAPAROSCOPIC N/A 4/25/2016    Performed by [facility] at [facility]    BONE MARROW BIOPSY      CATARACT EXTRACTION  12/10/11    CHOLECYSTECTOMY      CHOLECYSTECTOMY-LAPAROSCOPIC  4/25/2016    Performed by [facility] at [facility]    COLONOSCOPY  12/21/15    EMBOLIZATION, ARTERY, LIVER, FOR SELECTIVE INTERNAL RADIATION THERAPY USING YTTRIUM-90 MICROSPHERES N/A 9/25/2018    Performed by [facility] Diagnostic  Provider at [facility] OR [facility]    EMBOLIZATION, BLOOD VESSEL N/A 8/8/2018    Performed by [facility] Diagnostic Provider at [facility]    Embolization, Yttrium Therapy N/A 7/18/2018    Performed by [facility] Diagnostic Provider at [facility]    Embolization, Yttrium Therapy N/A 3/29/2018    Performed by [facility] Diagnostic Provider at [facility]    Embolization, Yttrium Therapy N/A 3/8/2018    Performed by [facility] Diagnostic Provider at [facility]    JOINT REPLACEMENT Bilateral     knees    KNEE SURGERY Right replacement    KNEE SURGERY Left     TONSILLECTOMY         Clinical Decision Making:     History  Co-morbidities and personal factors that may impact the plan of care Examination  Body Structures and Functions, activity limitations and participation restrictions that may impact the plan of care Clinical Presentation   Decision Making/ Complexity Score   Co-morbidities:   [] Time since onset of injury / illness / exacerbation  [x] Status of current condition  []Patient's cognitive status and safety concerns    [x] Multiple Medical Problems (see med hx)  Personal Factors:   [] Patient's age  [] Prior Level of function   [] Patient's home situation (environment and family support)  [] Patient's level of motivation  [] Expected progression of patient      HISTORY:(criteria)    [] 83455 - no personal factors/history    [x] 37490 - has 1-2 personal factor/comorbidity     [] 60351 - has >3 personal factor/comorbidity     Body Regions:  [] Objective examination findings  [] Head     []  Neck  [] Trunk   [] Upper Extremity  [x] Lower Extremity    Body Systems:  [] For communication ability, affect, cognition, language, and learning style: the assessment of the ability to make needs known, consciousness, orientation (person, place, and time), expected emotional /behavioral responses, and learning preferences (eg, learning barriers, education  needs)  [x] For the neuromuscular system: a general assessment of gross  coordinated movement (eg, balance, gait, locomotion, transfers, and transitions) and motor function  (motor control and motor learning)  [] For the musculoskeletal system: the assessment of gross symmetry, gross range of motion, gross strength, height, and weight  [] For the integumentary system: the assessment of pliability(texture), presence of scar formation, skin color, and skin integrity  [] For cardiovascular/pulmonary system: the assessment of heart rate, respiratory rate, blood pressure, and edema     Activity limitations:    [] Patient's cognitive status and saf ety concerns          [x] Status of current condition      [] Weight bearing restriction  [] Cardiopulmunary Restriction    Participation Restrictions:   [] Goals and goal agreement with the patient     [] Rehab potential (prognosis) and probable outcome      Examination of Body System: (criteria)    [] 03468 - addressing 1-2 elements    [x] 15438 - addressing a total of 3 or more elements     [] 18395 -  Addressing a total of 4 or more elements         Clinical Presentation: (criteria)  Stable - 00691     On examination of body system using standardized tests and measures patient presents with 1-2 elements from any of the following: body structures and functions, activity limitations, and/or participation restrictions.  Leading to a clinical presentation that is considered stable and/or uncomplicated                              Clinical Decision Making  (Eval Complexity):  Low- 84873     Time Tracking:     PT Received On: 01/04/19  PT Start Time: 1003     PT Stop Time: 1015  PT Total Time (min): 12 min     Billable Minutes: Evaluation 12      Jessika Araiza, PT  01/04/2019

## 2019-01-04 NOTE — PROGRESS NOTES
[facility]  Hepatology  Progress Note    Patient Name: Hossein Sanchez Sr.  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 Patient was seen by Dr. Dumont yesterday in clinic. Patient reports worsening abdominal distention that started about three months ago. He denies any previous history of large volume paracentesis. He is on diuretics at home and is compliant. He otherwise feels okay except for abdominal distention. On routine labs checked he was noted to have an elevated Cr of 1.7 off his baseline of 0.9. He denies any alcohol use. On his most recent imaging on MR abdomen from 12/21 no residual tumor was seen.      Interval History: Underwent para yesterday with 4.9L removed, no SBP. Feels much better today. Still with volume overload.    Current Facility-Administered Medications   Medication    acetaminophen tablet 650 mg    apixaban tablet 5 mg    cyanocobalamin tablet 1,000 mcg    dextrose 50% injection 12.5 g    dextrose 50% injection 25 g    finasteride tablet 5 mg    furosemide injection 80 mg    glucagon (human recombinant) injection 1 mg    glucose chewable tablet 16 g    glucose chewable tablet 24 g    insulin aspart U-100 pen 0-5 Units    iron polysaccharides capsule 150 mg    magnesium sulfate 2 g/50 ml IVPB    metoprolol succinate (TOPROL-XL) 24 hr tablet 25 mg    ondansetron disintegrating tablet 8 mg    pravastatin tablet 10 mg    sodium chloride 0.9% flush 5 mL    tamsulosin 24 hr capsule 0.4 mg    verapamil CR tablet 300 mg    vitamin D 1000 units tablet 2,000 Units        Objective:     Vital Signs (Most Recent):  Temp: 97.9 °F (36.6 °C) (01/04/19 0848)  Pulse: 62 (01/04/19 0848)  Resp: 18 (01/04/19 0848)  BP: 137/60 (01/04/19 0848)  SpO2: 97 % (01/04/19 0848) Vital Signs (24h Range):  Temp:  [97.5 °F (36.4 °C)-98.7 °F (37.1 °C)] 97.9 °F (36.6 °C)  Pulse:  [58-79] 62  Resp:  [16-18] 18  SpO2:  [93 %-97 %] 97 %  BP: (127-152)/(60-92) 137/60     Weight: 117.5 kg (259 lb) (01/04/19 0603)  Body mass index is 44.46 kg/m².    Physical Exam   Constitutional: He is oriented to person, place, and time. No distress.   Eyes: No scleral icterus.   Cardiovascular: Normal rate and regular rhythm.   Pulmonary/Chest: Effort normal and breath sounds normal.   Abdominal: There is no tenderness.   Less distended   Musculoskeletal: He exhibits edema. He exhibits no deformity.   Neurological: He is alert and oriented to person, place, and time.   Skin: Skin is warm and dry.   Psychiatric: He has a normal mood and affect.   Vitals reviewed.      MELD-Na score: 18 at 1/4/2019  3:59 AM  MELD score: 18 at 1/4/2019  3:59 AM  Calculated from:  Serum Creatinine: 1.6 mg/dL at 1/4/2019  3:59 AM  Serum Sodium: 139 mmol/L (Rounded to 137 mmol/L) at 1/4/2019  3:59 AM  Total Bilirubin: 1.5 mg/dL at 1/4/2019  3:59 AM  INR(ratio): 1.7 at 1/4/2019  3:59 AM  Age: 78 years    Significant Labs:  CBC:   Recent Labs   Lab 01/04/19 0359   WBC 5.39   RBC 3.83*   HGB 9.5*   HCT 29.9*   PLT 86*     CMP:   Recent Labs   Lab 01/04/19 0359   *   CALCIUM 8.5*   ALBUMIN 3.3*   PROT 5.6*      K 3.0*   CO2 29      BUN 26*   CREATININE 1.6*   ALKPHOS 158*   ALT 18   AST 34   BILITOT 1.5*     Coagulation:   Recent Labs   Lab 01/04/19  0359   INR 1.7*         Assessment/Plan:     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  No evidence of residual tumor seen on most recent abdominal scan.     Recommendations:  - Continue diuretics for volume overload  - Workup of YEMI, consider HRS if no improvement with albumin         Thank you for your consult. I will follow-up with patient. Please contact us if you have any additional questions.    Hill Rivera MD  Hepatology  [facility]

## 2019-01-04 NOTE — CONSULTS
Food & Nutrition  Education    Diet Education: Low Na+  Time Spent:  15min  Learners:Pt/Wife      Nutrition Education provided with handouts: Low Na diet handout      Comments: pt w/ wife at bed side. Discussed low Na diet and provided w/ handouts. Pt/wife V/U and asked no f/u questions  Dietitian's contact information provided.     LAB:     Lab Results   Component Value Date    ALT 18 01/04/2019    AST 34 01/04/2019    ALKPHOS 158 (H) 01/04/2019    BILITOT 1.5 (H) 01/04/2019   Na-139    Follow-Up: 1/wk    Please Re-consult as needed    Thanks!

## 2019-01-04 NOTE — PT/OT/SLP EVAL
Occupational Therapy   Evaluation    Name: Hossein Sanchez Sr.  MRN: [id number]  Admitting Diagnosis:  Anasarca      Recommendations:     Discharge Recommendations: (home )  Discharge Equipment Recommendations:  none  Barriers to discharge:  None    History:     Occupational Profile:  Living Environment: Pt lives with wife in 1 story house with no steps   Previous level of function: Pt with independent prior level of function   Equipment Used at Home:   rolling walker , shower seat  Assistance upon Discharge: spouse barbara to assist     Past Medical History:   Diagnosis Date    Anemia     Cancer     Diabetes mellitus     Encounter for blood transfusion     GI bleed 8/6/2003    Hypertension     Liver carcinoma     Obstructive sleep apnea on CPAP     Sleep apnea 12/18/15    Stroke 2014    Stroke 7/16/14    TIA (transient ischemic attack) 2/11/15       Past Surgical History:   Procedure Laterality Date    APPENDECTOMY  teen    [id number] N/A 4/8/2016    Performed by [facility] Diagnostic Provider at [facility] OR [address] [place] [address]    BIOPSY-LIVER-LAPAROSCOPIC N/A 4/25/2016    Performed by Narinder Chavez MD at [facility] OR [address] [place] [address]    BONE MARROW BIOPSY      CATARACT EXTRACTION  12/10/11    CHOLECYSTECTOMY      CHOLECYSTECTOMY-LAPAROSCOPIC  4/25/2016    Performed by Narinder Chavez MD at [facility] OR [address] [place] [address]    COLONOSCOPY  12/21/15    EMBOLIZATION, ARTERY, LIVER, FOR SELECTIVE INTERNAL RADIATION THERAPY USING YTTRIUM-90 MICROSPHERES N/A 9/25/2018    Per[facility]    EMBOLIZATION, BLOOD VESSEL N/A 8/8/2018    [facility] by [facility]    Embolization, Yttrium Therapy N/A 7/18/2018    [facility]    Embolization, Yttrium Therapy N/A 3/29/2018    Per[facility] by [facility]    Embolization, Yttrium Therapy N/A 3/8/2018    Performed by [facility] Diagnostic Provider at [facility] OR [address] [place] [address]     JOINT REPLACEMENT Bilateral     knees    KNEE SURGERY Right replacement    KNEE SURGERY Left     TONSILLECTOMY         Subjective     Chief Complaint: no complaints   Patient/Family Comments/goals: return to home     Pain/Comfort:  · Pain Rating 1: 0/10    Patients cultural, spiritual, Lutheran conflicts given the current situation: no    Objective:     Communicated with: Rn prior to session.  Patient found with: all lines intact and   upon OT entry to room.    General Precautions: Standard, fall   Orthopedic Precautions:N/A   Braces: N/A     Occupational Performance:    Bed Mobility:    · Pt independent wit bed mobility     Functional Mobility/Transfers:  · Functional Mobility:  Pt independent with mobility     Activities of Daily Living:  · Pt abl eot complete self care performance without assistance     Cognitive/Visual Perceptual:  Cognitive/Psychosocial Skills:     -       Oriented to: Person, Place, Time and Situation   -       Follows Commands/attention:Follows two-step commands  -       Communication: clear/fluent  -       Memory: No Deficits noted  -       Safety awareness/insight to disability: intact   -       Mood/Affect/Coping skills/emotional control: Appropriate to situation    Physical Exam:  Upper Extremity Range of Motion:     -       Right Upper Extremity: WFL  -       Left Upper Extremity: WFL  Upper Extremity Strength:    -       Right Upper Extremity: WFL  -       Left Upper Extremity: WFL    AMPAC 6 Click ADL:  AMPAC Total Score: 24    Treatment & Education:  Evaluation complete.  Pt educated on safety, role of OT, importance of increased participation in self care for gains , expectations for participation, expectations for gains, POC, energy conservation, caregiver strain. White board updated.     Education:    Patient left up in chair with all lines intact    Assessment:     Hossein Simmonsrichelle Herrera is a 78 y.o. male with a medical diagnosis of Anasarca.  He presents with the following  "performance deficits affecting function: impaired endurance.      Rehab Prognosis: Good; patient would benefit from acute skilled OT services to address these deficits and reach maximum level of function.         Clinical Decision Makin.  OT Low:  "Pt evaluation falls under low complexity for evaluation coding due to performance deficits noted in 1-3 areas as stated above and 0 co-morbities affecting current functional status. Data obtained from problem focused assessments. No modifications or assistance was required for completion of evaluation. Only brief occupational profile and history review completed."     Plan:     Patient to be seen   to address the above listed problems via    · Plan of Care Expires:    · Plan of Care Reviewed with: patient, spouse    This Plan of care has been discussed with the patient who was involved in its development and understands and is in agreement with the identified goals and treatment plan    GOALS:   Multidisciplinary Problems     Occupational Therapy Goals     Not on file          Multidisciplinary Problems (Resolved)        Problem: Occupational Therapy Goal    Goal Priority Disciplines Outcome Interventions   Occupational Therapy Goal   (Resolved)     OT, PT/OT Outcome(s) achieved                    Time Tracking:     OT Date of Treatment: 19  OT Start Time: 1012  OT Stop Time: 1022  OT Total Time (min): 10 min    Billable Minutes:Evaluation 10    Angi Colbert OT  2019    "

## 2019-01-04 NOTE — NURSING
Received report from charge nurse and patient alert and wife at bedside. Denied pain . Assessment on going.

## 2019-01-04 NOTE — PROGRESS NOTES
Progress Note   Hospital Medicine         Patient Name: Hossein Sanchez Sr.  MRN:  [id number]  Hospital Medicine Team: Networked reference to record PCT  Luis Fernando Navarro MD  Date of Admission:  1/2/2019     Length of Stay:  LOS: 1 day   Expected Discharge Date: 1/4/2019  Principal Problem:  Anasarca       Subjective:     Interval History/Overnight Events:  Patient is significantly volume overloaded on exam; went for IR paracentesis with 4.9L removed; will start patient on lasix 80 mg IV TID and strict I/O and daily weight; states he has gained 30 pounds in 2 months; TTE pending;     Review of Systems   Constitutional: Negative for chills, fatigue, fever.   HENT: Negative for sore throat, trouble swallowing.    Eyes: Negative for photophobia, visual disturbance.   Respiratory: Negative for cough, shortness of breath.    Cardiovascular: Negative for chest pain, palpitations, leg swelling.   Gastrointestinal: Negative for abdominal pain, constipation, diarrhea, nausea, vomiting.   Endocrine: Negative for cold intolerance, heat intolerance.   Genitourinary: Negative for dysuria, frequency.   Musculoskeletal: Negative for arthralgias, myalgias.   Skin: Negative for rash, wound, erythema   Neurological: Negative for dizziness, syncope, weakness, light-headedness.   Psychiatric/Behavioral: Negative for confusion, hallucinations, anxiety  All other systems reviewed and are negative.    Objective:     Temp:  [98 °F (36.7 °C)-98.7 °F (37.1 °C)]   Pulse:  [68-95]   Resp:  [16-19]   BP: (128-152)/(67-92)   SpO2:  [94 %-98 %]       Physical Exam:  Constitutional: appears weak and ill  Head: Normocephalic and atraumatic.   Mouth/Throat: Oropharynx is clear and moist.   Eyes: EOM are normal. Pupils are equal, round, and reactive to light. No scleral icterus.   Neck: Normal range of motion. Neck supple.   Cardiovascular: Normal rate and regular rhythm.  No murmur heard.  Pulmonary/Chest: Effort normal and breath sounds normal. No  respiratory distress. No wheezes, rales, or rhonchi  Abdominal: Soft. Bowel sounds are normal.  Distended, no TTP  Musculoskeletal: Normal range of motion. Plus 3 pitting edema.   Neurological: Alert and oriented to person, place, and time.   Skin: Skin is warm and dry.   Psychiatric: Normal mood and affect. Behavior is normal.     Recent Labs   Lab 01/02/19  1200 01/03/19  0515   WBC 9.38 5.51   HGB 11.4* 9.2*   HCT 37.2* 30.1*   * 102*     Recent Labs   Lab 01/02/19  1200 01/03/19  0514    139   K 4.7 4.3    102   CO2 23 29   BUN 25* 26*   CREATININE 1.7* 1.7*   * 116*   CALCIUM 9.2 8.8   MG  --  1.3*   PHOS  --  2.9     Recent Labs   Lab 01/02/19  1200 01/03/19  0514 01/03/19  0515   ALKPHOS 203* 162*  --    ALT 22 18  --    AST 36 31  --    ALBUMIN 2.6* 3.1*  --    PROT 6.2 5.7*  --    BILITOT 1.5* 1.7*  --    INR 1.5*  --  1.5*     Recent Labs   Lab 01/02/19  2303 01/03/19  0735 01/03/19  1201 01/03/19  1714   POCTGLUCOSE 119* 101 148* 115*        apixaban  5 mg Oral BID    cyanocobalamin  1,000 mcg Oral Daily    finasteride  5 mg Oral Daily    furosemide  80 mg Intravenous TID    iron polysaccharides  150 mg Oral BID    metoprolol succinate  25 mg Oral BID    pravastatin  10 mg Oral Daily    tamsulosin  0.4 mg Oral Daily    verapamil  300 mg Oral QHS    vitamin D  2,000 Units Oral Daily       Assessment and Plan     Mr. Hossein Sanchez Sr. is a 78 y.o. male who presented to [facility] on 1/2/2019 with     Hospital Course:    Mr. Hossein Sanchez Sr. was admitted to Hospital Medicine for management of     Active Hospital Problems    Diagnosis  POA    *Anasarca [R60.1]  Yes    Decompensated hepatic cirrhosis [K72.90]  Yes    Morbid obesity [E66.01]  Yes    Anemia of chronic disease [D63.8]  Yes    Thrombocytopenia [D69.6]  Yes    Acute kidney injury [N17.9]  Yes    Vitamin D deficiency [E55.9]  Yes    Iron deficiency anemia [D50.9]  Yes    Benign prostatic hyperplasia with  urinary hesitancy [N40.1, R39.11]  Yes    Type 2 diabetes mellitus without complication, without long-term current use of insulin [E11.9]  Yes    Chronic a-fib [I48.2]  Yes    HCC (hepatocellular carcinoma) [C22.0]  Yes    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       # YEMI  - Cr 1.7 with normal baseline; likely due to fluid overload;  - will check urine P:C ratio and renal U/S    # Anasarca  - start IV lasix 80 mg IV TID; strict I/O and daily weights; TTE pending    # Decompensated DRISCOLL Cirrhosis with ascites  # HCC  MELD-Na score: 18 at 1/3/2019  5:15 AM  MELD score: 18 at 1/3/2019  5:15 AM  Calculated from:  Serum Creatinine: 1.7 mg/dL at 1/3/2019  5:14 AM  Serum Sodium: 139 mmol/L (Rounded to 137 mmol/L) at 1/3/2019  5:14 AM  Total Bilirubin: 1.7 mg/dL at 1/3/2019  5:14 AM  INR(ratio): 1.5 at 1/3/2019  5:15 AM  Age: 78 years  - patient's HCC seems to be in remission  - went for IR paracentesis with 4.9L removed  - hepatology consulted  - Liver U/S pending    # Essential HTN  - cont home BP meds    # Chronic Atrial fibrillation  - cont lopressor and eliquis    # DM2 without complication   - cont insulin  - Hba1c 5.3    # Anemia of chronic disease  # Thrombocytopenia  - monitor    # Hypomagnesemia  - replace    # Morbid Obesity  Body mass index is 46.36 kg/m².  dietary          Diet:  Low sodium  GI PPx:    DVT PPx:    Goals of Care:  full      Disposition:  Over the weekend    Luis Fernando Navarro MD  Medical Director [facility]  Spectra:  [phone]  Pager: [phone]

## 2019-01-04 NOTE — PLAN OF CARE
Problem: Occupational Therapy Goal  Goal: Occupational Therapy Goal  Outcome: Outcome(s) achieved Date Met: 01/04/19  Evaluation complete.  Pt without skilled OT need.  DC OT  Angi Colbert, OT  1/4/2019

## 2019-01-04 NOTE — PLAN OF CARE
Problem: Adult Inpatient Plan of Care  Goal: Plan of Care Review  Outcome: Ongoing (interventions implemented as appropriate)  Pt remains free of falls and injury. Pt makes statement of no pain. Ambulates within room. Bed low and locked, Call light within reach.

## 2019-01-04 NOTE — SUBJECTIVE & OBJECTIVE
Interval History: Underwent para yesterday with 4.9L removed, no SBP. Feels much better today. Still with volume overload.    Current Facility-Administered Medications   Medication    acetaminophen tablet 650 mg    apixaban tablet 5 mg    cyanocobalamin tablet 1,000 mcg    dextrose 50% injection 12.5 g    dextrose 50% injection 25 g    finasteride tablet 5 mg    furosemide injection 80 mg    glucagon (human recombinant) injection 1 mg    glucose chewable tablet 16 g    glucose chewable tablet 24 g    insulin aspart U-100 pen 0-5 Units    iron polysaccharides capsule 150 mg    magnesium sulfate 2 g/50 ml IVPB    metoprolol succinate (TOPROL-XL) 24 hr tablet 25 mg    ondansetron disintegrating tablet 8 mg    pravastatin tablet 10 mg    sodium chloride 0.9% flush 5 mL    tamsulosin 24 hr capsule 0.4 mg    verapamil CR tablet 300 mg    vitamin D 1000 units tablet 2,000 Units       Objective:     Vital Signs (Most Recent):  Temp: 97.9 °F (36.6 °C) (01/04/19 0848)  Pulse: 62 (01/04/19 0848)  Resp: 18 (01/04/19 0848)  BP: 137/60 (01/04/19 0848)  SpO2: 97 % (01/04/19 0848) Vital Signs (24h Range):  Temp:  [97.5 °F (36.4 °C)-98.7 °F (37.1 °C)] 97.9 °F (36.6 °C)  Pulse:  [58-79] 62  Resp:  [16-18] 18  SpO2:  [93 %-97 %] 97 %  BP: (127-152)/(60-92) 137/60     Weight: 117.5 kg (259 lb) (01/04/19 0603)  Body mass index is 44.46 kg/m².    Physical Exam   Constitutional: He is oriented to person, place, and time. No distress.   Eyes: No scleral icterus.   Cardiovascular: Normal rate and regular rhythm.   Pulmonary/Chest: Effort normal and breath sounds normal.   Abdominal: There is no tenderness.   Less distended   Musculoskeletal: He exhibits edema. He exhibits no deformity.   Neurological: He is alert and oriented to person, place, and time.   Skin: Skin is warm and dry.   Psychiatric: He has a normal mood and affect.   Vitals reviewed.      MELD-Na score: 18 at 1/4/2019  3:59 AM  MELD score: 18 at 1/4/2019   3:59 AM  Calculated from:  Serum Creatinine: 1.6 mg/dL at 1/4/2019  3:59 AM  Serum Sodium: 139 mmol/L (Rounded to 137 mmol/L) at 1/4/2019  3:59 AM  Total Bilirubin: 1.5 mg/dL at 1/4/2019  3:59 AM  INR(ratio): 1.7 at 1/4/2019  3:59 AM  Age: 78 years    Significant Labs:  CBC:   Recent Labs   Lab 01/04/19 0359   WBC 5.39   RBC 3.83*   HGB 9.5*   HCT 29.9*   PLT 86*     CMP:   Recent Labs   Lab 01/04/19 0359   *   CALCIUM 8.5*   ALBUMIN 3.3*   PROT 5.6*      K 3.0*   CO2 29      BUN 26*   CREATININE 1.6*   ALKPHOS 158*   ALT 18   AST 34   BILITOT 1.5*     Coagulation:   Recent Labs   Lab 01/04/19 0359   INR 1.7*

## 2019-01-04 NOTE — ASSESSMENT & PLAN NOTE
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 No evidence of residual tumor seen on most recent abdominal scan.     Recommendations:  - Continue diuretics for volume overload  - Workup of YEMI, consider HRS if no improvement with albumin

## 2019-01-05 NOTE — PROGRESS NOTES
[facility]  Hepatology  Progress Note    Patient Name: Hossein Sanchez Sr.  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 Patient was seen by Dr. Dumont yesterday in clinic. Patient reports worsening abdominal distention that started about three months ago. He denies any previous history of large volume paracentesis. He is on diuretics at home and is compliant. He otherwise feels okay except for abdominal distention. On routine labs checked he was noted to have an elevated Cr of 1.7 off his baseline of 0.9. He denies any alcohol use. On his most recent imaging on MR abdomen from 12/21 no residual tumor was seen.      Interval History: Responding well to the diuretics. TTE done yesterday showed enlarged RV and increased PA pressure. Still volume overloaded.    Current Facility-Administered Medications   Medication    acetaminophen tablet 650 mg    amoxicillin-clavulanate 500-125mg per tablet 500 mg    apixaban tablet 5 mg    cyanocobalamin tablet 1,000 mcg    dextrose 50% injection 12.5 g    dextrose 50% injection 25 g    finasteride tablet 5 mg    furosemide injection 80 mg    glucagon (human recombinant) injection 1 mg    glucose chewable tablet 16 g    glucose chewable tablet 24 g    insulin aspart U-100 pen 0-5 Units    iron polysaccharides capsule 150 mg    magnesium sulfate 3 g in dextrose 5 % 250 mL IVPB    metoprolol tartrate (LOPRESSOR) tablet 25 mg    ondansetron disintegrating tablet 8 mg    potassium bicarbonate disintegrating tablet 50 mEq    pravastatin tablet 10 mg     sodium chloride 0.9% flush 5 mL    tamsulosin 24 hr capsule 0.4 mg    verapamil CR tablet 300 mg    vitamin D 1000 units tablet 2,000 Units       Objective:     Vital Signs (Most Recent):  Temp: 98.8 °F (37.1 °C) (01/05/19 0749)  Pulse: 61 (01/05/19 0749)  Resp: 18 (01/05/19 0749)  BP: 139/66 (01/05/19 0749)  SpO2: 96 % (01/05/19 0749) Vital Signs (24h Range):  Temp:  [97.4 °F (36.3 °C)-99 °F (37.2 °C)] 98.8 °F (37.1 °C)  Pulse:  [] 61  Resp:  [18-19] 18  SpO2:  [94 %-100 %] 96 %  BP: (120-157)/(60-74) 139/66     Weight: 115.2 kg (253 lb 15.5 oz) (01/05/19 0400)  Body mass index is 43.59 kg/m².    Physical Exam   Constitutional: He is oriented to person, place, and time. No distress.   Eyes: No scleral icterus.   Cardiovascular: Normal rate and regular rhythm.   Pulmonary/Chest: Effort normal and breath sounds normal.   Abdominal: Soft. There is no tenderness.   Less distended   Musculoskeletal: He exhibits edema. He exhibits no deformity.   Neurological: He is alert and oriented to person, place, and time.   Skin: Skin is warm and dry.   Psychiatric: He has a normal mood and affect.   Vitals reviewed.      MELD-Na score: 18 at 1/5/2019  3:29 AM  MELD score: 18 at 1/5/2019  3:29 AM  Calculated from:  Serum Creatinine: 1.6 mg/dL at 1/5/2019  3:29 AM  Serum Sodium: 142 mmol/L (Rounded to 137 mmol/L) at 1/5/2019  3:29 AM  Total Bilirubin: 1.3 mg/dL at 1/5/2019  3:29 AM  INR(ratio): 1.7 at 1/5/2019  3:29 AM  Age: 78 years    Significant Labs:  CBC:   Recent Labs   Lab 01/05/19  0329   WBC 6.94   RBC 4.03*   HGB 10.1*   HCT 32.4*   PLT 97*     CMP:   Recent Labs   Lab 01/05/19 0329   *   CALCIUM 8.4*   ALBUMIN 3.1*   PROT 5.7*      K 3.0*   CO2 33*   CL 98   BUN 25*   CREATININE 1.6*   ALKPHOS 159*   ALT 19   AST 40   BILITOT 1.3*     Coagulation:   Recent Labs   Lab 01/05/19 0329   INR 1.7*         Assessment/Plan:     Liver cirrhosis secondary to DRISCOLL    77 yo M with DRISCOLL cirrhosis c/b HCC  (treated with Y-90), 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 No evidence of residual tumor seen on most recent abdominal scan.     Recommendations:  - Continue diuretics for volume overload  - Restart aldactone given low potassium  - If PA pressure doesn't improve, consider HTS consult for elevated PA pressure and enlarged RV           Thank you for your consult. I will follow-up with patient. Please contact us if you have any additional questions.    Hill Rivera MD  Hepatology  [facility]

## 2019-01-05 NOTE — PROGRESS NOTES
Progress Note   Hospital Medicine         Patient Name: Hossein Sanchez Sr.  MRN:  [id number]  Hospital Medicine Team: Networked reference to record PCT  Luis Fernando Navarro MD  Date of Admission:  1/2/2019     Length of Stay:  LOS: 3 days   Expected Discharge Date: 1/6/2019  Principal Problem:  Anasarca       Subjective:     Interval History/Overnight Events:  Patient doing well today, lost 6 pounds and put out 3.7L; HR improved; will add aldactone today to help with diuresis and patient's hypokalemia     Review of Systems   Constitutional: Negative for chills, fatigue, fever.   HENT: Negative for sore throat, trouble swallowing.    Eyes: Negative for photophobia, visual disturbance.   Respiratory: Negative for cough, shortness of breath.    Cardiovascular: Negative for chest pain, palpitations, leg swelling.   Gastrointestinal: Negative for abdominal pain, constipation, diarrhea, nausea, vomiting.   Endocrine: Negative for cold intolerance, heat intolerance.   Genitourinary: Negative for dysuria, frequency.   Musculoskeletal: Negative for arthralgias, myalgias.   Skin: Negative for rash, wound, erythema   Neurological: Negative for dizziness, syncope, weakness, light-headedness.   Psychiatric/Behavioral: Negative for confusion, hallucinations, anxiety  All other systems reviewed and are negative.    Objective:     Temp:  [97.4 °F (36.3 °C)-99 °F (37.2 °C)]   Pulse:  []   Resp:  [18-19]   BP: (120-161)/(60-74)   SpO2:  [94 %-100 %]       Physical Exam:  Constitutional: appears weak and ill  Head: Normocephalic and atraumatic.   Mouth/Throat: Oropharynx is clear and moist.   Eyes: EOM are normal. Pupils are equal, round, and reactive to light. No scleral icterus.   Neck: Normal range of motion. Neck supple.   Cardiovascular: Normal rate and regular rhythm.  No murmur heard.  Pulmonary/Chest: Effort normal and breath sounds normal. No respiratory distress. No wheezes, rales, or rhonchi  Abdominal: Soft. Bowel sounds are  normal.  Distended, no TTP  Musculoskeletal: Normal range of motion. Plus 3 pitting edema.   Neurological: Alert and oriented to person, place, and time.   Skin: Skin is warm and dry.   Psychiatric: Normal mood and affect. Behavior is normal.     Recent Labs   Lab 01/02/19  1200 01/03/19  0515 01/04/19  0359 01/05/19  0329   WBC 9.38 5.51 5.39 6.94   HGB 11.4* 9.2* 9.5* 10.1*   HCT 37.2* 30.1* 29.9* 32.4*   * 102* 86* 97*     Recent Labs   Lab 01/03/19  0514 01/04/19  0359 01/05/19  0329    139 142   K 4.3 3.0* 3.0*    100 98   CO2 29 29 33*   BUN 26* 26* 25*   CREATININE 1.7* 1.6* 1.6*   * 124* 126*   CALCIUM 8.8 8.5* 8.4*   MG 1.3* 1.2* 1.1*   PHOS 2.9 2.6* 2.8     Recent Labs   Lab 01/03/19  0514 01/03/19  0515 01/04/19  0359 01/05/19  0329   ALKPHOS 162*  --  158* 159*   ALT 18  --  18 19   AST 31  --  34 40   ALBUMIN 3.1*  --  3.3* 3.1*   PROT 5.7*  --  5.6* 5.7*   BILITOT 1.7*  --  1.5* 1.3*   INR  --  1.5* 1.7* 1.7*     Recent Labs   Lab 01/03/19  1714 01/03/19  2157 01/04/19  0854 01/04/19  2136 01/05/19  0748 01/05/19  1152   POCTGLUCOSE 115* 156* 110 152* 116* 137*        amoxicillin-clavulanate 500-125mg  1 tablet Oral BID    apixaban  5 mg Oral BID    cyanocobalamin  1,000 mcg Oral Daily    finasteride  5 mg Oral Daily    furosemide  80 mg Intravenous TID    iron polysaccharides  150 mg Oral BID    metoprolol tartrate  25 mg Oral TID    pravastatin  10 mg Oral Daily    spironolactone  100 mg Oral Daily    tamsulosin  0.4 mg Oral Daily    verapamil  300 mg Oral QHS    vitamin D  2,000 Units Oral Daily       Assessment and Plan     Mr. Hossein Sanchez Sr. is a 78 y.o. male who presented to [facility] on 1/2/2019 with     Hospital Course:    Mr. Hossein Sanchez Sr. was admitted to Hospital Medicine for management of     Active Hospital Problems    Diagnosis  POA    *Anasarca [R60.1]  Yes    Acute cystitis without hematuria [N30.00]  Yes    Enterococcus UTI [N39.0, B95.2]   Yes    Acute diastolic heart failure [I50.31]  Yes    Decompensated hepatic cirrhosis [K72.90]  Yes    Morbid obesity [E66.01]  Yes    Anemia of chronic disease [D63.8]  Yes    Thrombocytopenia [D69.6]  Yes    Acute kidney injury [N17.9]  Yes    Vitamin D deficiency [E55.9]  Yes    Iron deficiency anemia [D50.9]  Yes    Benign prostatic hyperplasia with urinary hesitancy [N40.1, R39.11]  Yes    Type 2 diabetes mellitus without complication, without long-term current use of insulin [E11.9]  Yes    Chronic a-fib [I48.2]  Yes    HCC (hepatocellular carcinoma) [C22.0]  Yes    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       # YEMI  - Cr 1.7 on admit with normal baseline; likely due to fluid overload; improved to 1.6 today  - renal U/S and urine lytes reviewed     # Acute diastolic heart failure  # Anasarca  - TTE shows diastolic heart failure  - on lasix 80 mg IV TID and put out 3.7L continue and adding aldactone 200 mg daily    # Decompensated DRISCOLL Cirrhosis with ascites  # HCC  MELD-Na score: 18 at 1/5/2019  3:29 AM  MELD score: 18 at 1/5/2019  3:29 AM  Calculated from:  Serum Creatinine: 1.6 mg/dL at 1/5/2019  3:29 AM  Serum Sodium: 142 mmol/L (Rounded to 137 mmol/L) at 1/5/2019  3:29 AM  Total Bilirubin: 1.3 mg/dL at 1/5/2019  3:29 AM  INR(ratio): 1.7 at 1/5/2019  3:29 AM  Age: 78 years  - HCC has been treated;  - went for IR paracentesis with 4.9L removed  - hepatology consulted  - Liver U/S shows multiple masses; had recent MRI abdomen    # Essential HTN  - cont home BP meds    #  Atrial fibrillation with RVR  - lopressor to 25 mg PO TID and cont eliquis; RVR resolved    # DM2 without complication   - cont insulin  - Hba1c 5.3    # Acute cystitis without hematuria due to enterococcus   - starting patient on augmentin, waiting on sensitivities     # Anemia of chronic disease  # Thrombocytopenia  # Coagulopathy  -DIC  labs  -    # Hypomagnesemia/Hypokalemia  - replace    # Morbid Obesity  Body mass index is 43.59 kg/m².  dietary          Diet:  Low sodium  GI PPx:    DVT PPx:    Goals of Care:  full      Disposition:  Monday     Luis Fernando Navarro MD  Medical Director [facility]  Spectra:  [phone]  Pager: [phone]

## 2019-01-05 NOTE — ASSESSMENT & PLAN NOTE
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 No evidence of residual tumor seen on most recent abdominal scan.     Recommendations:  - Continue diuretics for volume overload  - Restart aldactone given low potassium  - If PA pressure doesn't improve, consider HTS consult for elevated PA pressure and enlarged RV

## 2019-01-05 NOTE — SUBJECTIVE & OBJECTIVE
Interval History: Responding well to the diuretics. TTE done yesterday showed enlarged RV and increased PA pressure. Still volume overloaded.    Current Facility-Administered Medications   Medication    acetaminophen tablet 650 mg    amoxicillin-clavulanate 500-125mg per tablet 500 mg    apixaban tablet 5 mg    cyanocobalamin tablet 1,000 mcg    dextrose 50% injection 12.5 g    dextrose 50% injection 25 g    finasteride tablet 5 mg    furosemide injection 80 mg    glucagon (human recombinant) injection 1 mg    glucose chewable tablet 16 g    glucose chewable tablet 24 g    insulin aspart U-100 pen 0-5 Units    iron polysaccharides capsule 150 mg    magnesium sulfate 3 g in dextrose 5 % 250 mL IVPB    metoprolol tartrate (LOPRESSOR) tablet 25 mg    ondansetron disintegrating tablet 8 mg    potassium bicarbonate disintegrating tablet 50 mEq    pravastatin tablet 10 mg    sodium chloride 0.9% flush 5 mL    tamsulosin 24 hr capsule 0.4 mg    verapamil CR tablet 300 mg    vitamin D 1000 units tablet 2,000 Units       Objective:     Vital Signs (Most Recent):  Temp: 98.8 °F (37.1 °C) (01/05/19 0749)  Pulse: 61 (01/05/19 0749)  Resp: 18 (01/05/19 0749)  BP: 139/66 (01/05/19 0749)  SpO2: 96 % (01/05/19 0749) Vital Signs (24h Range):  Temp:  [97.4 °F (36.3 °C)-99 °F (37.2 °C)] 98.8 °F (37.1 °C)  Pulse:  [] 61  Resp:  [18-19] 18  SpO2:  [94 %-100 %] 96 %  BP: (120-157)/(60-74) 139/66     Weight: 115.2 kg (253 lb 15.5 oz) (01/05/19 0400)  Body mass index is 43.59 kg/m².    Physical Exam   Constitutional: He is oriented to person, place, and time. No distress.   Eyes: No scleral icterus.   Cardiovascular: Normal rate and regular rhythm.   Pulmonary/Chest: Effort normal and breath sounds normal.   Abdominal: Soft. There is no tenderness.   Less distended   Musculoskeletal: He exhibits edema. He exhibits no deformity.   Neurological: He is alert and oriented to person, place, and time.   Skin: Skin is  warm and dry.   Psychiatric: He has a normal mood and affect.   Vitals reviewed.      MELD-Na score: 18 at 1/5/2019  3:29 AM  MELD score: 18 at 1/5/2019  3:29 AM  Calculated from:  Serum Creatinine: 1.6 mg/dL at 1/5/2019  3:29 AM  Serum Sodium: 142 mmol/L (Rounded to 137 mmol/L) at 1/5/2019  3:29 AM  Total Bilirubin: 1.3 mg/dL at 1/5/2019  3:29 AM  INR(ratio): 1.7 at 1/5/2019  3:29 AM  Age: 78 years    Significant Labs:  CBC:   Recent Labs   Lab 01/05/19 0329   WBC 6.94   RBC 4.03*   HGB 10.1*   HCT 32.4*   PLT 97*     CMP:   Recent Labs   Lab 01/05/19 0329   *   CALCIUM 8.4*   ALBUMIN 3.1*   PROT 5.7*      K 3.0*   CO2 33*   CL 98   BUN 25*   CREATININE 1.6*   ALKPHOS 159*   ALT 19   AST 40   BILITOT 1.3*     Coagulation:   Recent Labs   Lab 01/05/19 0329   INR 1.7*

## 2019-01-05 NOTE — PROGRESS NOTES
Progress Note   Hospital Medicine         Patient Name: Hossein Sanchez Sr.  MRN:  [id number]  Hospital Medicine Team: Networked reference to record PCT  Luis Fernando Navarro MD  Date of Admission:  1/2/2019     Length of Stay:  LOS: 2 days   Expected Discharge Date: 1/6/2019  Principal Problem:  Anasarca       Subjective:     Interval History/Overnight Events:  Patient lost 11 pounds over night; put out 3 L UOP; patient growing enterococcus in urine cultures with positive U/A, starting on augmentin; Cr improved, will continue IV diuresis; patient states he is feeling better today;   - 2d echo shows diastolic heart failure     Review of Systems   Constitutional: Negative for chills, fatigue, fever.   HENT: Negative for sore throat, trouble swallowing.    Eyes: Negative for photophobia, visual disturbance.   Respiratory: Negative for cough, shortness of breath.    Cardiovascular: Negative for chest pain, palpitations, leg swelling.   Gastrointestinal: Negative for abdominal pain, constipation, diarrhea, nausea, vomiting.   Endocrine: Negative for cold intolerance, heat intolerance.   Genitourinary: Negative for dysuria, frequency.   Musculoskeletal: Negative for arthralgias, myalgias.   Skin: Negative for rash, wound, erythema   Neurological: Negative for dizziness, syncope, weakness, light-headedness.   Psychiatric/Behavioral: Negative for confusion, hallucinations, anxiety  All other systems reviewed and are negative.    Objective:     Temp:  [97.5 °F (36.4 °C)-98.7 °F (37.1 °C)]   Pulse:  []   Resp:  [16-18]   BP: (124-144)/(60-92)   SpO2:  [93 %-100 %]       Physical Exam:  Constitutional: appears weak and ill  Head: Normocephalic and atraumatic.   Mouth/Throat: Oropharynx is clear and moist.   Eyes: EOM are normal. Pupils are equal, round, and reactive to light. No scleral icterus.   Neck: Normal range of motion. Neck supple.   Cardiovascular: Normal rate and regular rhythm.  No murmur heard.  Pulmonary/Chest:  Effort normal and breath sounds normal. No respiratory distress. No wheezes, rales, or rhonchi  Abdominal: Soft. Bowel sounds are normal.  Distended, no TTP  Musculoskeletal: Normal range of motion. Plus 3 pitting edema.   Neurological: Alert and oriented to person, place, and time.   Skin: Skin is warm and dry.   Psychiatric: Normal mood and affect. Behavior is normal.     Recent Labs   Lab 01/02/19  1200 01/03/19  0515 01/04/19  0359   WBC 9.38 5.51 5.39   HGB 11.4* 9.2* 9.5*   HCT 37.2* 30.1* 29.9*   * 102* 86*     Recent Labs   Lab 01/02/19  1200 01/03/19  0514 01/04/19  0359    139 139   K 4.7 4.3 3.0*    102 100   CO2 23 29 29   BUN 25* 26* 26*   CREATININE 1.7* 1.7* 1.6*   * 116* 124*   CALCIUM 9.2 8.8 8.5*   MG  --  1.3* 1.2*   PHOS  --  2.9 2.6*     Recent Labs   Lab 01/02/19  1200 01/03/19  0514 01/03/19  0515 01/04/19  0359   ALKPHOS 203* 162*  --  158*   ALT 22 18  --  18   AST 36 31  --  34   ALBUMIN 2.6* 3.1*  --  3.3*   PROT 6.2 5.7*  --  5.6*   BILITOT 1.5* 1.7*  --  1.5*   INR 1.5*  --  1.5* 1.7*     Recent Labs   Lab 01/02/19  2303 01/03/19  0735 01/03/19  1201 01/03/19  1714 01/03/19  2157 01/04/19  0854   POCTGLUCOSE 119* 101 148* 115* 156* 110        apixaban  5 mg Oral BID    cyanocobalamin  1,000 mcg Oral Daily    finasteride  5 mg Oral Daily    furosemide  80 mg Intravenous TID    iron polysaccharides  150 mg Oral BID    metoprolol succinate  25 mg Oral BID    phytonadione ((AQUA-MEPHYTON) IVPB  10 mg Intravenous Daily    pravastatin  10 mg Oral Daily    tamsulosin  0.4 mg Oral Daily    verapamil  300 mg Oral QHS    vitamin D  2,000 Units Oral Daily       Assessment and Plan     Mr. Hossein Sanchez Sr. is a 78 y.o. male who presented to [facility] on 1/2/2019 with     Hospital Course:    Mr. Hossein Sanchez Sr. was admitted to Hospital Medicine for management of     Active Hospital Problems    Diagnosis  POA    *Anasarca [R60.1]  Yes    Acute cystitis without  hematuria [N30.00]  Yes    Enterococcus UTI [N39.0, B95.2]  Yes    Acute diastolic heart failure [I50.31]  Yes    Decompensated hepatic cirrhosis [K72.90]  Yes    Morbid obesity [E66.01]  Yes    Anemia of chronic disease [D63.8]  Yes    Thrombocytopenia [D69.6]  Yes    Acute kidney injury [N17.9]  Yes    Vitamin D deficiency [E55.9]  Yes    Iron deficiency anemia [D50.9]  Yes    Benign prostatic hyperplasia with urinary hesitancy [N40.1, R39.11]  Yes    Type 2 diabetes mellitus without complication, without long-term current use of insulin [E11.9]  Yes    Chronic a-fib [I48.2]  Yes    HCC (hepatocellular carcinoma) [C22.0]  Yes    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       # YEMI  - Cr 1.7 with normal baseline; likely due to fluid overload; improved to 1.6 today  - renal U/S and urine lytes reviewed     # Acute diastolic heart failure  # Anasarca  - TTE shows diastolic heart failure  - on lasix 80 mg IV TID and put out 3L, continue     # Decompensated DRISCOLL Cirrhosis with ascites  # HCC  MELD-Na score: 18 at 1/4/2019  3:59 AM  MELD score: 18 at 1/4/2019  3:59 AM  Calculated from:  Serum Creatinine: 1.6 mg/dL at 1/4/2019  3:59 AM  Serum Sodium: 139 mmol/L (Rounded to 137 mmol/L) at 1/4/2019  3:59 AM  Total Bilirubin: 1.5 mg/dL at 1/4/2019  3:59 AM  INR(ratio): 1.7 at 1/4/2019  3:59 AM  Age: 78 years  - HCC has been treated;  - went for IR paracentesis with 4.9L removed  - hepatology consulted  - Liver U/S shows multiple masses; will get MRI abdomen for further evaluation as patient has elevated AFP    # Essential HTN  - cont home BP meds    #  Atrial fibrillation with RVR  - switching lopressor to 25 mg PO TID and cont eliquis    # DM2 without complication   - cont insulin  - Hba1c 5.3    # Acute cystitis without hematuria due to enterococcus   - starting patient on augmentin, waiting on sensitivities     # Anemia of  chronic disease  # Thrombocytopenia  # Coagulopathy  -DIC labs  -    # Hypomagnesemia/Hypokalemia  - replace    # Morbid Obesity  Body mass index is 44.46 kg/m².  dietary          Diet:  Low sodium  GI PPx:    DVT PPx:    Goals of Care:  full      Disposition:  Over the weekend    Luis Fernando Navarro MD  Medical Director [facility]  Spectra:  [phone]  Pager: [phone]

## 2019-01-05 NOTE — PLAN OF CARE
Problem: Adult Inpatient Plan of Care  Goal: Plan of Care Review  Outcome: Ongoing (interventions implemented as appropriate)  Pt remains free of falls and injury. Pt makes no statement of pain. Pt's wife remains in room overnight. Bed low and locked, Call light within reach.

## 2019-01-05 NOTE — NURSING
Assumed care of patient and report received from Erin. Patient in bed alert and denied pain. Wife at bedside. tv on for stimulation. Assessment on going.

## 2019-01-06 NOTE — PROGRESS NOTES
Notified Dr. Navarro of patient's potassium level of 3.1. Dr. Navarro ordered to administer lasix and orders will be placed for potassium replacement. Will continue to monitor.

## 2019-01-06 NOTE — PROGRESS NOTES
Progress Note   Hospital Medicine         Patient Name: Hossein Sanchez Sr.  MRN:  [id number]  Hospital Medicine Team: Networked reference to record PCT  Luis Fernando Navarro MD  Date of Admission:  1/2/2019     Length of Stay:  LOS: 4 days   Expected Discharge Date: 1/7/2019  Principal Problem:  Anasarca       Subjective:     Interval History/Overnight Events:  Patient only put out 1.7L over night; will put patient on lasix drip 15 mg/hr and add metolazone as patient is still volume overloaded     Review of Systems   Constitutional: Negative for chills, fatigue, fever.   HENT: Negative for sore throat, trouble swallowing.    Eyes: Negative for photophobia, visual disturbance.   Respiratory: Negative for cough, shortness of breath.    Cardiovascular: Negative for chest pain, palpitations, leg swelling.   Gastrointestinal: Negative for abdominal pain, constipation, diarrhea, nausea, vomiting.   Endocrine: Negative for cold intolerance, heat intolerance.   Genitourinary: Negative for dysuria, frequency.   Musculoskeletal: Negative for arthralgias, myalgias.   Skin: Negative for rash, wound, erythema   Neurological: Negative for dizziness, syncope, weakness, light-headedness.   Psychiatric/Behavioral: Negative for confusion, hallucinations, anxiety  All other systems reviewed and are negative.    Objective:     Temp:  [97.5 °F (36.4 °C)-98.6 °F (37 °C)]   Pulse:  [52-74]   Resp:  [18]   BP: (106-163)/(53-90)   SpO2:  [92 %-96 %]       Physical Exam:  Constitutional: appears weak and ill  Head: Normocephalic and atraumatic.   Mouth/Throat: Oropharynx is clear and moist.   Eyes: EOM are normal. Pupils are equal, round, and reactive to light. No scleral icterus.   Neck: Normal range of motion. Neck supple.   Cardiovascular: Normal rate and regular rhythm.  No murmur heard.  Pulmonary/Chest: Effort normal and breath sounds normal. No respiratory distress. No wheezes, rales, or rhonchi  Abdominal: Soft. Bowel sounds are normal.   Distended, no TTP  Musculoskeletal: Normal range of motion. Plus 3 pitting edema.   Neurological: Alert and oriented to person, place, and time.   Skin: Skin is warm and dry.   Psychiatric: Normal mood and affect. Behavior is normal.     Recent Labs   Lab 01/02/19  1200 01/03/19  0515 01/04/19  0359 01/05/19 0329 01/06/19  0452   WBC 9.38 5.51 5.39 6.94 8.42   HGB 11.4* 9.2* 9.5* 10.1* 10.1*   HCT 37.2* 30.1* 29.9* 32.4* 32.5*   * 102* 86* 97* 105*     Recent Labs   Lab 01/04/19  0359 01/05/19 0329 01/06/19  0452    142 141   K 3.0* 3.0* 3.1*    98 95   CO2 29 33* 35*   BUN 26* 25* 26*   CREATININE 1.6* 1.6* 1.6*   * 126* 137*   CALCIUM 8.5* 8.4* 8.4*   MG 1.2* 1.1* 1.4*   PHOS 2.6* 2.8 2.9     Recent Labs   Lab 01/04/19  0359 01/05/19 0329 01/06/19  0452   ALKPHOS 158* 159* 172*   ALT 18 19 23   AST 34 40 46*   ALBUMIN 3.3* 3.1* 3.1*   PROT 5.6* 5.7* 5.8*   BILITOT 1.5* 1.3* 1.7*   INR 1.7* 1.7* 1.6*     Recent Labs   Lab 01/05/19  0748 01/05/19  1152 01/05/19  1735 01/05/19  2138 01/06/19  0748 01/06/19  1314   POCTGLUCOSE 116* 137* 171* 164* 126* 165*        amoxicillin-clavulanate 500-125mg  1 tablet Oral TID    apixaban  5 mg Oral BID    cyanocobalamin  1,000 mcg Oral Daily    finasteride  5 mg Oral Daily    iron polysaccharides  150 mg Oral BID    metOLazone  5 mg Oral Daily    metoprolol tartrate  25 mg Oral TID    pravastatin  10 mg Oral Daily    spironolactone  200 mg Oral Daily    tamsulosin  0.4 mg Oral Daily    verapamil  300 mg Oral QHS    vitamin D  2,000 Units Oral Daily       Assessment and Plan     Mr. Hossein Sanchez Sr. is a 78 y.o. male who presented to [facility] on 1/2/2019 with     Hospital Course:    Mr. Hossein Sanchez Sr. was admitted to Hospital Medicine for management of     Active Hospital Problems    Diagnosis  POA    *Anasarca [R60.1]  Yes    Acute cystitis without hematuria [N30.00]  Yes    Enterococcus UTI [N39.0, B95.2]  Yes    Acute diastolic  heart failure [I50.31]  Yes    Decompensated hepatic cirrhosis [K72.90]  Yes    Morbid obesity [E66.01]  Yes    Anemia of chronic disease [D63.8]  Yes    Thrombocytopenia [D69.6]  Yes    Acute kidney injury [N17.9]  Yes    Vitamin D deficiency [E55.9]  Yes    Iron deficiency anemia [D50.9]  Yes    Benign prostatic hyperplasia with urinary hesitancy [N40.1, R39.11]  Yes    Type 2 diabetes mellitus without complication, without long-term current use of insulin [E11.9]  Yes    Chronic a-fib [I48.2]  Yes    HCC (hepatocellular carcinoma) [C22.0]  Yes    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       # YEMI  - Cr 1.7 on admit with normal baseline; likely due to fluid overload; improved to 1.6 today  - renal U/S and urine lytes reviewed     # Acute diastolic heart failure  # Anasarca  - TTE shows diastolic heart failure  - only put out 1.7L, switching patient to lasix drip 15 mg/hr and adding metolazone     # Decompensated DRISCOLL Cirrhosis with ascites  # HCC  MELD-Na score: 18 at 1/6/2019  4:52 AM  MELD score: 18 at 1/6/2019  4:52 AM  Calculated from:  Serum Creatinine: 1.6 mg/dL at 1/6/2019  4:52 AM  Serum Sodium: 141 mmol/L (Rounded to 137 mmol/L) at 1/6/2019  4:52 AM  Total Bilirubin: 1.7 mg/dL at 1/6/2019  4:52 AM  INR(ratio): 1.6 at 1/6/2019  4:52 AM  Age: 78 years  - HCC has been treated;  - went for IR paracentesis with 4.9L removed  - hepatology consulted  - Liver U/S shows multiple masses; had recent MRI abdomen    # Essential HTN  - cont home BP meds    #  Atrial fibrillation with RVR  - lopressor to 25 mg PO TID and cont eliquis; RVR resolved    # DM2 without complication   - cont insulin  - Hba1c 5.3    # Acute cystitis without hematuria due to enterococcus   - patient on augmentin for a total of ten days    # Anemia of chronic disease  # Thrombocytopenia  # Coagulopathy  -DIC labs  -    # Hypomagnesemia/Hypokalemia  -  replace    # Morbid Obesity  Body mass index is 43.59 kg/m².  dietary          Diet:  Low sodium  GI PPx:    DVT PPx:    Goals of Care:  full      Disposition:  Tuesday     Luis Fernando Navarro MD  Medical Director [facility] Medicine  Spectra:  [phone]  Pager: [phone]

## 2019-01-06 NOTE — PROGRESS NOTES
Pharmacist Renal Dose Adjustment Note    Hossein Sanchez Sr. is a 78 y.o. male being treated with the medication amoxicillin-clavulanate     Patient Data:    Vital Signs (Most Recent):  Temp: 98 °F (36.7 °C) (01/06/19 1227)  Pulse: 64 (01/06/19 1227)  Resp: 18 (01/06/19 1227)  BP: 134/69 (01/06/19 1227)  SpO2: 96 % (01/06/19 1227) Vital Signs (72h Range):  Temp:  [97.4 °F (36.3 °C)-99 °F (37.2 °C)]   Pulse:  []   Resp:  [16-19]   BP: (106-161)/(53-92)   SpO2:  [92 %-100 %]      Recent Labs   Lab 01/04/19  0359 01/05/19  0329 01/06/19  0452   CREATININE 1.6* 1.6* 1.6*     Serum creatinine: 1.6 mg/dL (H) 01/06/19 0452  Estimated creatinine clearance: 45.3 mL/min (A)    Medication:amoxicillin-clavulanate dose: 500-125 mg  frequency:bid will be changed to medication:amoxicillin-clavulanate dose:500-125 mg  frequency:tid    Pharmacist's Name: Holly Liz  Pharmacist's Extension: [phone]

## 2019-01-06 NOTE — PLAN OF CARE
Problem: Adult Inpatient Plan of Care  Goal: Plan of Care Review  Outcome: Ongoing (interventions implemented as appropriate)  Pt remains free of falls and injury. Pt makes no statement of pain. Family member remained in room overnight. Pt used home CPAP at night.  Bed low and locked, Call light within reach.

## 2019-01-07 NOTE — ASSESSMENT & PLAN NOTE
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 Patient overall better with adequate diuresis with current regimen. Based on a low fractional excretion of Urea on admission would continue diuretics but would favor administrating albumin today as patient is likely intravascularly depleted.    Recommendations:  --Daily CMP, CBC, and INR  --Monitor UOP and Cr  --Administer albumin X 2  --Continue lasix and spironolactone but D/C metolazone

## 2019-01-07 NOTE — PROGRESS NOTES
[facility]  Hepatology  Progress Note    Patient Name: Hossein Sanchez Sr.  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 Patient was seen by Dr. Dumont yesterday in clinic. Patient reports worsening abdominal distention that started about three months ago. He denies any previous history of large volume paracentesis. He is on diuretics at home and is compliant. He otherwise feels okay except for abdominal distention. On routine labs checked he was noted to have an elevated Cr of 1.7 off his baseline of 0.9. He denies any alcohol use. On his most recent imaging on MR abdomen from 12/21 no residual tumor was seen.      Interval History:   No acute events overnight and no complaints this morning.  UOP 3.2L with a mild bump in Cr this morning.    Current Facility-Administered Medications   Medication    acetaminophen tablet 650 mg    albumin human 25% bottle 25 g    amoxicillin capsule 500 mg    apixaban tablet 5 mg    cyanocobalamin tablet 1,000 mcg    dextrose 50% injection 12.5 g    dextrose 50% injection 25 g    finasteride tablet 5 mg    furosemide (LASIX) 2 mg/mL in sodium chloride 0.9% 100 mL infusion (conc: 2 mg/mL)    glucagon (human recombinant) injection 1 mg    glucose chewable tablet 16 g    glucose chewable tablet 24 g    insulin aspart U-100 pen 0-5 Units    iron polysaccharides capsule 150 mg    metoprolol tartrate (LOPRESSOR) tablet 25 mg    ondansetron disintegrating tablet 8 mg    pravastatin tablet 10 mg    sodium chloride 0.9% flush 5 mL    spironolactone tablet 200 mg    tamsulosin 24 hr  capsule 0.4 mg    verapamil CR tablet 300 mg    vitamin D 1000 units tablet 2,000 Units       Objective:     Vital Signs (Most Recent):  Temp: 96.4 °F (35.8 °C) (01/07/19 1157)  Pulse: 63 (01/07/19 1157)  Resp: 16 (01/07/19 1157)  BP: (!) 120/57 (01/07/19 1157)  SpO2: (!) 92 % (01/07/19 1157) Vital Signs (24h Range):  Temp:  [96.4 °F (35.8 °C)-98.5 °F (36.9 °C)] 96.4 °F (35.8 °C)  Pulse:  [53-80] 63  Resp:  [16-18] 16  SpO2:  [92 %-98 %] 92 %  BP: (120-163)/(57-90) 120/57     Weight: 115.2 kg (253 lb 15.5 oz) (01/06/19 0400)  Body mass index is 43.59 kg/m².    Physical Exam   Constitutional: He is oriented to person, place, and time. No distress.   HENT:   Head: Normocephalic and atraumatic.   Eyes: No scleral icterus.   Cardiovascular: Normal rate and regular rhythm.   Pulmonary/Chest: Effort normal and breath sounds normal.   Abdominal: Soft. Bowel sounds are normal. He exhibits distension. He exhibits no mass. There is no tenderness. There is no rebound and no guarding. No hernia.   Musculoskeletal: He exhibits edema.   Neurological: He is alert and oriented to person, place, and time.   Skin: He is not diaphoretic.       MELD-Na score: 20 at 1/7/2019  4:31 AM  MELD score: 20 at 1/7/2019  4:31 AM  Calculated from:  Serum Creatinine: 1.7 mg/dL at 1/7/2019  4:31 AM  Serum Sodium: 139 mmol/L (Rounded to 137 mmol/L) at 1/7/2019  4:31 AM  Total Bilirubin: 1.9 mg/dL at 1/7/2019  4:31 AM  INR(ratio): 1.7 at 1/7/2019  4:31 AM  Age: 78 years    Significant Labs:  CBC:   Recent Labs   Lab 01/07/19  0431   WBC 8.56   RBC 4.02*   HGB 10.1*   HCT 32.5*   PLT 99*     CMP:   Recent Labs   Lab 01/07/19  0431   *   CALCIUM 8.9   ALBUMIN 3.1*   PROT 5.9*      K 4.1   CO2 35*   CL 91*   BUN 29*   CREATININE 1.7*   ALKPHOS 182*   ALT 25   AST 47*   BILITOT 1.9*     Coagulation:   Recent Labs   Lab 01/07/19  0431   INR 1.7*       Significant Imaging:  X-Ray: Reviewed  US: Reviewed    Assessment/Plan:     Liver  cirrhosis secondary to DRISCOLL    77 yo M with DRISCOLL cirrhosis c/b HCC (treated with Y-90), 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 Patient overall better with adequate diuresis with current regimen. Based on a low fractional excretion of Urea on admission would continue diuretics but would favor administrating albumin today as patient is likely intravascularly depleted.    Recommendations:  --Daily CMP, CBC, and INR  --Monitor UOP and Cr  --Administer albumin X 2  --Continue lasix and spironolactone but D/C metolazone              Thank you for your consult. I will follow-up with patient. Please contact us if you have any additional questions.    Kaylen Snyder M.D.  Gastroenterology Fellow, PGY-V  Pager: [phone]  [facility]

## 2019-01-07 NOTE — PLAN OF CARE
Problem: Fall Injury Risk  Goal: Absence of Fall and Fall-Related Injury  Outcome: Ongoing (interventions implemented as appropriate)  Meds given as ordered tolerated well. No complaints of pain. No signs or symptoms of distress/discomfort noted. Ambulated in room to restroom. Will continue to order.

## 2019-01-07 NOTE — SUBJECTIVE & OBJECTIVE
Interval History:   No acute events overnight and no complaints this morning.  UOP 3.2L with a mild bump in Cr this morning.    Current Facility-Administered Medications   Medication    acetaminophen tablet 650 mg    albumin human 25% bottle 25 g    amoxicillin capsule 500 mg    apixaban tablet 5 mg    cyanocobalamin tablet 1,000 mcg    dextrose 50% injection 12.5 g    dextrose 50% injection 25 g    finasteride tablet 5 mg    furosemide (LASIX) 2 mg/mL in sodium chloride 0.9% 100 mL infusion (conc: 2 mg/mL)    glucagon (human recombinant) injection 1 mg    glucose chewable tablet 16 g    glucose chewable tablet 24 g    insulin aspart U-100 pen 0-5 Units    iron polysaccharides capsule 150 mg    metoprolol tartrate (LOPRESSOR) tablet 25 mg    ondansetron disintegrating tablet 8 mg    pravastatin tablet 10 mg    sodium chloride 0.9% flush 5 mL    spironolactone tablet 200 mg    tamsulosin 24 hr capsule 0.4 mg    verapamil CR tablet 300 mg    vitamin D 1000 units tablet 2,000 Units       Objective:     Vital Signs (Most Recent):  Temp: 96.4 °F (35.8 °C) (01/07/19 1157)  Pulse: 63 (01/07/19 1157)  Resp: 16 (01/07/19 1157)  BP: (!) 120/57 (01/07/19 1157)  SpO2: (!) 92 % (01/07/19 1157) Vital Signs (24h Range):  Temp:  [96.4 °F (35.8 °C)-98.5 °F (36.9 °C)] 96.4 °F (35.8 °C)  Pulse:  [53-80] 63  Resp:  [16-18] 16  SpO2:  [92 %-98 %] 92 %  BP: (120-163)/(57-90) 120/57     Weight: 115.2 kg (253 lb 15.5 oz) (01/06/19 0400)  Body mass index is 43.59 kg/m².    Physical Exam   Constitutional: He is oriented to person, place, and time. No distress.   HENT:   Head: Normocephalic and atraumatic.   Eyes: No scleral icterus.   Cardiovascular: Normal rate and regular rhythm.   Pulmonary/Chest: Effort normal and breath sounds normal.   Abdominal: Soft. Bowel sounds are normal. He exhibits distension. He exhibits no mass. There is no tenderness. There is no rebound and no guarding. No hernia.   Musculoskeletal: He  exhibits edema.   Neurological: He is alert and oriented to person, place, and time.   Skin: He is not diaphoretic.       MELD-Na score: 20 at 1/7/2019  4:31 AM  MELD score: 20 at 1/7/2019  4:31 AM  Calculated from:  Serum Creatinine: 1.7 mg/dL at 1/7/2019  4:31 AM  Serum Sodium: 139 mmol/L (Rounded to 137 mmol/L) at 1/7/2019  4:31 AM  Total Bilirubin: 1.9 mg/dL at 1/7/2019  4:31 AM  INR(ratio): 1.7 at 1/7/2019  4:31 AM  Age: 78 years    Significant Labs:  CBC:   Recent Labs   Lab 01/07/19 0431   WBC 8.56   RBC 4.02*   HGB 10.1*   HCT 32.5*   PLT 99*     CMP:   Recent Labs   Lab 01/07/19 0431   *   CALCIUM 8.9   ALBUMIN 3.1*   PROT 5.9*      K 4.1   CO2 35*   CL 91*   BUN 29*   CREATININE 1.7*   ALKPHOS 182*   ALT 25   AST 47*   BILITOT 1.9*     Coagulation:   Recent Labs   Lab 01/07/19 0431   INR 1.7*       Significant Imaging:  X-Ray: Reviewed  US: Reviewed

## 2019-01-08 NOTE — PROGRESS NOTES
Progress Note   Hospital Medicine         Patient Name: Hossein Sanchez Sr.  MRN:  [id number]  Hospital Medicine Team: Networked reference to record PCT  Luis Fernando Navarro MD  Date of Admission:  1/2/2019     Length of Stay:  LOS: 6 days   Expected Discharge Date: 1/10/2019  Principal Problem:  Anasarca       Subjective:     Interval History/Overnight Events:  Patient put out 1.4L, Cr clarisa to 2; holding lasix drip and monitoring today; plan on starting oral diuretics tomorrow; patient has lost about 40 pounds while admitted;     Review of Systems   Constitutional: Negative for chills, fatigue, fever.   HENT: Negative for sore throat, trouble swallowing.    Eyes: Negative for photophobia, visual disturbance.   Respiratory: Negative for cough, shortness of breath.    Cardiovascular: Negative for chest pain, palpitations, leg swelling.   Gastrointestinal: Negative for abdominal pain, constipation, diarrhea, nausea, vomiting.   Endocrine: Negative for cold intolerance, heat intolerance.   Genitourinary: Negative for dysuria, frequency.   Musculoskeletal: Negative for arthralgias, myalgias.   Skin: Negative for rash, wound, erythema   Neurological: Negative for dizziness, syncope, weakness, light-headedness.   Psychiatric/Behavioral: Negative for confusion, hallucinations, anxiety  All other systems reviewed and are negative.    Objective:     Temp:  [96.4 °F (35.8 °C)-97.7 °F (36.5 °C)]   Pulse:  [47-74]   Resp:  [16-18]   BP: (125-141)/(60-69)   SpO2:  [90 %-98 %]       Physical Exam:  Constitutional: appears weak and ill  Head: Normocephalic and atraumatic.   Mouth/Throat: Oropharynx is clear and moist.   Eyes: EOM are normal. Pupils are equal, round, and reactive to light. No scleral icterus.   Neck: Normal range of motion. Neck supple.   Cardiovascular: Normal rate and regular rhythm.  No murmur heard.  Pulmonary/Chest: Effort normal and breath sounds normal. No respiratory distress. No wheezes, rales, or  rhonchi  Abdominal: Soft. Bowel sounds are normal.  Distended, no TTP  Musculoskeletal: Normal range of motion. Plus 3 pitting edema.   Neurological: Alert and oriented to person, place, and time.   Skin: Skin is warm and dry.   Psychiatric: Normal mood and affect. Behavior is normal.     Recent Labs   Lab 01/03/19  0515 01/04/19  0359 01/05/19  0329 01/06/19  0452 01/07/19  0431 01/08/19  0614   WBC 5.51 5.39 6.94 8.42 8.56 7.23   HGB 9.2* 9.5* 10.1* 10.1* 10.1* 9.4*   HCT 30.1* 29.9* 32.4* 32.5* 32.5* 29.7*   * 86* 97* 105* 99* 91*     Recent Labs   Lab 01/06/19  0452 01/07/19  0431 01/08/19  0614    139 139   K 3.1* 4.1 3.5   CL 95 91* 88*   CO2 35* 35* 38*   BUN 26* 29* 35*   CREATININE 1.6* 1.7* 2.0*   * 136* 130*   CALCIUM 8.4* 8.9 9.6   MG 1.4* 1.5* 1.9   PHOS 2.9 2.7 3.7     Recent Labs   Lab 01/06/19  0452 01/07/19  0431 01/08/19  0614   ALKPHOS 172* 182* 167*   ALT 23 25 23   AST 46* 47* 45*   ALBUMIN 3.1* 3.1* 3.7   PROT 5.8* 5.9* 6.3   BILITOT 1.7* 1.9* 2.1*   INR 1.6* 1.7* 1.7*     Recent Labs   Lab 01/06/19  2203 01/07/19  0938 01/07/19  1219 01/07/19  1732 01/08/19  0832 01/08/19  1216   POCTGLUCOSE 138* 111* 175* 111* 133* 163*        amoxicillin  500 mg Oral Q12H    apixaban  5 mg Oral BID    cyanocobalamin  1,000 mcg Oral Daily    finasteride  5 mg Oral Daily    iron polysaccharides  150 mg Oral BID    metoprolol tartrate  25 mg Oral BID    pravastatin  10 mg Oral Daily    spironolactone  200 mg Oral Daily    tamsulosin  0.4 mg Oral Daily    verapamil  300 mg Oral QHS    vitamin D  2,000 Units Oral Daily       Assessment and Plan     Mr. Hossein Sanchez Sr. is a 78 y.o. male who presented to [facility] on 1/2/2019 with     Hospital Course:    Mr. Hossein Sanchez Sr. was admitted to Hospital Medicine for management of     Active Hospital Problems    Diagnosis  POA    *Anasarca [R60.1]  Yes    Acute cystitis without hematuria [N30.00]  Yes    Enterococcus UTI [N39.0, B95.2]   Yes    Acute diastolic heart failure [I50.31]  Yes    Decompensated hepatic cirrhosis [K72.90]  Yes    Morbid obesity [E66.01]  Yes    Anemia of chronic disease [D63.8]  Yes    Thrombocytopenia [D69.6]  Yes    Acute kidney injury [N17.9]  Yes    Vitamin D deficiency [E55.9]  Yes    Iron deficiency anemia [D50.9]  Yes    Benign prostatic hyperplasia with urinary hesitancy [N40.1, R39.11]  Yes    Type 2 diabetes mellitus without complication, without long-term current use of insulin [E11.9]  Yes    Chronic a-fib [I48.2]  Yes    HCC (hepatocellular carcinoma) [C22.0]  Yes    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       # YEMI  - Cr 1.7 on admit with normal baseline; likely due to fluid overload; 2.0 today  - renal U/S and urine lytes reviewed   - stopping lasix drip     # Acute diastolic heart failure  # Anasarca  - TTE shows diastolic heart failure  -  put out 1.4L,  Holding lasix drip today    # Decompensated DRISCOLL Cirrhosis with ascites  # HCC  MELD-Na score: 22 at 1/8/2019  6:14 AM  MELD score: 22 at 1/8/2019  6:14 AM  Calculated from:  Serum Creatinine: 2 mg/dL at 1/8/2019  6:14 AM  Serum Sodium: 139 mmol/L (Rounded to 137 mmol/L) at 1/8/2019  6:14 AM  Total Bilirubin: 2.1 mg/dL at 1/8/2019  6:14 AM  INR(ratio): 1.7 at 1/8/2019  6:14 AM  Age: 78 years  - HCC has been treated;  - went for IR paracentesis with 4.9L removed  - hepatology consulted  - Liver U/S shows multiple masses; had recent MRI abdomen    # Essential HTN  - cont home BP meds    #  Atrial fibrillation with RVR  - lopressor to 25 mg PO BID and cont eliquis; RVR resolved    # DM2 without complication   - cont insulin  - Hba1c 5.3    # Acute cystitis without hematuria due to enterococcus   - patient on augmentin for a total of ten days    # Anemia of chronic disease  # Thrombocytopenia  # Coagulopathy  -DIC labs  -    # Hypomagnesemia/Hypokalemia  -  replace    # Morbid Obesity  Body mass index is 40.85 kg/m².  dietary          Diet:  Low sodium  GI PPx:    DVT PPx:    Goals of Care:  full      Disposition:  Thursday     Luis Fernando Navarro MD  Medical Director [facility]  Spectra:  [phone]  Pager: [phone]

## 2019-01-08 NOTE — PLAN OF CARE
01/08/19 0849   Discharge Reassessment   Assessment Type Discharge Planning Reassessment   Provided patient/caregiver education on the expected discharge date and the discharge plan Yes   Do you have any problems affording any of your prescribed medications? TBD   Discharge Plan A Home with family   Discharge Plan B Home with family;Home Health   Anticipated Discharge Disposition Home   Can the patient answer the patient profile reliably? Yes, cognitively intact   How does the patient rate their overall health at the present time? Fair   Describe the patient's ability to walk at the present time. Walks with the help of equipment   How often would a person be available to care for the patient? Whenever needed   During the past month, has the patient often been bothered by feeling down, depressed or hopeless? No   During the past month, has the patient often been bothered by little interest or pleasure in doing things? No

## 2019-01-08 NOTE — SUBJECTIVE & OBJECTIVE
Interval History:   Patient with no complaints.  Good UOP however rising Cr this morning.     Current Facility-Administered Medications   Medication    acetaminophen tablet 650 mg    amoxicillin capsule 500 mg    apixaban tablet 5 mg    cyanocobalamin tablet 1,000 mcg    dextrose 50% injection 12.5 g    dextrose 50% injection 25 g    finasteride tablet 5 mg    glucagon (human recombinant) injection 1 mg    glucose chewable tablet 16 g    glucose chewable tablet 24 g    insulin aspart U-100 pen 0-5 Units    iron polysaccharides capsule 150 mg    metoprolol tartrate (LOPRESSOR) tablet 25 mg    ondansetron disintegrating tablet 8 mg    pravastatin tablet 10 mg    sodium chloride 0.9% flush 5 mL    spironolactone tablet 200 mg    tamsulosin 24 hr capsule 0.4 mg    verapamil CR tablet 300 mg    vitamin D 1000 units tablet 2,000 Units       Objective:     Vital Signs (Most Recent):  Temp: 97 °F (36.1 °C) (01/08/19 1149)  Pulse: (!) 58 (01/08/19 1149)  Resp: 18 (01/08/19 1149)  BP: 125/65 (01/08/19 1149)  SpO2: 96 % (01/08/19 1149) Vital Signs (24h Range):  Temp:  [96.4 °F (35.8 °C)-97.7 °F (36.5 °C)] 97 °F (36.1 °C)  Pulse:  [47-74] 58  Resp:  [16-18] 18  SpO2:  [90 %-98 %] 96 %  BP: (125-141)/(60-69) 125/65     Weight: 108 kg (238 lb) (01/08/19 0535)  Body mass index is 40.85 kg/m².    Physical Exam   Constitutional: He is oriented to person, place, and time. No distress.   HENT:   Head: Normocephalic and atraumatic.   Eyes: No scleral icterus.   Cardiovascular: Normal rate and regular rhythm.   Pulmonary/Chest: Effort normal and breath sounds normal.   Abdominal: Soft. Bowel sounds are normal. He exhibits distension. He exhibits no mass. There is no tenderness. There is no rebound and no guarding. No hernia.   Musculoskeletal: He exhibits edema.   Neurological: He is alert and oriented to person, place, and time.   Skin: He is not diaphoretic.       MELD-Na score: 22 at 1/8/2019  6:14 AM  MELD score:  22 at 1/8/2019  6:14 AM  Calculated from:  Serum Creatinine: 2 mg/dL at 1/8/2019  6:14 AM  Serum Sodium: 139 mmol/L (Rounded to 137 mmol/L) at 1/8/2019  6:14 AM  Total Bilirubin: 2.1 mg/dL at 1/8/2019  6:14 AM  INR(ratio): 1.7 at 1/8/2019  6:14 AM  Age: 78 years    Significant Labs:  CBC:   Recent Labs   Lab 01/08/19 0614   WBC 7.23   RBC 3.82*   HGB 9.4*   HCT 29.7*   PLT 91*     CMP:   Recent Labs   Lab 01/08/19 0614   *   CALCIUM 9.6   ALBUMIN 3.7   PROT 6.3      K 3.5   CO2 38*   CL 88*   BUN 35*   CREATININE 2.0*   ALKPHOS 167*   ALT 23   AST 45*   BILITOT 2.1*     Coagulation:   Recent Labs   Lab 01/08/19 0614   INR 1.7*       Significant Imaging:  US: Reviewed

## 2019-01-08 NOTE — PROGRESS NOTES
[facility]  Hepatology  Progress Note    Patient Name: Hossein Sanchez Sr.  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 Patient was seen by Dr. Dumont yesterday in clinic. Patient reports worsening abdominal distention that started about three months ago. He denies any previous history of large volume paracentesis. He is on diuretics at home and is compliant. He otherwise feels okay except for abdominal distention. On routine labs checked he was noted to have an elevated Cr of 1.7 off his baseline of 0.9. He denies any alcohol use. On his most recent imaging on MR abdomen from 12/21 no residual tumor was seen.      Interval History:   Patient with no complaints.  Good UOP however rising Cr this morning.     Current Facility-Administered Medications   Medication    acetaminophen tablet 650 mg    amoxicillin capsule 500 mg    apixaban tablet 5 mg    cyanocobalamin tablet 1,000 mcg    dextrose 50% injection 12.5 g    dextrose 50% injection 25 g    finasteride tablet 5 mg    glucagon (human recombinant) injection 1 mg    glucose chewable tablet 16 g    glucose chewable tablet 24 g    insulin aspart U-100 pen 0-5 Units    iron polysaccharides capsule 150 mg    metoprolol tartrate (LOPRESSOR) tablet 25 mg    ondansetron disintegrating tablet 8 mg    pravastatin tablet 10 mg    sodium chloride 0.9% flush 5 mL    spironolactone tablet 200 mg    tamsulosin 24 hr capsule 0.4 mg    verapamil CR tablet 300 mg    vitamin D 1000 units tablet 2,000 Units       Objective:     Vital Signs (Most Recent):  Temp: 97 °F (36.1  °C) (01/08/19 1149)  Pulse: (!) 58 (01/08/19 1149)  Resp: 18 (01/08/19 1149)  BP: 125/65 (01/08/19 1149)  SpO2: 96 % (01/08/19 1149) Vital Signs (24h Range):  Temp:  [96.4 °F (35.8 °C)-97.7 °F (36.5 °C)] 97 °F (36.1 °C)  Pulse:  [47-74] 58  Resp:  [16-18] 18  SpO2:  [90 %-98 %] 96 %  BP: (125-141)/(60-69) 125/65     Weight: 108 kg (238 lb) (01/08/19 0535)  Body mass index is 40.85 kg/m².    Physical Exam   Constitutional: He is oriented to person, place, and time. No distress.   HENT:   Head: Normocephalic and atraumatic.   Eyes: No scleral icterus.   Cardiovascular: Normal rate and regular rhythm.   Pulmonary/Chest: Effort normal and breath sounds normal.   Abdominal: Soft. Bowel sounds are normal. He exhibits distension. He exhibits no mass. There is no tenderness. There is no rebound and no guarding. No hernia.   Musculoskeletal: He exhibits edema.   Neurological: He is alert and oriented to person, place, and time.   Skin: He is not diaphoretic.       MELD-Na score: 22 at 1/8/2019  6:14 AM  MELD score: 22 at 1/8/2019  6:14 AM  Calculated from:  Serum Creatinine: 2 mg/dL at 1/8/2019  6:14 AM  Serum Sodium: 139 mmol/L (Rounded to 137 mmol/L) at 1/8/2019  6:14 AM  Total Bilirubin: 2.1 mg/dL at 1/8/2019  6:14 AM  INR(ratio): 1.7 at 1/8/2019  6:14 AM  Age: 78 years    Significant Labs:  CBC:   Recent Labs   Lab 01/08/19 0614   WBC 7.23   RBC 3.82*   HGB 9.4*   HCT 29.7*   PLT 91*     CMP:   Recent Labs   Lab 01/08/19 0614   *   CALCIUM 9.6   ALBUMIN 3.7   PROT 6.3      K 3.5   CO2 38*   CL 88*   BUN 35*   CREATININE 2.0*   ALKPHOS 167*   ALT 23   AST 45*   BILITOT 2.1*     Coagulation:   Recent Labs   Lab 01/08/19  0614   INR 1.7*       Significant Imaging:  US: Reviewed    Assessment/Plan:     Liver 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 Patient overall better with adequate diuresis however rising Cr this morning. At this point favor holding diuretics today and observing UOP/Cr over the next 24 hours.    Recommendations:  --Daily CMP, CBC, and INR  --Monitor UOP and Cr  --Hold diuretics              Thank you for your consult. I will follow-up with patient. Please contact us if you have any additional questions.    Kaylen Snyder M.D.  Gastroenterology Fellow, PGY-V  Pager: [phone]  [facility]

## 2019-01-08 NOTE — ASSESSMENT & PLAN NOTE
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 Patient overall better with adequate diuresis however rising Cr this morning. At this point favor holding diuretics today and observing UOP/Cr over the next 24 hours.    Recommendations:  --Daily CMP, CBC, and INR  --Monitor UOP and Cr  --Hold diuretics

## 2019-01-08 NOTE — PROGRESS NOTES
Progress Note   Hospital Medicine         Patient Name: Hossein Sanchez Sr.  MRN:  [id number]  Hospital Medicine Team: Networked reference to record PCT  Luis Fernando Navarro MD  Date of Admission:  1/2/2019     Length of Stay:  LOS: 5 days   Expected Discharge Date: 1/9/2019  Principal Problem:  Anasarca       Subjective:     Interval History/Overnight Events:  Patient put out 3.7L, cont lasix drip with albumin today and plan to transition to PO lasix tomorrow    Review of Systems   Constitutional: Negative for chills, fatigue, fever.   HENT: Negative for sore throat, trouble swallowing.    Eyes: Negative for photophobia, visual disturbance.   Respiratory: Negative for cough, shortness of breath.    Cardiovascular: Negative for chest pain, palpitations, leg swelling.   Gastrointestinal: Negative for abdominal pain, constipation, diarrhea, nausea, vomiting.   Endocrine: Negative for cold intolerance, heat intolerance.   Genitourinary: Negative for dysuria, frequency.   Musculoskeletal: Negative for arthralgias, myalgias.   Skin: Negative for rash, wound, erythema   Neurological: Negative for dizziness, syncope, weakness, light-headedness.   Psychiatric/Behavioral: Negative for confusion, hallucinations, anxiety  All other systems reviewed and are negative.    Objective:     Temp:  [96.4 °F (35.8 °C)-98.5 °F (36.9 °C)]   Pulse:  [53-80]   Resp:  [16-18]   BP: (120-144)/(57-77)   SpO2:  [92 %-98 %]       Physical Exam:  Constitutional: appears weak and ill  Head: Normocephalic and atraumatic.   Mouth/Throat: Oropharynx is clear and moist.   Eyes: EOM are normal. Pupils are equal, round, and reactive to light. No scleral icterus.   Neck: Normal range of motion. Neck supple.   Cardiovascular: Normal rate and regular rhythm.  No murmur heard.  Pulmonary/Chest: Effort normal and breath sounds normal. No respiratory distress. No wheezes, rales, or rhonchi  Abdominal: Soft. Bowel sounds are normal.  Distended, no  TTP  Musculoskeletal: Normal range of motion. Plus 3 pitting edema.   Neurological: Alert and oriented to person, place, and time.   Skin: Skin is warm and dry.   Psychiatric: Normal mood and affect. Behavior is normal.     Recent Labs   Lab 01/02/19  1200 01/03/19  0515 01/04/19  0359 01/05/19  0329 01/06/19  0452 01/07/19  0431   WBC 9.38 5.51 5.39 6.94 8.42 8.56   HGB 11.4* 9.2* 9.5* 10.1* 10.1* 10.1*   HCT 37.2* 30.1* 29.9* 32.4* 32.5* 32.5*   * 102* 86* 97* 105* 99*     Recent Labs   Lab 01/05/19  0329 01/06/19  0452 01/07/19  0431    141 139   K 3.0* 3.1* 4.1   CL 98 95 91*   CO2 33* 35* 35*   BUN 25* 26* 29*   CREATININE 1.6* 1.6* 1.7*   * 137* 136*   CALCIUM 8.4* 8.4* 8.9   MG 1.1* 1.4* 1.5*   PHOS 2.8 2.9 2.7     Recent Labs   Lab 01/05/19  0329 01/06/19  0452 01/07/19  0431   ALKPHOS 159* 172* 182*   ALT 19 23 25   AST 40 46* 47*   ALBUMIN 3.1* 3.1* 3.1*   PROT 5.7* 5.8* 5.9*   BILITOT 1.3* 1.7* 1.9*   INR 1.7* 1.6* 1.7*     Recent Labs   Lab 01/06/19  1314 01/06/19  1810 01/06/19  2203 01/07/19  0938 01/07/19  1219 01/07/19  1732   POCTGLUCOSE 165* 136* 138* 111* 175* 111*        albumin human 25%  25 g Intravenous BID    amoxicillin  500 mg Oral Q12H    apixaban  5 mg Oral BID    cyanocobalamin  1,000 mcg Oral Daily    finasteride  5 mg Oral Daily    iron polysaccharides  150 mg Oral BID    metoprolol tartrate  25 mg Oral TID    pravastatin  10 mg Oral Daily    spironolactone  200 mg Oral Daily    tamsulosin  0.4 mg Oral Daily    verapamil  300 mg Oral QHS    vitamin D  2,000 Units Oral Daily       Assessment and Plan     Mr. Hossein Sanchez Sr. is a 78 y.o. male who presented to [facility] on 1/2/2019 with     Hospital Course:    Mr. Hossein Sanchez Sr. was admitted to Hospital Medicine for management of     Active Hospital Problems    Diagnosis  POA    *Anasarca [R60.1]  Yes    Acute cystitis without hematuria [N30.00]  Yes    Enterococcus UTI [N39.0, B95.2]  Yes    Acute  diastolic heart failure [I50.31]  Yes    Decompensated hepatic cirrhosis [K72.90]  Yes    Morbid obesity [E66.01]  Yes    Anemia of chronic disease [D63.8]  Yes    Thrombocytopenia [D69.6]  Yes    Acute kidney injury [N17.9]  Yes    Vitamin D deficiency [E55.9]  Yes    Iron deficiency anemia [D50.9]  Yes    Benign prostatic hyperplasia with urinary hesitancy [N40.1, R39.11]  Yes    Type 2 diabetes mellitus without complication, without long-term current use of insulin [E11.9]  Yes    Chronic a-fib [I48.2]  Yes    HCC (hepatocellular carcinoma) [C22.0]  Yes    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       # YEMI  - Cr 1.7 on admit with normal baseline; likely due to fluid overload; 1.7 today  - renal U/S and urine lytes reviewed   - will give 2 doses of albumin     # Acute diastolic heart failure  # Anasarca  - TTE shows diastolic heart failure  -  put out 3.7L,  lasix drip 15 mg/hr and two doses of albumin    # Decompensated DRISCOLL Cirrhosis with ascites  # HCC  MELD-Na score: 20 at 1/7/2019  4:31 AM  MELD score: 20 at 1/7/2019  4:31 AM  Calculated from:  Serum Creatinine: 1.7 mg/dL at 1/7/2019  4:31 AM  Serum Sodium: 139 mmol/L (Rounded to 137 mmol/L) at 1/7/2019  4:31 AM  Total Bilirubin: 1.9 mg/dL at 1/7/2019  4:31 AM  INR(ratio): 1.7 at 1/7/2019  4:31 AM  Age: 78 years  - HCC has been treated;  - went for IR paracentesis with 4.9L removed  - hepatology consulted  - Liver U/S shows multiple masses; had recent MRI abdomen    # Essential HTN  - cont home BP meds    #  Atrial fibrillation with RVR  - lopressor to 25 mg PO TID and cont eliquis; RVR resolved    # DM2 without complication   - cont insulin  - Hba1c 5.3    # Acute cystitis without hematuria due to enterococcus   - patient on augmentin for a total of ten days    # Anemia of chronic disease  # Thrombocytopenia  # Coagulopathy  -DIC labs  -    #  Hypomagnesemia/Hypokalemia  - replace    # Morbid Obesity  Body mass index is 43.59 kg/m².  dietary          Diet:  Low sodium  GI PPx:    DVT PPx:    Goals of Care:  full      Disposition:  Wednesday     Luis Fernando Navarro MD  Medical Director [facility] Medicine  Spectra:  [phone]  Pager: [phone]

## 2019-01-08 NOTE — PLAN OF CARE
Problem: Fall Injury Risk  Goal: Absence of Fall and Fall-Related Injury  Outcome: Ongoing (interventions implemented as appropriate)  Meds given as ordered tolerated well. No signs or symptoms of distress/discomfort noted. Ambulated to restrooms. Lasix drip dc'd. Will continue to monitor.

## 2019-01-09 NOTE — ASSESSMENT & PLAN NOTE
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 Patient overall better as it seems that his weight is back at baseline. Diuretics were stopped yesterday and there was a mild improvement in Cr today. We still favor holding off on diuretics today and seeing if there is additional improvement before deciding on a home regimen.    Recommendations:  --Daily CMP, CBC, and INR  --Monitor UOP and Cr  --Hold diuretics

## 2019-01-09 NOTE — PROGRESS NOTES
Progress Note  Hospital Medicine  [facility]       Patient Name: Hossein Sanchez Sr.  MRN:  [id number]  Hospital Medicine Team: [facility] Indigo Rice MD  Date of Admission:  1/2/2019     Length of Stay:  LOS: 7 days   Expected Discharge Date: 1/11/2019  Principal Problem:  Anasarca     Subjective:     Interval History/Overnight Events:    Doing well today with no acute issues. Net -3L in the last 24 hrs and net -19 L since admit. S/p IV lasix ggt diuresis. Weight is 238 lbs from prior of 240+lbs  in 11/2018.  Cr increased to 2.0 and now down trending to 1.9 with diuretic cessation.   VSS, BP well controlled. HR of 50s and has been on beta blockers for Afib with RVR. Adjusting meds accordingly      amoxicillin  500 mg Oral Q12H    apixaban  5 mg Oral BID    cyanocobalamin  1,000 mcg Oral Daily    finasteride  5 mg Oral Daily    iron polysaccharides  150 mg Oral BID    [START ON 1/10/2019] metoprolol succinate  25 mg Oral Daily    pravastatin  10 mg Oral Daily    tamsulosin  0.4 mg Oral Daily    verapamil  180 mg Oral QHS    vitamin D  2,000 Units Oral Daily           acetaminophen, dextrose 50%, dextrose 50%, glucagon (human recombinant), glucose, glucose, insulin aspart U-100, ondansetron, sodium chloride 0.9%    Review of Systems   Constitutional: Negative for chills, fatigue, fever.   HENT: Negative for sore throat, trouble swallowing.    Eyes: Negative for photophobia, visual disturbance.   Respiratory: Negative for cough, shortness of breath.    Cardiovascular: Negative for chest pain, palpitations, leg swelling.   Gastrointestinal: Negative for abdominal pain, constipation, diarrhea, nausea, vomiting.   Endocrine: Negative for cold intolerance, heat intolerance.   Genitourinary: Negative for dysuria, frequency.   Musculoskeletal: Negative for arthralgias, myalgias.   Skin: Negative for rash, wound, erythema   Neurological: Negative for dizziness, syncope,  weakness, light-headedness.   Psychiatric/Behavioral: Negative for confusion, hallucinations, anxiety  All other systems reviewed and are negative.    Objective:     Temp:  [96.7 °F (35.9 °C)-97.7 °F (36.5 °C)]   Pulse:  [52-70]   Resp:  [17-18]   BP: (127-137)/(61-92)   SpO2:  [92 %-96 %]       Physical Exam:  Constitutional: appears chronically ill, non-distressed, not diaphoretic.   HENT: NC/AT, external ears normal, oropharynx clear, MMM w/o exudates.   Eyes: PERRL, EOMI, conjunctiva normal, no discharge b/l, no scleral icterus   Neck: normal ROM, supple   CV: RRR, no m/r/g, no carotid bruits, +2 peripheral pulses.  Pulmonary/Chest wall: Breathing comfortably w/o distress, CTAB, no w/r/r, no crackles.  GI: Soft, non-tender, mildly distended, (+) BS, (+) BM   Musculoskeletal: Normal ROM, 2+ pitting edema on mid calf- pt's baseline ,  no tenderness throughout   Neurological: AAO x 4, CN II-XI in tact,  Skin: warm, dry, (-) erythema, (-) rash, (+)pallor  Psych: normal mood and affect, normal behavior, thought content and judgement.    Labs:    Chemistries:   Recent Labs   Lab 01/07/19 0431 01/08/19  0614 01/09/19  0536    139 136   K 4.1 3.5 3.2*   CL 91* 88* 88*   CO2 35* 38* 39*   BUN 29* 35* 35*   CREATININE 1.7* 2.0* 1.9*   CALCIUM 8.9 9.6 9.4   PROT 5.9* 6.3 6.1   BILITOT 1.9* 2.1* 2.0*   ALKPHOS 182* 167* 171*   ALT 25 23 25   AST 47* 45* 46*   MG 1.5* 1.9 1.6   PHOS 2.7 3.7 2.9        WBC:   Recent Labs   Lab 01/05/19  0329 01/06/19  0452 01/07/19  0431 01/08/19  0614 01/09/19  0536   WBC 6.94 8.42 8.56 7.23 6.58     Bands:     CBC/Anemia Labs: Coags:    Recent Labs   Lab 01/07/19  0431 01/08/19  0614 01/09/19  0536   WBC 8.56 7.23 6.58   HGB 10.1* 9.4* 9.8*   HCT 32.5* 29.7* 30.7*   PLT 99* 91* 90*   MCV 81* 78* 80*   RDW 23.3* 23.4* 23.1*    Recent Labs   Lab 01/07/19  0431 01/08/19  0614 01/09/19  0536   INR 1.7* 1.7* 1.7*          Assessment and Plan     Hospital Course:    Mr. Hossein Sanchez Sr.  78 y.o. gentleman with HCC (s/p radioembolization), DRISCOLL cirrhosis (MELd of 18, not liver tx candidate), Lung CA (adenoCA in situ s/p SBRT), persistent Atrial Fibrillation on Xarelto, NIDDM-2 and iron deficiency anemia who presented on 1/2 to [facility] from Hepatology clinic for YEMI, with Cr increase to 1.7 from 0.9 prior. Also found to have volume overload and anasarca and was started on aggressive diuresis with IV lasix ggt, with subsequent increase in Cr to 2.0. S/p paracentesis on 1/3 with almost 5 L drained    Active Hospital Problems    Diagnosis  POA    *Anasarca [R60.1]  Yes    Acute cystitis without hematuria [N30.00]  Yes    Enterococcus UTI [N39.0, B95.2]  Yes    Acute diastolic heart failure [I50.31]  Yes    Decompensated hepatic cirrhosis [K72.90]  Yes    Morbid obesity [E66.01]  Yes    Anemia of chronic disease [D63.8]  Yes    Thrombocytopenia [D69.6]  Yes    Acute kidney injury [N17.9]  Yes    Vitamin D deficiency [E55.9]  Yes    Iron deficiency anemia [D50.9]  Yes    Benign prostatic hyperplasia with urinary hesitancy [N40.1, R39.11]  Yes    Type 2 diabetes mellitus without complication, without long-term current use of insulin [E11.9]  Yes    Chronic a-fib [I48.2]  Yes    HCC (hepatocellular carcinoma) [C22.0]  Yes    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       # YEMI  - Cr 1.7 on admit with normal baseline; likely due to fluid overload; 2.0  -1.9 now  - renal U/S and urine lytes reviewed   -  stopped lasix drip  and spironolactone  - monitor UOP and Cr  - may consider trial of PO diuretics tomorrow     # Acute diastolic heart failure  # Anasarca  - TTE shows diastolic heart failure  -  Holding diuretics at this time , as above      # Decompensated DRISCOLL Cirrhosis with ascites  # HCC  MELD-Na score: 22 at 1/9/2019  5:36 AM  MELD score: 21 at 1/9/2019  5:36 AM  Calculated from:  Serum Creatinine: 1.9 mg/dL at  1/9/2019  5:36 AM  Serum Sodium: 136 mmol/L at 1/9/2019  5:36 AM  Total Bilirubin: 2 mg/dL at 1/9/2019  5:36 AM  INR(ratio): 1.7 at 1/9/2019  5:36 AM  Age: 78 years  - HCC has been treated;Liver U/S shows multiple masses; had recent MRI abdomen  - hepatology following   - diuretics held. Monitoring renal function      # Essential HTN  - adjust verapamil to 180mg from 300 given aashish      #  Atrial fibrillation with RVR  - metoprolol succ 25 mg PO  daily ( adjusted based on HR/ aashish); RVR now resolved  - and cont eliquis  - adjust verapamil to 180mg from 300 given aashish      # DM2 without complication   - cont insulin  - Hba1c 5.3     # Acute cystitis without hematuria due to enterococcus   - patient on augmentin for a total of 10 days     # Anemia of chronic disease  # Thrombocytopenia  # Coagulopathy  - hbg of 10 which is baseline, plts of 90s which is baseline, INR of 1.7 in setting of cirrhosis  - cont apixaban and monitor labs. Monitor for bleeding     # Hypomagnesemia/Hypokalemia  - replaced     # Morbid Obesity  Body mass index is 40.85 kg/m².    Diet:  cardiac  GI PPx:  -  DVT PPx:  On apixaban   Goals of Care:  full    High Risk Conditions:  Anasarca     Disposition:    Likely d/c home on 1/11, no needs. Hepatology f/u    Signing Physician:     Indigo Rice MD  Department of Hospital Medicine   [facility]  Pager [phone] Ycsvxrg [phone]  1/9/2019

## 2019-01-09 NOTE — SUBJECTIVE & OBJECTIVE
Interval History:   UOP of 3L with mild improvement in Cr; otherwise no acute events.    Current Facility-Administered Medications   Medication    acetaminophen tablet 650 mg    amoxicillin capsule 500 mg    apixaban tablet 5 mg    cyanocobalamin tablet 1,000 mcg    dextrose 50% injection 12.5 g    dextrose 50% injection 25 g    finasteride tablet 5 mg    glucagon (human recombinant) injection 1 mg    glucose chewable tablet 16 g    glucose chewable tablet 24 g    insulin aspart U-100 pen 0-5 Units    iron polysaccharides capsule 150 mg    [START ON 1/10/2019] metoprolol succinate (TOPROL-XL) 24 hr tablet 25 mg    ondansetron disintegrating tablet 8 mg    potassium chloride SA CR tablet 40 mEq    pravastatin tablet 10 mg    sodium chloride 0.9% flush 5 mL    tamsulosin 24 hr capsule 0.4 mg    verapamil CR tablet 180 mg    vitamin D 1000 units tablet 2,000 Units       Objective:     Vital Signs (Most Recent):  Temp: 96.7 °F (35.9 °C) (01/09/19 1152)  Pulse: 60 (01/09/19 1152)  Resp: 17 (01/09/19 1152)  BP: 127/61 (01/09/19 1152)  SpO2: 95 % (01/09/19 1152) Vital Signs (24h Range):  Temp:  [96.7 °F (35.9 °C)-98.3 °F (36.8 °C)] 96.7 °F (35.9 °C)  Pulse:  [52-70] 60  Resp:  [17-18] 17  SpO2:  [92 %-96 %] 95 %  BP: (127-137)/(60-92) 127/61     Weight: 108 kg (238 lb) (01/08/19 0535)  Body mass index is 40.85 kg/m².    Physical Exam   Constitutional: He is oriented to person, place, and time. No distress.   HENT:   Head: Normocephalic and atraumatic.   Eyes: No scleral icterus.   Cardiovascular: Normal rate and regular rhythm.   Pulmonary/Chest: Effort normal and breath sounds normal.   Abdominal: Soft. Bowel sounds are normal. He exhibits distension. He exhibits no mass. There is no tenderness. There is no rebound and no guarding. No hernia.   Musculoskeletal: He exhibits edema.   Neurological: He is alert and oriented to person, place, and time.   Skin: He is not diaphoretic.       MELD-Na score: 22  at 1/9/2019  5:36 AM  MELD score: 21 at 1/9/2019  5:36 AM  Calculated from:  Serum Creatinine: 1.9 mg/dL at 1/9/2019  5:36 AM  Serum Sodium: 136 mmol/L at 1/9/2019  5:36 AM  Total Bilirubin: 2 mg/dL at 1/9/2019  5:36 AM  INR(ratio): 1.7 at 1/9/2019  5:36 AM  Age: 78 years    Significant Labs:  CBC:   Recent Labs   Lab 01/09/19  0536   WBC 6.58   RBC 3.86*   HGB 9.8*   HCT 30.7*   PLT 90*     CMP:   Recent Labs   Lab 01/09/19 0536   *   CALCIUM 9.4   ALBUMIN 3.5   PROT 6.1      K 3.2*   CO2 39*   CL 88*   BUN 35*   CREATININE 1.9*   ALKPHOS 171*   ALT 25   AST 46*   BILITOT 2.0*     Coagulation:   Recent Labs   Lab 01/09/19 0536   INR 1.7*       Significant Imaging:  X-Ray: Reviewed  US: Reviewed

## 2019-01-09 NOTE — PROGRESS NOTES
[facility]  Hepatology  Progress Note    Patient Name: Hossein Sanchez Sr.  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 Patient was seen by Dr. Dumont yesterday in clinic. Patient reports worsening abdominal distention that started about three months ago. He denies any previous history of large volume paracentesis. He is on diuretics at home and is compliant. He otherwise feels okay except for abdominal distention. On routine labs checked he was noted to have an elevated Cr of 1.7 off his baseline of 0.9. He denies any alcohol use. On his most recent imaging on MR abdomen from 12/21 no residual tumor was seen.      Interval History:   UOP of 3L with mild improvement in Cr; otherwise no acute events.    Current Facility-Administered Medications   Medication    acetaminophen tablet 650 mg    amoxicillin capsule 500 mg    apixaban tablet 5 mg    cyanocobalamin tablet 1,000 mcg    dextrose 50% injection 12.5 g    dextrose 50% injection 25 g    finasteride tablet 5 mg    glucagon (human recombinant) injection 1 mg    glucose chewable tablet 16 g    glucose chewable tablet 24 g    insulin aspart U-100 pen 0-5 Units    iron polysaccharides capsule 150 mg    [START ON 1/10/2019] metoprolol succinate (TOPROL-XL) 24 hr tablet 25 mg    ondansetron disintegrating tablet 8 mg    potassium chloride SA CR tablet 40 mEq    pravastatin tablet 10 mg    sodium chloride 0.9% flush 5 mL    tamsulosin 24 hr capsule 0.4 mg    verapamil CR tablet 180 mg    vitamin D 1000 units tablet 2,000 Units       Objective:     Vital  Signs (Most Recent):  Temp: 96.7 °F (35.9 °C) (01/09/19 1152)  Pulse: 60 (01/09/19 1152)  Resp: 17 (01/09/19 1152)  BP: 127/61 (01/09/19 1152)  SpO2: 95 % (01/09/19 1152) Vital Signs (24h Range):  Temp:  [96.7 °F (35.9 °C)-98.3 °F (36.8 °C)] 96.7 °F (35.9 °C)  Pulse:  [52-70] 60  Resp:  [17-18] 17  SpO2:  [92 %-96 %] 95 %  BP: (127-137)/(60-92) 127/61     Weight: 108 kg (238 lb) (01/08/19 0535)  Body mass index is 40.85 kg/m².    Physical Exam   Constitutional: He is oriented to person, place, and time. No distress.   HENT:   Head: Normocephalic and atraumatic.   Eyes: No scleral icterus.   Cardiovascular: Normal rate and regular rhythm.   Pulmonary/Chest: Effort normal and breath sounds normal.   Abdominal: Soft. Bowel sounds are normal. He exhibits distension. He exhibits no mass. There is no tenderness. There is no rebound and no guarding. No hernia.   Musculoskeletal: He exhibits edema.   Neurological: He is alert and oriented to person, place, and time.   Skin: He is not diaphoretic.       MELD-Na score: 22 at 1/9/2019  5:36 AM  MELD score: 21 at 1/9/2019  5:36 AM  Calculated from:  Serum Creatinine: 1.9 mg/dL at 1/9/2019  5:36 AM  Serum Sodium: 136 mmol/L at 1/9/2019  5:36 AM  Total Bilirubin: 2 mg/dL at 1/9/2019  5:36 AM  INR(ratio): 1.7 at 1/9/2019  5:36 AM  Age: 78 years    Significant Labs:  CBC:   Recent Labs   Lab 01/09/19  0536   WBC 6.58   RBC 3.86*   HGB 9.8*   HCT 30.7*   PLT 90*     CMP:   Recent Labs   Lab 01/09/19 0536   *   CALCIUM 9.4   ALBUMIN 3.5   PROT 6.1      K 3.2*   CO2 39*   CL 88*   BUN 35*   CREATININE 1.9*   ALKPHOS 171*   ALT 25   AST 46*   BILITOT 2.0*     Coagulation:   Recent Labs   Lab 01/09/19  0536   INR 1.7*       Significant Imaging:  X-Ray: Reviewed  US: Reviewed    Assessment/Plan:     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 Patient overall better as it seems that his weight is back at baseline. Diuretics were stopped yesterday and there was a mild improvement in Cr today. We still favor holding off on diuretics today and seeing if there is additional improvement before deciding on a home regimen.    Recommendations:  --Daily CMP, CBC, and INR  --Monitor UOP and Cr  --Hold diuretics              Thank you for your consult. I will follow-up with patient. Please contact us if you have any additional questions.    Kaylen Snyder M.D.  Gastroenterology Fellow, PGY-V  Pager: [phone]  [facility]

## 2019-01-10 NOTE — ASSESSMENT & PLAN NOTE
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     Patient's Cr continues to improve and we will therefore start low dose oral diuretics in order to monitor his response over the next 24 hours.    Recommendations:  --Daily CMP, CBC, and INR  --Monitor UOP and Cr  --Daily weights  --Start Lasix 40 mg PO QDaily  --Start [place] 100 mg Po QDaily

## 2019-01-10 NOTE — PLAN OF CARE
Problem: Fall Injury Risk  Goal: Absence of Fall and Fall-Related Injury  Outcome: Ongoing (interventions implemented as appropriate)  Wife at bedside. Nonskid sock on. Fall wrist band on. Offer assistance with toileting every 2hrs. Room uncluttered. Room well lit. Will continue to monitor.

## 2019-01-10 NOTE — PROGRESS NOTES
Progress Note  Hospital Medicine  [facility]       Patient Name: Hossein Sanchez Sr.  MRN:  [id number]  Hospital Medicine Team: [facility] Indigo Rice MD  Date of Admission:  1/2/2019     Length of Stay:  LOS: 8 days   Expected Discharge Date: 1/11/2019  Principal Problem:  Anasarca     Subjective:     Interval History/Overnight Events:    IV diuretics held over the past 2 days. Cr of 1.7 today, baseline of 0.9, admission cR of 2. Lasix 40 and spironolactone 100 started today. Will monitor Cr tomorrow and plan for d/c if no acute issues    Otherwise, doing very well today. LE edema is well controlled. No acute issues. VSS     amoxicillin  500 mg Oral Q12H    apixaban  5 mg Oral BID    cyanocobalamin  1,000 mcg Oral Daily    finasteride  5 mg Oral Daily    furosemide  40 mg Oral Daily    iron polysaccharides  150 mg Oral BID    metoprolol succinate  25 mg Oral Daily    pravastatin  10 mg Oral Daily    spironolactone  100 mg Oral Daily    tamsulosin  0.4 mg Oral Daily    verapamil  180 mg Oral QHS    vitamin D  2,000 Units Oral Daily           acetaminophen, dextrose 50%, dextrose 50%, glucagon (human recombinant), glucose, glucose, insulin aspart U-100, ondansetron, sodium chloride 0.9%    Review of Systems   Constitutional: Negative for chills, fatigue, fever.   HENT: Negative for sore throat, trouble swallowing.    Eyes: Negative for photophobia, visual disturbance.   Respiratory: Negative for cough, shortness of breath.    Cardiovascular: Negative for chest pain, palpitations, leg swelling.   Gastrointestinal: Negative for abdominal pain, constipation, diarrhea, nausea, vomiting.   Endocrine: Negative for cold intolerance, heat intolerance.   Genitourinary: Negative for dysuria, frequency.   Musculoskeletal: Negative for arthralgias, myalgias.   Skin: Negative for rash, wound, erythema   Neurological: Negative for dizziness, syncope, weakness, light-headedness.    Psychiatric/Behavioral: Negative for confusion, hallucinations, anxiety  All other systems reviewed and are negative.    Objective:     Temp:  [97.2 °F (36.2 °C)-98.8 °F (37.1 °C)]   Pulse:  [66-84]   Resp:  [16-18]   BP: (109-147)/(61-73)   SpO2:  [91 %-95 %]       Physical Exam:  Constitutional: appears chronically ill, non-distressed, not diaphoretic.   HENT: NC/AT, external ears normal, oropharynx clear, MMM w/o exudates.   Eyes: PERRL, EOMI, conjunctiva normal, no discharge b/l, no scleral icterus   Neck: normal ROM, supple   CV: RRR, no m/r/g, no carotid bruits, +2 peripheral pulses.  Pulmonary/Chest wall: Breathing comfortably w/o distress, CTAB, no w/r/r, no crackles.  GI: Soft, non-tender, mildly distended, (+) BS, (+) BM   Musculoskeletal: Normal ROM, 1+ pitting edema on mid calf- pt's baseline ,  no tenderness throughout   Neurological: AAO x 4, CN II-XI in tact,  Skin: warm, dry, (-) erythema, (-) rash, (+)pallor  Psych: normal mood and affect, normal behavior, thought content and judgement.    Labs:    Chemistries:   Recent Labs   Lab 01/08/19 0614 01/09/19  0536 01/10/19  0403    136 136   K 3.5 3.2* 3.9   CL 88* 88* 92*   CO2 38* 39* 33*   BUN 35* 35* 35*   CREATININE 2.0* 1.9* 1.7*   CALCIUM 9.6 9.4 9.3   PROT 6.3 6.1 5.8*   BILITOT 2.1* 2.0* 2.0*   ALKPHOS 167* 171* 171*   ALT 23 25 26   AST 45* 46* 47*   MG 1.9 1.6 1.6   PHOS 3.7 2.9 2.6*        WBC:   Recent Labs   Lab 01/06/19  0452 01/07/19  0431 01/08/19  0614 01/09/19  0536 01/10/19  0403   WBC 8.42 8.56 7.23 6.58 6.17     Bands:     CBC/Anemia Labs: Coags:    Recent Labs   Lab 01/08/19  0614 01/09/19  0536 01/10/19  0403   WBC 7.23 6.58 6.17   HGB 9.4* 9.8* 9.8*   HCT 29.7* 30.7* 30.9*   PLT 91* 90* 83*   MCV 78* 80* 81*   RDW 23.4* 23.1* 23.4*    Recent Labs   Lab 01/08/19 0614 01/09/19  0536 01/10/19  0403   INR 1.7* 1.7* 1.6*          Assessment and Plan     Hospital Course:    Mr. Hossein Sanchez Sr. 78 y.oAdri lentz with HCC  (s/p radioembolization), DRISCOLL cirrhosis (MELd of 18, not liver tx candidate), Lung CA (adenoCA in situ s/p SBRT), persistent Atrial Fibrillation on Xarelto, NIDDM-2 and iron deficiency anemia who presented on 1/2 to [facility] from Hepatology clinic for YEMI, with Cr increase to 1.7 from 0.9 prior. Also found to have volume overload and anasarca and was started on aggressive diuresis with IV lasix ggt, with subsequent increase in Cr to 2.0. S/p paracentesis on 1/3 with almost 5 L drained    Active Hospital Problems    Diagnosis  POA    *Anasarca [R60.1]  Yes    Acute cystitis without hematuria [N30.00]  Yes    Enterococcus UTI [N39.0, B95.2]  Yes    Acute diastolic heart failure [I50.31]  Yes    Decompensated hepatic cirrhosis [K72.90]  Yes    Morbid obesity [E66.01]  Yes    Anemia of chronic disease [D63.8]  Yes    Thrombocytopenia [D69.6]  Yes    Acute kidney injury [N17.9]  Yes    Vitamin D deficiency [E55.9]  Yes    Iron deficiency anemia [D50.9]  Yes    Benign prostatic hyperplasia with urinary hesitancy [N40.1, R39.11]  Yes    Type 2 diabetes mellitus without complication, without long-term current use of insulin [E11.9]  Yes    Chronic a-fib [I48.2]  Yes    HCC (hepatocellular carcinoma) [C22.0]  Yes    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       # YEMI  - Cr 1.7 on admit with normal baseline; likely due to fluid overload; 2.0  -1.9 now  - renal U/S and urine lytes reviewed   - stopped lasix drip  and spironolactone when Cr increased to 2  - monitor UOP and Cr  - PO lasix 40 and spironolactone 100 started on 1/10  - monitor      # Acute diastolic heart failure  # Anasarca  - TTE shows diastolic heart failure  -  diuretics as above      # Decompensated DRISCOLL Cirrhosis with ascites  # HCC  MELD-Na score: 20 at 1/10/2019  4:03 AM  MELD score: 19 at 1/10/2019  4:03 AM  Calculated from:  Serum Creatinine: 1.7 mg/dL at  1/10/2019  4:03 AM  Serum Sodium: 136 mmol/L at 1/10/2019  4:03 AM  Total Bilirubin: 2 mg/dL at 1/10/2019  4:03 AM  INR(ratio): 1.6 at 1/10/2019  4:03 AM  Age: 78 years  - HCC has been treated;Liver U/S shows multiple masses; had recent MRI abdomen  - hepatology following   - diuretics as above. Monitoring renal function      # Essential HTN  - adjust verapamil to 180mg from 300 given aashish      #  Atrial fibrillation with RVR  - metoprolol succ 25 mg PO  daily ( adjusted based on HR/ aashish); RVR now resolved  - and cont eliquis  - adjust verapamil to 180mg from 300 given aashish      # DM2 without complication   - cont insulin  - Hba1c 5.3     # Acute cystitis without hematuria due to enterococcus   - patient on augmentin for a total of 10 days     # Anemia of chronic disease  # Thrombocytopenia  # Coagulopathy  - hbg of 10 which is baseline, plts of 90s which is baseline, INR of 1.7 in setting of cirrhosis  - cont apixaban and monitor labs. Monitor for bleeding     # Hypomagnesemia/Hypokalemia  - replaced     # Morbid Obesity  Body mass index is 40.85 kg/m².    Diet:  cardiac  GI PPx:  -  DVT PPx:  On apixaban   Goals of Care:  full    High Risk Conditions:  Anasarca     Disposition:    Likely d/c home on 1/11, no needs. Hepatology f/u    Signing Physician:     Indigo Rice MD  Department of Hospital Medicine   [facility]  Pager [phone]  1/10/2019

## 2019-01-10 NOTE — NURSING
Reviewed hypo and hyperglycemia and knows 3 s/s of each and what A1C stands for and what the test represents. Stated he does his blood sugar at home daily. Assessment on going

## 2019-01-10 NOTE — SUBJECTIVE & OBJECTIVE
Interval History:   No acute events overnight.  No weight or UOP recorded but Cr improving.    Current Facility-Administered Medications   Medication    acetaminophen tablet 650 mg    amoxicillin capsule 500 mg    apixaban tablet 5 mg    cyanocobalamin tablet 1,000 mcg    dextrose 50% injection 12.5 g    dextrose 50% injection 25 g    finasteride tablet 5 mg    furosemide tablet 40 mg    glucagon (human recombinant) injection 1 mg    glucose chewable tablet 16 g    glucose chewable tablet 24 g    insulin aspart U-100 pen 0-5 Units    iron polysaccharides capsule 150 mg    metoprolol succinate (TOPROL-XL) 24 hr tablet 25 mg    ondansetron disintegrating tablet 8 mg    pravastatin tablet 10 mg    sodium chloride 0.9% flush 5 mL    spironolactone tablet 100 mg    tamsulosin 24 hr capsule 0.4 mg    verapamil CR tablet 180 mg    vitamin D 1000 units tablet 2,000 Units       Objective:     Vital Signs (Most Recent):  Temp: 97.2 °F (36.2 °C) (01/10/19 1746)  Pulse: 72 (01/10/19 1746)  Resp: 18 (01/10/19 1746)  BP: 137/67 (01/10/19 1746)  SpO2: (!) 94 % (01/10/19 1746) Vital Signs (24h Range):  Temp:  [97.2 °F (36.2 °C)-98.8 °F (37.1 °C)] 97.2 °F (36.2 °C)  Pulse:  [66-84] 72  Resp:  [16-18] 18  SpO2:  [93 %-95 %] 94 %  BP: (109-147)/(61-73) 137/67     Weight: 108 kg (238 lb) (01/08/19 0535)  Body mass index is 40.85 kg/m².    Physical Exam   Constitutional: He is oriented to person, place, and time. No distress.   HENT:   Head: Normocephalic and atraumatic.   Eyes: No scleral icterus.   Cardiovascular: Normal rate and regular rhythm.   Pulmonary/Chest: Effort normal and breath sounds normal.   Abdominal: Soft. Bowel sounds are normal. He exhibits distension. He exhibits no mass. There is no tenderness. There is no rebound and no guarding. No hernia.   Musculoskeletal: He exhibits edema.   Neurological: He is alert and oriented to person, place, and time.   Skin: He is not diaphoretic.       MELD-Na  score: 20 at 1/10/2019  4:03 AM  MELD score: 19 at 1/10/2019  4:03 AM  Calculated from:  Serum Creatinine: 1.7 mg/dL at 1/10/2019  4:03 AM  Serum Sodium: 136 mmol/L at 1/10/2019  4:03 AM  Total Bilirubin: 2 mg/dL at 1/10/2019  4:03 AM  INR(ratio): 1.6 at 1/10/2019  4:03 AM  Age: 78 years    Significant Labs:  CBC:   Recent Labs   Lab 01/10/19  0403   WBC 6.17   RBC 3.83*   HGB 9.8*   HCT 30.9*   PLT 83*     CMP:   Recent Labs   Lab 01/10/19  0403   *   CALCIUM 9.3   ALBUMIN 3.2*   PROT 5.8*      K 3.9   CO2 33*   CL 92*   BUN 35*   CREATININE 1.7*   ALKPHOS 171*   ALT 26   AST 47*   BILITOT 2.0*     Coagulation:   Recent Labs   Lab 01/10/19  0403   INR 1.6*       Significant Imaging:  X-Ray: Reviewed  US: Reviewed

## 2019-01-10 NOTE — PROGRESS NOTES
[facility]  Hepatology  Progress Note    Patient Name: Hossein Sanchez Sr.  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 Patient was seen by Dr. Dumont yesterday in clinic. Patient reports worsening abdominal distention that started about three months ago. He denies any previous history of large volume paracentesis. He is on diuretics at home and is compliant. He otherwise feels okay except for abdominal distention. On routine labs checked he was noted to have an elevated Cr of 1.7 off his baseline of 0.9. He denies any alcohol use. On his most recent imaging on MR abdomen from 12/21 no residual tumor was seen.      Interval History:   No acute events overnight.  No weight or UOP recorded but Cr improving.    Current Facility-Administered Medications   Medication    acetaminophen tablet 650 mg    amoxicillin capsule 500 mg    apixaban tablet 5 mg    cyanocobalamin tablet 1,000 mcg    dextrose 50% injection 12.5 g    dextrose 50% injection 25 g    finasteride tablet 5 mg    furosemide tablet 40 mg    glucagon (human recombinant) injection 1 mg    glucose chewable tablet 16 g    glucose chewable tablet 24 g    insulin aspart U-100 pen 0-5 Units    iron polysaccharides capsule 150 mg    metoprolol succinate (TOPROL-XL) 24 hr tablet 25 mg    ondansetron disintegrating tablet 8 mg    pravastatin tablet 10 mg    sodium chloride 0.9% flush 5 mL    spironolactone tablet 100 mg    tamsulosin 24 hr capsule 0.4 mg    verapamil CR tablet 180 mg    vitamin D 1000 units tablet 2,000 Units       Objective:     Vital  Signs (Most Recent):  Temp: 97.2 °F (36.2 °C) (01/10/19 1746)  Pulse: 72 (01/10/19 1746)  Resp: 18 (01/10/19 1746)  BP: 137/67 (01/10/19 1746)  SpO2: (!) 94 % (01/10/19 1746) Vital Signs (24h Range):  Temp:  [97.2 °F (36.2 °C)-98.8 °F (37.1 °C)] 97.2 °F (36.2 °C)  Pulse:  [66-84] 72  Resp:  [16-18] 18  SpO2:  [93 %-95 %] 94 %  BP: (109-147)/(61-73) 137/67     Weight: 108 kg (238 lb) (01/08/19 0535)  Body mass index is 40.85 kg/m².    Physical Exam   Constitutional: He is oriented to person, place, and time. No distress.   HENT:   Head: Normocephalic and atraumatic.   Eyes: No scleral icterus.   Cardiovascular: Normal rate and regular rhythm.   Pulmonary/Chest: Effort normal and breath sounds normal.   Abdominal: Soft. Bowel sounds are normal. He exhibits distension. He exhibits no mass. There is no tenderness. There is no rebound and no guarding. No hernia.   Musculoskeletal: He exhibits edema.   Neurological: He is alert and oriented to person, place, and time.   Skin: He is not diaphoretic.       MELD-Na score: 20 at 1/10/2019  4:03 AM  MELD score: 19 at 1/10/2019  4:03 AM  Calculated from:  Serum Creatinine: 1.7 mg/dL at 1/10/2019  4:03 AM  Serum Sodium: 136 mmol/L at 1/10/2019  4:03 AM  Total Bilirubin: 2 mg/dL at 1/10/2019  4:03 AM  INR(ratio): 1.6 at 1/10/2019  4:03 AM  Age: 78 years    Significant Labs:  CBC:   Recent Labs   Lab 01/10/19  0403   WBC 6.17   RBC 3.83*   HGB 9.8*   HCT 30.9*   PLT 83*     CMP:   Recent Labs   Lab 01/10/19  0403   *   CALCIUM 9.3   ALBUMIN 3.2*   PROT 5.8*      K 3.9   CO2 33*   CL 92*   BUN 35*   CREATININE 1.7*   ALKPHOS 171*   ALT 26   AST 47*   BILITOT 2.0*     Coagulation:   Recent Labs   Lab 01/10/19  0403   INR 1.6*       Significant Imaging:  X-Ray: Reviewed  US: Reviewed    Assessment/Plan:     Liver cirrhosis secondary to DRISCOLL    78 year old male with DRISCOLL cirrhosis c/b HCC (treated with Y-90), 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     Patient's Cr continues to improve and we will therefore start low dose oral diuretics in order to monitor his response over the next 24 hours.    Recommendations:  --Daily CMP, CBC, and INR  --Monitor UOP and Cr  --Daily weights  --Start Lasix 40 mg PO QDaily  --Start [place] 100 mg Po QDaily             Thank you for your consult. I will follow-up with patient. Please contact us if you have any additional questions.    Kaylen Snyder M.D.  Gastroenterology Fellow, PGY-V  Pager: [phone]  [facility]

## 2019-01-11 NOTE — DISCHARGE SUMMARY
"DISCHARGE SUMMARY  Hospital Medicine    [place] [facility] [place]    Patient Name: Hossein Sanchez Sr.  YOB: 1940    Admit Date: 1/2/2019    Discharge Date: 01/11/2019    Discharge Attending Physician: Indigo Rice MD     Chief Complaint: Anasarca      Princilpal Diagnoses:  Active Hospital Problems    Diagnosis  POA    *Anasarca [R60.1]  Yes    Acute cystitis without hematuria [N30.00]  Yes    Enterococcus UTI [N39.0, B95.2]  Yes    Acute diastolic heart failure [I50.31]  Yes    Decompensated hepatic cirrhosis [K72.90]  Yes    Morbid obesity [E66.01]  Yes    Anemia of chronic disease [D63.8]  Yes    Thrombocytopenia [D69.6]  Yes    Acute kidney injury [N17.9]  Yes    Vitamin D deficiency [E55.9]  Yes    Iron deficiency anemia [D50.9]  Yes    Benign prostatic hyperplasia with urinary hesitancy [N40.1, R39.11]  Yes    Type 2 diabetes mellitus without complication, without long-term current use of insulin [E11.9]  Yes    Chronic a-fib [I48.2]  Yes    HCC (hepatocellular carcinoma) [C22.0]  Yes    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       Discharged Condition: Admit problems have resolved      HOSPITAL COURSE:      Initial Presentation:    Per admitting provider   78 y.o. 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  History is provided by the patient and his wife, who is at bedside.  Patient states he has been in his usual state of health until the past 3 months.  Overall, he has noticed increased abdominal bloating over a gradual course.  This was associated with mild pain that he describes as "pins and needles", but otherwise did not cause significant issues to his quality of life.  Over this time " period, he had noticed not only increased swelling there, but all across his body, including his legs.  Does take his home diuretic of lasix 40 mg PO daily and is adherent to it.  Associated symptoms include decreased appetite over this period, as well as weight gain (typically 248, now 270+), and mild non-productive cough.  He at baseline has urinary issues from BPH with no new change.  Denies fevers, chest pains, headaches, or change in bowel habits, or neurological symptoms.  He presented to hepatology clinic for follow up, where he was found to have a new onset YEMI with CMP on 1/2/2019 revealing a Cr of 1.7 with a baseline of 0.9.  He admitted to clinic to hospital medicine for further assessment of YEMI and anasarca.       States he is a non smoker (never smoked) and a previous social drinker who had quit many years ago.  [place] with [place] and [place]ology [place] [facility] [place]  Last visit with oncology on 12/2018 noted that previous PET CT on 11/2018 that inferior R lower liver was isointense.  Hepatology visit today noted no residual HCC.  Notes that he had completed his radiation therapy recently for his lung CA.  Noted to have numerous blood transfusions previously and has numerous colonoscopies (last since 2017) and VCE to work up for GI bleed.        Course of Principle Problem for Admission:    Hospital Course:    Mr. Hossein Sanchez Sr. 78 y.o. gentleman with HCC (s/p radioembolization), DRISCOLL cirrhosis (MELd of 18, not liver tx candidate), Lung CA (adenoCA in situ s/p SBRT), persistent Atrial Fibrillation on Xarelto, NIDDM-2 and iron deficiency anemia who presented on 1/2 to [facility] [place]ology clinic for YEMI, with Cr increase to 1.7 from 0.9 prior. Also found to have volume overload and anasarca and was started on aggressive diuresis with IV lasix ggt, with subsequent increase in Cr to 2.0. S/p paracentesis on 1/3 with almost 5 L drained. Diuretics held and restarted on PO diuretics when Cr decreased to 1.7,  tolerated well with Cr subsequently at 1.6    # Acute diastolic heart failure  # Anasarca  - anasarca due to ADHF and decomp cirrhosis  - TTE showed diastolic heart failure  - diuretics  restarted at lasix 40 daily and aldactone 100 daily     # Decompensated DRISCOLL Cirrhosis with ascites  # HCC  MELD-Na score: 20 at 1/11/2019  4:59 AM  MELD score: 19 at 1/11/2019  4:59 AM  Calculated from:  Serum Creatinine: 1.6 mg/dL at 1/11/2019  4:59 AM  Serum Sodium: 136 mmol/L at 1/11/2019  4:59 AM  Total Bilirubin: 1.9 mg/dL at 1/11/2019  4:59 AM  INR(ratio): 1.6 at 1/11/2019  4:59 AM  Age: 78 years    - HCC has been treated;Liver U/S shows multiple masses; had recent MRI abdomen  - hepatology following   - diuretics  restarted at lasix 40 daily and aldactone 100 daily     On the day of d/c, Cr was 1.6 after pt restarted on lasix 40 daily and aldactone 100 daily . Doing well with no acute events. Discharged in good condition, with improved edema but with some residual that has been his prior baseline       Physical Exam:  Constitutional: appears chronically ill, non-distressed, not diaphoretic.   HENT: NC/AT, external ears normal, oropharynx clear, MMM w/o exudates.   Eyes: PERRL, EOMI, conjunctiva normal, no discharge b/l, no scleral icterus   Neck: normal ROM, supple   CV: RRR, no m/r/g, no carotid bruits, +2 peripheral pulses.  Pulmonary/Chest wall: Breathing comfortably w/o distress, CTAB, no w/r/r, no crackles.  GI: Soft, non-tender, mildly distended, (+) BS, (+) BM   Musculoskeletal: Normal ROM, 1+ pitting edema on mid calf- pt's baseline ,  no tenderness throughout   Neurological: AAO x 4, CN II-XI in tact,  Skin: warm, dry, (-) erythema, (-) rash, (+)pallor  Psych: normal mood and affect, normal behavior, thought content and judgement.        Other Medical Problems Addressed in the Hospital:    # YEMI  - Cr 1.7 on admit with normal baseline; likely due to fluid overload; 2.0  -1.9 now  - renal U/S and urine lytes reviewed    - stopped lasix drip  and spironolactone when Cr increased to 2  - PO lasix 40 and spironolactone 100 started on 1/10, Cr remained at 1.6    # Essential HTN  - adjust verapamil to 180mg from 300 given aashish      #  Atrial fibrillation with RVR  - metoprolol succ 25 mg PO  daily ( adjusted based on HR/ aashish); RVR now resolved  - and cont eliquis  - adjust verapamil to 180mg from 300 given aashish      # DM2 without complication   - cont insulin  - Hba1c 5.3     # Acute cystitis without hematuria due to enterococcus   - patient on augmentin for a total of 10 days     # Anemia of chronic disease  # Thrombocytopenia  # Coagulopathy  - hbg of 10 which is baseline, plts of 90s which is baseline, INR of 1.7 in setting of cirrhosis  - cont apixaban and monitor labs. Monitor for bleeding      # Hypomagnesemia/Hypokalemia  - replaced     # Morbid Obesity  -Body mass index is 40.85 kg/m².      CONSULTS: hepatology     PROCEDURES: paracentesis on 1/3    Labs:    Chemistries:   Recent Labs   Lab 01/09/19  0536 01/10/19  0403 01/11/19  0459    136 136   K 3.2* 3.9 3.7   CL 88* 92* 93*   CO2 39* 33* 33*   BUN 35* 35* 31*   CREATININE 1.9* 1.7* 1.6*   CALCIUM 9.4 9.3 9.7   PROT 6.1 5.8* 6.1   BILITOT 2.0* 2.0* 1.9*   ALKPHOS 171* 171* 195*   ALT 25 26 30   AST 46* 47* 54*   MG 1.6 1.6 1.5*   PHOS 2.9 2.6* 2.6*        WBC:   Recent Labs   Lab 01/07/19  0431 01/08/19  0614 01/09/19  0536 01/10/19  0403 01/11/19 0459   WBC 8.56 7.23 6.58 6.17 6.55     Bands:     CBC/Anemia Labs: Coags:    Recent Labs   Lab 01/09/19 0536 01/10/19  0403 01/11/19 0459   WBC 6.58 6.17 6.55   HGB 9.8* 9.8* 9.9*   HCT 30.7* 30.9* 31.9*   PLT 90* 83* 83*   MCV 80* 81* 81*   RDW 23.1* 23.4* 23.5*    Recent Labs   Lab 01/09/19  0536 01/10/19  0403 01/11/19  0459   INR 1.7* 1.6* 1.6*        Diagnostic Results:    Disposition:  Home      Future Scheduled Appointments:  Future Appointments   Date Time Provider Department Center   2/13/2019  9:00 AM JACINTA  OI-CT1 500 LB LIMIT [facility]   2/13/2019  9:15 AM [facility] OI-CT1 500 LB LIMIT [facility]   2/13/2019  9:30 AM LAB, HEMONC CANCER BLDG [facility] LAB HO Ankush Waite   2/13/2019 10:40 AM Monica Ahmadi MD [facility] HEM ONC Ankush Lynndk       Follow-up Plans from This Hospitalization:      Discharge Medication List:       Hossein Sanchez Sr.   Home Medication Instructions FITO:[id number]    Printed on:01/11/19 2256   Medication Information                      apixaban (ELIQUIS) 5 mg Tab  Take 5 mg by mouth 2 (two) times daily.             cyanocobalamin (VITAMIN B-12) 1000 MCG tablet  Take 100 mcg by mouth once daily.             finasteride (PROSCAR) 5 mg tablet  Take 5 mg by mouth once daily.              furosemide (LASIX) 40 MG tablet  Take 40 mg by mouth once daily.              iron polysaccharides (NIFEREX) 150 mg iron Cap  Take 150 mg by mouth 2 (two) times daily.             metformin (GLUCOPHAGE) 500 MG tablet  Take 1,000 mg by mouth 2 (two) times daily with meals.              metoprolol succinate (TOPROL-XL) 25 MG 24 hr tablet  Take 1 tablet (25 mg total) by mouth once daily.             MULTIVIT-IRON-MIN-FOLIC ACID 3,500-18-0.4 UNIT-MG-MG ORAL CHEW  Take by mouth.             pantoprazole (PROTONIX) 40 MG tablet  Take 40 mg by mouth once daily.             pravastatin (PRAVACHOL) 10 MG tablet  Take 10 mg by mouth once daily.             spironolactone (ALDACTONE) 100 MG tablet  Take 1 tablet (100 mg total) by mouth once daily.             tamsulosin (FLOMAX) 0.4 mg Cp24  Take 0.8 mg by mouth every evening.              verapamil (CALAN-SR) 180 MG CR tablet  Take 1 tablet (180 mg total) by mouth every evening.             vitamin D 1000 units Tab  Take 2,000 Units by mouth once daily.                   At the time of discharge patient was told to take all medications as prescribed, to keep all followup appointments, and to call their primary care physician or return to the emergency room  if they have any worsening or concerning symptoms.    >35 min spent on coordination of care for this patient's discharge      Signing Physician:  Indigo Rice MD

## 2019-01-11 NOTE — PLAN OF CARE
Problem: Adult Inpatient Plan of Care  Goal: Plan of Care Review  Outcome: Ongoing (interventions implemented as appropriate)  Patient alert and oriented. Verbalizes needs. Diuresis done during stay and plan is possible discharge tomorrow as stated per patient and wife. Ambulatory to and from bathroom. Eats well. Hydrates well. Denied pain or discomfort. Blood sugars monitored. No fall or occurrences noted. Assessment on going.

## 2019-01-11 NOTE — TELEPHONE ENCOUNTER
Received a message from Dr. Snyder to arrange labs for mon or tues and clinic in 2 weeks.  Rachel notified and will schedule and call patient.

## 2019-01-11 NOTE — PLAN OF CARE
Problem: Adult Inpatient Plan of Care  Goal: Plan of Care Review  Outcome: Outcome(s) achieved Date Met: 01/11/19  Patient alert and oriented , left with all belongings in wheelchair. Wife at side. Denied pain. Tolerated all am medications well. Tolerated iv magnesium well. Iv removed, no redness,edema or free bleeding. No fall or occurrences. Blood sugars monitored. No insulin needed. Assessment on going. Reviewed all discharge instructions and medications and follow up appointments . He verbalized understanding.

## 2019-01-11 NOTE — PLAN OF CARE
Problem: Adult Inpatient Plan of Care  Goal: Plan of Care Review  Outcome: Ongoing (interventions implemented as appropriate)   01/11/19 0132   Plan of Care Review   Plan of Care Reviewed With patient   Pt remains free of falls. Care plan reviewed with pt , understanding voiced, will cont to monitor.

## 2019-01-14 NOTE — PLAN OF CARE
01/11/19 1505   Final Note   Assessment Type Final Discharge Note   Anticipated Discharge Disposition Home   Hospital Follow Up  Appt(s) scheduled? Yes   Discharge plans and expectations educations in teach back method with documentation complete? Yes     Future Appointments   Date Time Provider Department Center   1/17/2019  8:20 AM LAB, APPOINTMENT MYLA ORTETO [facility] LAB VNP [facility]   1/17/2019  9:20 AM Burak Dunn PA-C [facility] HEPAT [address]   2/13/2019  9:00 AM [facility] OI-CT1 500 LB LIMIT [facility]   2/13/2019  9:15 AM [facility] OIC-CT1 500 LB LIMIT [facility]   2/13/2019  9:30 AM LAB, HEMONC CANCER BLDG [facility] LAB IMELDA [facility]ite   2/13/2019 10:40 AM Monica Ahmadi MD [facility] HEM ONC Ankush Waite

## 2019-01-15 NOTE — TELEPHONE ENCOUNTER
MA called patient spoke to patient wife reschedule patient follow up visit in [facility] 1/17 she accepted the appt.

## 2019-01-15 NOTE — TELEPHONE ENCOUNTER
----- Message from Burak Dunn PA-C sent at 1/15/2019  8:10 AM CST -----  Can you have him come for [facility] on my open afternoon slot. He needs to be seen by an MD since he's post hospital    Thanks

## 2019-01-17 NOTE — PROGRESS NOTES
"HEPATOLOGY CLINIC VISIT NOTE     REASON FOR VISIT: post hospital     HISTORY: This is a 78 y.o. White male here for post hospital discharge. He is an established patient of Dr. Dumont. Recent admission for anasarca and YEMI.     H/o HCC, s/p y90 x 3. H/o lung adenocarcinoma.     Weight down ~30lbs since discharge. Discharged on lasix 40mg and spironolactone 100mg. He reports compliance with low Na diet.     Loss of strength and stamina since IP x 10 days     Black stool x 1 month; ferrox supplement, procrit. His last EGD was "last summer."     HCC  12/21/18: MRI - no residual, repeat MRI in 3 months - March 2019 9/25/18: left lobe TARE - y-90  3/8/18: right lobe retreatment TARE - y-90 8/8/18: right lobeTARE - y-90    MELD-Na score: 20 at 1/17/2019 12:08 PM  MELD score: 18 at 1/17/2019 12:08 PM  Calculated from:  Serum Creatinine: 1.6 mg/dL at 1/17/2019 12:08 PM  Serum Sodium: 134 mmol/L at 1/17/2019 12:08 PM  Total Bilirubin: 1.8 mg/dL at 1/17/2019 12:08 PM  INR(ratio): 1.5 at 1/17/2019 12:08 PM  Age: 78 years    Past Medical History:   Diagnosis Date    Anemia     Cancer     Diabetes mellitus     Encounter for blood transfusion     GI bleed 8/6/2003    Hypertension     Liver carcinoma     Obstructive sleep apnea on CPAP     Sleep apnea 12/18/15    Stroke 2014    Stroke 7/16/14    TIA (transient ischemic attack) 2/11/15     Past Surgical History:   Procedure Laterality Date    APPENDECTOMY  teen    OSBRUT-[id number]-ZX N/A 4/8/2016    Performed by [facility] OR 2ND FLR    BIOPSY-LIVER-LAPAROSCOPIC N/A 4/25/2016    Performed by Narinder Ch[facility] at [facility] OR 2ND FLR    BONE MARROW BIOPSY      CATARACT EXTRACTION  12/10/11    CHOLECYSTECTOMY      CHOLECYSTECTOMY-LAPAROSCOPIC  4/25/2016    Performed by [facility] OR 2ND FLR    COLONOSCOPY  12/21/15    EMBOLIZATION, ARTERY, LIVER, FOR SELECTIVE INTERNAL RADIATION THERAPY USING YTTRIUM-90 MICROSPHERES N/A " 9/25/2018    Performed by [facility] at [facility] OR 2ND FLR    EMBOLIZATION, BLOOD VESSEL N/A 8/8/2018    Performed by [facility] Provider at [facility] OR [facility] FLR    Embolization, Yttrium Therapy N/A 7/18/2018    Performed by [facility] Provider [facility] OR 2ND FLR    Embolization, Yttrium Therapy N/A 3/29/2018    Performed by [facility] Provider [facility] OR [facility] FLR    Embolization, Yttrium Therapy N/A 3/8/2018    Performed by [facility] Provider [facility] OR [facility] FLR    JOINT REPLACEMENT Bilateral     knees    KNEE SURGERY Right replacement    KNEE SURGERY Left     TONSILLECTOMY       FAMILY HISTORY: Negative for liver disease    SOCIAL HISTORY:   Social History     Tobacco Use   Smoking Status Never Smoker   Smokeless Tobacco Never Used       Social History     Substance and Sexual Activity   Alcohol Use No       Social History     Substance and Sexual Activity   Drug Use No     ROS:   No fever, chills, (+)weight loss, (+) fatigue  No chest pain, dyspnea, cough  No abdominal pain,  nausea, vomiting  No headaches, visual changes  (+) mild lower extremity edema  No depression or anxiety      PHYSICAL EXAM:  Friendly White male, in no acute distress; alert and oriented to person, place and time  VITALS: reviewed  HEENT: Sclerae anicteric.   NECK: Supple  CVS: Regular rate and rhythm. No murmurs  LUNGS: Normal respiratory effort. (+) wheeze  ABDOMEN: Flat, soft, nontender. No organomegaly or masses. No ascites or hernias  SKIN: Warm and dry. No jaundice, No obvious rashes.   EXTREMITIES: (+) trace lower extremity edema  NEURO/PSYCH: Normal gate. Memory intact. Thought and speech pattern appropriate. Behavior normal. No depression or anxiety noted.    RECENT LABS:  Lab Results   Component Value Date    WBC 6.55 01/11/2019    HGB 9.9 (L) 01/11/2019    PLT 83 (L) 01/11/2019     Lab Results   Component Value Date    INR 1.6 (H) 01/11/2019     Lab Results   Component Value Date    AST 54 (H)  01/11/2019    ALT 30 01/11/2019    BILITOT 1.9 (H) 01/11/2019    ALBUMIN 3.3 (L) 01/11/2019    ALKPHOS 195 (H) 01/11/2019    CREATININE 1.6 (H) 01/11/2019    BUN 31 (H) 01/11/2019     01/11/2019    K 3.7 01/11/2019    AFP 10 (H) 01/02/2019         RECENT IMAGING:  MRI Abdomen W WO Contrast   Order: [id number]   Status:  Final result   Visible to patient:  Yes (Patient Portal) Next appt:  02/13/2019 at 09:00 AM in Radiology ([facility] OI-CT1 500 LB LIMIT) Dx:  Hepatocellular carcinoma   Details     Reading Physician Reading Date Result Priority   Prabhjot Curry MD 12/21/2018    Ab Tucker MD 12/21/2018       Narrative     EXAMINATION:  MRI ABDOMEN W WO CONTRAST    CLINICAL HISTORY:  HCC s/p Y90;  Liver cell carcinoma    TECHNIQUE:  Multiplanar multisequence images of the abdomen before and after administration of 10 mL Gadavist intravenous contrast.    COMPARISON:  PET CT 11/06/2018, MRI abdomen 06/29/2018    FINDINGS:  Inferior Thorax: Normal.    Liver: The right hepatic lobe is small in size with compensatory hypertrophy of the left hepatic lobe.  There is redemonstration of extensive postprocedural changes involving hepatic segments V/VI and VIII/IV.  In hepatic segment V/VI, there is reduced in size from examination dated 06/29/2018 now measuring 6.7 x 5.7 cm on axial series 13, image 65 (previously 9.2 x 5.4 cm).  This mass demonstrates irregular peripheral enhancement without appreciable washout to suggest residual/recurrent disease.    Additional mass within hepatic segment VIII/IV is slightly reduced in size measuring 5.3 x 4.6 cm on axial series 11, image 29 (previously 5.9 x 6.3 cm).  Peripheral enhancement about the treatment cavity is reduced from examination dated 06/29/2018 with a persistent small focus of nodular enhancement along the anteromedial aspect measuring approximately 1.1 cm on axial series 9, image 34 (previously 1.9 cm), which does not washout.    Subcentimeter lesion within  hepatic segment II demonstrates no appreciable enhancement on today's examination.    Gallbladder: Surgically absent.    Bile Ducts: No dilatation.    Pancreas: Involutional change without pancreatic mass lesion or peripancreatic inflammation.    Spleen: Enlarged with multiple splenic collaterals.    Adrenals: Normal.    Kidneys/Ureters: Kidneys are normal in size and location demonstrating appropriate concentration of contrast material.  Stable cyst along the left lower pole.    GI Tract/Mesentery: Visualized loops of small and large bowel are normal in caliber without evidence for obstruction or inflammation.    Peritoneal Space: There is a moderate volume of abdominal ascites increased in volume from examination dated 06/29/2018    Retroperitoneum: No significant lymphadenopathy.    Abdominal wall: There is diffuse body wall edema most prominent overlying the left flank.    Vasculature: Normal.    Bones: No acute fracture or infiltrative marrow replacement process.      Impression       Extensive post treatment change in the liver, noting areas of arterial portal shunting, delayed enhancement/scarring, and non enhancement.  Findings are similar to the 06/29/2018 exam.  No discrete evidence of residual tumor identified.    Moderate ascites, increased.    Additional stable findings as above.             ASSESSMENT  78 y.o. White male with:  1. 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 ANASARCA  -- improving  -- K slightly elevated, will reduce barbara to 50mg and continue lasix 40mg    3. HCC  -- MRI due 03/2019  -- no residual on 12/208 MRI     PLAN:  1. Reduce  spironolactone to 50mg  2. F/u w/ Dr. Dumont 02/2019  3. MRI due 03/2019     Thank you for allowing me to participate in the care of Hossein Dunn PA-C

## 2019-02-13 NOTE — PROGRESS NOTES
HEPATOLOGY CLINIC VISIT NOTE     REASON FOR VISIT: post hospital     HISTORY: This is a 78 y.o. White male here for follow up. He is an established patient of Dr. Dumont. Recent admission for anasarca and YEMI. He has been doing well since admission, weight further down 10 lbs from fluid. He is currently on lasix 40mg and barbara 25. Diuretics reduced due to hypotension. He is following low Na diet     H/o HCC, s/p y90 x 3. H/o lung adenocarcinoma.     Denies current black stools    HCC  12/21/18: MRI - no residual, repeat MRI in 3 months - March 2019 9/25/18: left lobe TARE - y-90  3/8/18: right lobe retreatment TARE - y-90  8/8/18: right lobeTARE - y-90    MELD-Na score: 20 at 1/17/2019 12:08 PM  MELD score: 18 at 1/17/2019 12:08 PM  Calculated from:  Serum Creatinine: 1.6 mg/dL at 1/17/2019 12:08 PM  Serum Sodium: 134 mmol/L at 1/17/2019 12:08 PM  Total Bilirubin: 1.8 mg/dL at 1/17/2019 12:08 PM  INR(ratio): 1.5 at 1/17/2019 12:08 PM  Age: 78 years    Past Medical History:   Diagnosis Date    Anemia     Cancer     Diabetes mellitus     Encounter for blood transfusion     GI bleed 8/6/2003    Hypertension     Liver carcinoma     Obstructive sleep apnea on CPAP     Sleep apnea 12/18/15    Stroke 2014    Stroke 7/16/14    TIA (transient ischemic attack) 2/11/15     Past Surgical History:   Procedure Laterality Date    APPENDECTOMY  teen    [id number]-[id number]-NW N/A 4/8/2016    Performed by [facility] at [facility] OR 2ND FLR    BIOPSY-LIVER-LAPAROSCOPIC N/A 4/25/2016    Performed by Narinder Chavez MD at [facility] OR 2ND FLR    BONE MARROW BIOPSY      CATARACT EXTRACTION  12/10/11    CHOLECYSTECTOMY      CHOLECYSTECTOMY-LAPAROSCOPIC  4/25/2016    Performed by [facility] at [facility] OR 2ND FLR    COLONOSCOPY  12/21/15    EMBOLIZATION, ARTERY, LIVER, FOR SELECTIVE INTERNAL RADIATION THERAPY USING YTTRIUM-90 MICROSPHERES N/A 9/25/2018    [facility]     EMBOLIZATION, BLOOD VESSEL N/A 8/8/2018    Performed by [facility] at [facility] OR 2ND FLR    Embolization, Yttrium Therapy N/A 7/18/2018    Performed by [facility] at [facility] OR 2ND FLR    Embolization, Yttrium Therapy N/A 3/29/2018    Performed by [facility] at [facility] OR 2ND FLR    Embolization, Yttrium Therapy N/A 3/8/2018    Performed by [facility] at [facility] OR 2ND FLR    JOINT REPLACEMENT Bilateral     knees    KNEE SURGERY Right replacement    KNEE SURGERY Left     TONSILLECTOMY       FAMILY HISTORY: Negative for liver disease    SOCIAL HISTORY:   Social History     Tobacco Use   Smoking Status Never Smoker   Smokeless Tobacco Never Used       Social History     Substance and Sexual Activity   Alcohol Use No       Social History     Substance and Sexual Activity   Drug Use No     ROS:   No fever, chills, (+)weight loss, (+) fatigue  No chest pain, dyspnea, cough  No abdominal pain,  nausea, vomiting  No headaches, visual changes  No lower extremity edema  No depression or anxiety      PHYSICAL EXAM:  Friendly White male, in no acute distress; alert and oriented to person, place and time  VITALS: reviewed  HEENT: Sclerae anicteric.   NECK: Supple  LUNGS: Normal respiratory effort.   ABDOMEN: Flat, soft, nontender. No organomegaly or masses. No ascites or hernias  SKIN: Warm and dry. No jaundice, No obvious rashes.   EXTREMITIES: Very minimal lower extremity edema  NEURO/PSYCH: Normal gate. Memory intact. Thought and speech pattern appropriate. Behavior normal. No depression or anxiety noted.    RECENT LABS:  Lab Results   Component Value Date    WBC 6.21 01/17/2019    HGB 10.2 (L) 01/17/2019    PLT 88 (L) 01/17/2019     Lab Results   Component Value Date    INR 1.5 (H) 01/17/2019     Lab Results   Component Value Date    AST 45 (H) 01/17/2019    ALT 31 01/17/2019    BILITOT 1.8 (H) 01/17/2019    ALBUMIN 3.3 (L) 01/17/2019    ALKPHOS 219 (H) 01/17/2019     CREATININE 1.6 (H) 01/17/2019    BUN 29 (H) 01/17/2019     (L) 01/17/2019    K 5.2 (H) 01/17/2019    AFP 9.0 (H) 01/17/2019         RECENT IMAGING:  MRI Abdomen W WO Contrast   Order: [id number]   Status:  Final result   Visible to patient:  Yes (Patient Portal) Next appt:  02/13/2019 at 09:00 AM in Radiology ([facility] OI-CT1 500 LB LIMIT) Dx:  Hepatocellular carcinoma   Details     Reading Physician Reading Date Result Priority   Prabhjot Curry MD 12/21/2018    Ab Tucker MD 12/21/2018       Narrative     EXAMINATION:  MRI ABDOMEN W WO CONTRAST    CLINICAL HISTORY:  HCC s/p Y90;  Liver cell carcinoma    TECHNIQUE:  Multiplanar multisequence images of the abdomen before and after administration of 10 mL Gadavist intravenous contrast.    COMPARISON:  PET CT 11/06/2018, MRI abdomen 06/29/2018    FINDINGS:  Inferior Thorax: Normal.    Liver: The right hepatic lobe is small in size with compensatory hypertrophy of the left hepatic lobe.  There is redemonstration of extensive postprocedural changes involving hepatic segments V/VI and VIII/IV.  In hepatic segment V/VI, there is reduced in size from examination dated 06/29/2018 now measuring 6.7 x 5.7 cm on axial series 13, image 65 (previously 9.2 x 5.4 cm).  This mass demonstrates irregular peripheral enhancement without appreciable washout to suggest residual/recurrent disease.    Additional mass within hepatic segment VIII/IV is slightly reduced in size measuring 5.3 x 4.6 cm on axial series 11, image 29 (previously 5.9 x 6.3 cm).  Peripheral enhancement about the treatment cavity is reduced from examination dated 06/29/2018 with a persistent small focus of nodular enhancement along the anteromedial aspect measuring approximately 1.1 cm on axial series 9, image 34 (previously 1.9 cm), which does not washout.    Subcentimeter lesion within hepatic segment II demonstrates no appreciable enhancement on today's examination.    Gallbladder: Surgically  absent.    Bile Ducts: No dilatation.    Pancreas: Involutional change without pancreatic mass lesion or peripancreatic inflammation.    Spleen: Enlarged with multiple splenic collaterals.    Adrenals: Normal.    Kidneys/Ureters: Kidneys are normal in size and location demonstrating appropriate concentration of contrast material.  Stable cyst along the left lower pole.    GI Tract/Mesentery: Visualized loops of small and large bowel are normal in caliber without evidence for obstruction or inflammation.    Peritoneal Space: There is a moderate volume of abdominal ascites increased in volume from examination dated 06/29/2018    Retroperitoneum: No significant lymphadenopathy.    Abdominal wall: There is diffuse body wall edema most prominent overlying the left flank.    Vasculature: Normal.    Bones: No acute fracture or infiltrative marrow replacement process.      Impression       Extensive post treatment change in the liver, noting areas of arterial portal shunting, delayed enhancement/scarring, and non enhancement.  Findings are similar to the 06/29/2018 exam.  No discrete evidence of residual tumor identified.    Moderate ascites, increased.    Additional stable findings as above.             ASSESSMENT  78 y.o. White male with:  1. 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 H/O ANASARCA  -- improving  -- continue current diuretic dosing     3. HCC  -- MRI due 03/2019  -- no residual on 12/208 MRI , repeat MRI due 03/2019    PLAN:  1. F/u w/ Dr. Dumont 03/2019  2. MRI due 03/2019     Thank you for allowing me to participate in the care of Hossein ALVAREZ  OLIVIER Dunn

## 2019-02-13 NOTE — Clinical Note
Established pt for Dr. Dumont, needs MRI in 03/2019 and f/u with him, can you order MRI under his name. Can see him in 04/2019 if not able to get scheduled. Likely needs IR f/u as well

## 2019-02-13 NOTE — PROGRESS NOTES
CC :  Hepatocellular carcinoma, followup        HPI:  is a 77 yo WM with  HTN, HLD, Afib (on eliquis), CVA, and lung and liver cancer.  Abdominal pain in 2016 led to CT imaging and laparoscopic liver biopsy on 5/2016 with benign pathology. Surveillance CT scan by pulmonology for lung nodule noted increase in size of lung lesion (3.0x2.8cm) and also noted cirrhotic liver with a right liver lobe lesion ([id number]). 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   Patient was evaluated and felt not to be a surgical candidate for resection of lung or liver lesion. He has established with radiation oncology with plans to undergo SBRT of his lung lesion.  He established with IR 3/1/18 and underwent mapping for Y-90 on 3/8/18.   He underwent radioembolization on 3/29/2018. He had response to treatment, but still had residual enhancement of the liver mass on MRI done in June 2018.  He then had right hepatic lobe y90 radioembolization on 8/8/18 and again on 9/25/18 ( now to the left side, segment 2).  He had CT chest w/o contrast on 10/2/18. It showed a right UL paramediastinal nodule, now 3.4 x 2.8cm, previously ( in February 2018), 2.9 x 2.5cm.      Interval History: He is here for a followup. He has completed RT to his lung. He has fatigue, feels bloated, and is not eating well.  He was hospitalized between 1/2/19 -1/12/19 for abdominal distention, anasarca. He required paracentesis. 4900ml was remove don 1/3/19.     Review of Systems   Constitutional: Negative for chills, fever, malaise/fatigue and weight loss.   HENT: Negative for congestion, ear discharge, ear pain and nosebleeds.    Eyes: Negative for blurred vision, double vision, photophobia and pain.   Respiratory: Negative for cough, hemoptysis and sputum production.    Cardiovascular: Negative for chest pain, palpitations, orthopnea and claudication.   Gastrointestinal:  Negative for abdominal pain, constipation, diarrhea, heartburn and nausea.   Genitourinary: Negative for dysuria, frequency and urgency.   Musculoskeletal: Negative for myalgias.   Skin: Negative for rash.   Neurological: Negative for dizziness, tingling, tremors, sensory change, speech change and headaches.   Endo/Heme/Allergies: Does not bruise/bleed easily.   Psychiatric/Behavioral: Negative for depression.       Current Outpatient Medications   Medication Sig    apixaban (ELIQUIS) 5 mg Tab Take 5 mg by mouth 2 (two) times daily.    cyanocobalamin (VITAMIN B-12) 1000 MCG tablet Take 100 mcg by mouth once daily.    finasteride (PROSCAR) 5 mg tablet Take 5 mg by mouth once daily.     furosemide (LASIX) 40 MG tablet Take 40 mg by mouth once daily.     iron polysaccharides (NIFEREX) 150 mg iron Cap Take 150 mg by mouth 2 (two) times daily.    metformin (GLUCOPHAGE) 500 MG tablet Take 1,000 mg by mouth 2 (two) times daily with meals.     metoprolol succinate (TOPROL-XL) 25 MG 24 hr tablet Take 1 tablet (25 mg total) by mouth once daily.    MULTIVIT-IRON-MIN-FOLIC ACID 3,500-18-0.4 UNIT-MG-MG ORAL CHEW Take by mouth.    pantoprazole (PROTONIX) 40 MG tablet Take 40 mg by mouth once daily.    pravastatin (PRAVACHOL) 10 MG tablet Take 10 mg by mouth once daily.    spironolactone (ALDACTONE) 100 MG tablet Take 1 tablet (100 mg total) by mouth once daily. (Patient taking differently: Take 25 mg by mouth once daily. )    tamsulosin (FLOMAX) 0.4 mg Cp24 Take 0.8 mg by mouth every evening.     verapamil (CALAN-SR) 180 MG CR tablet Take 1 tablet (180 mg total) by mouth every evening.    vitamin D 1000 units Tab Take 2,000 Units by mouth once daily.     No current facility-administered medications for this visit.      Facility-Administered Medications Ordered in Other Visits   Medication    omnipaque 350 iohexol 15 mL    omnipaque 350 iohexol 15 mL         There were no vitals filed for this visit.      Physical  Exam   Constitutional: He is oriented to person, place, and time. He appears well-developed.   HENT:   Head: Normocephalic and atraumatic.   Mouth/Throat: No oropharyngeal exudate.   Eyes: Pupils are equal, round, and reactive to light. No scleral icterus.   Neck: Normal range of motion.   Cardiovascular: Normal rate and regular rhythm.   No murmur heard.  Pulmonary/Chest: Effort normal and breath sounds normal. No respiratory distress. He has no wheezes. He has no rales.   Abdominal: Soft. Bowel sounds are normal. He exhibits no distension. There is no tenderness. There is no rebound.   Musculoskeletal: He exhibits edema.   Lymphadenopathy:     He has no cervical adenopathy.   Neurological: He is alert and oriented to person, place, and time. No cranial nerve deficit.   He has b/l resting tremors. No asterexis   Skin: Skin is warm.   Psychiatric: He has a normal mood and affect.       6/29/18 MRI abdomen               COMPARISON:  MRI abdomen with/without contrast 02/12/2018    FINDINGS:  Inferior Thorax: Normal.    Liver: The liver is at the upper limits of normal in size with the right hepatic lobe spanning approximately 17.5 cm.  A large, heterogeneous mass centered in hepatic segment V and VI is reduced in size now measuring approximately 9.2 x 5.4 cm consistent with post treatment change.  There is interval reduction in the degree of postcontrast enhancement with now only minimal nodular enhancement along the inferior margin and no appreciable washout on delayed phase imaging (axial series 10, image 53).  Additionally, there has been reduction in size of a mass lesion involving hepatic segment VIII now measuring approximately 5.9 x 6.3 cm.  Degree of enhancing components is reduced, however there is persistent nodular enhancement along the lateral margin of the lesion which demonstrates washout on delayed phase imaging.  Largest nodular component measures 2.0 cm, best appreciated on axial series 10, image 20.   Small focus of enhancement without delayed phase washout is again present within the anterior margin of hepatic segment II, indeterminate in appearance.  Scattered nonenhancing lesions are again present throughout the right and left hepatic lobes consistent with post treatment change.    Gallbladder: Surgically absent.    Bile Ducts: No dilatation.    Pancreas: Pancreas demonstrates involutional change.  No pancreatic mass lesion or peripancreatic inflammation.    Spleen: Enlarged measuring up to 13.5 cm in long axis.  Multiple splenic collaterals are present.    Adrenals: Normal.    Kidneys/Ureters: Kidneys are normal in size and location.  Normal post-contrast enhancement.  There is a small T2 hyperintense lesion along the lower pole of the left kidney most consistent with simple renal cyst.    GI Tract/Mesentery: No evidence of bowel obstruction or inflammation.    Peritoneal Space: There is a trace volume of abdominal ascites overlying the right hepatic lobe.    Retroperitoneum: No significant lymphadenopathy.    Abdominal wall: Normal.    Vasculature: No aneurysm.    Bones: No significant abnormality.         Impression           Interval reduction in size and degree of enhancement of 2 heterogeneous right hepatic lobe mass lesions consistent with post treatment change.  Persistent nodular enhancement along the lateral margin of the hepatic segment VIII tumor and inferior margin of the hepatic segment V/VI tumor suggests potential residual tumor as detailed above.    Stable subcentimeter focus of enhancement within hepatic segment II remains indeterminate, however is concerning for additional tumor.    Splenomegaly.    Left simple renal cyst.    Trace volume abdominal ascites        2/16/18 FINAL PATHOLOGIC DIAGNOSIS  OUTSIDE SLIDE REVIEW  1. ORIGINAL DATE OF PROCEDURE: 11/21/2017  LIVER MASS [OSR # [id number]] (NEEDLE BIOPSY):  Hepatocellular carcinoma, well differentiated with clear cell features  Background  cirrhosis (stage 4 of 4)     2. ORIGINAL DATE OF PROCEDURE 12/5/2017  LUNG MASS, RIGHT UPPER LOBE [OSR# [id number]]:  At least atypical adenomatous hyperplasia, 1.5 millimeters  See comment  COMMENT: Sections show an atypical mucinous proliferation with lepidic pattern (no definite invasion). The differentiation between atypical adenomatous hyperplasia and adenocarcinoma in situ is based on size of the  lesion; clinical correlation required. Dr. GABRIEL Lieberman has reviewed this case and concurs with the interpretation. The findings in the lung are not histologically similar to the findings in the liver.        11/5/18 PET CT          FINDINGS:  Patient was administered 2.1 millicuries of FDG intravenously.    Intracranial structures are normal.  There is a intraparotid lymph node versus a parotid lesion very small SUV max 7.58.  There is physiologic intracranial, head, and neck activity.  The lesion at the dome of the liver in the inferior right lobe of the liver are isointense to adjacent liver.  The heart mediastinum show nothing unusual.  There is physiologic spleen, pancreas, GI  adrenal and kidney activity.  Bowel loops show nothing unusual.  The bones reveal nothing.  Left lung is clear.  Right upper lobe lung abnormality SUV max 2.0.       Impression         See above    The dome liver lesion and the inferior right liver lesion demonstrate activity isointense to background liver.  This could still be very low-grade neoplasm.    The right upper lobe abnormality could be benign or malignant.    Right parotid lesion could be an inflamed parotid lymph node or a 2nd primary neoplasm.    Index right parotid lesion SUV max 7.58.  Is         11/6/18 MRI brain with cont          COMPARISON:  None.    FINDINGS:  Intracranial compartment:    Ventricles and sulci are normal in size for age without evidence of hydrocephalus. No extra-axial blood or fluid collections.    The brain parenchyma appears normal. No mass lesion,  acute hemorrhage, edema or acute infarct. No abnormal enhancement.    Normal vascular flow voids are preserved.    Skull/extracranial contents (limited evaluation): Bone marrow signal intensity is normal.       Impression         No evidence of intracranial metastatic disease.         12/21/18 MRI abdomen    COMPARISON:  PET CT 11/06/2018, MRI abdomen 06/29/2018    FINDINGS:  Inferior Thorax: Normal.    Liver: The right hepatic lobe is small in size with compensatory hypertrophy of the left hepatic lobe.  There is redemonstration of extensive postprocedural changes involving hepatic segments V/VI and VIII/IV.  In hepatic segment V/VI, there is reduced in size from examination dated 06/29/2018 now measuring 6.7 x 5.7 cm on axial series 13, image 65 (previously 9.2 x 5.4 cm).  This mass demonstrates irregular peripheral enhancement without appreciable washout to suggest residual/recurrent disease.    Additional mass within hepatic segment VIII/IV is slightly reduced in size measuring 5.3 x 4.6 cm on axial series 11, image 29 (previously 5.9 x 6.3 cm).  Peripheral enhancement about the treatment cavity is reduced from examination dated 06/29/2018 with a persistent small focus of nodular enhancement along the anteromedial aspect measuring approximately 1.1 cm on axial series 9, image 34 (previously 1.9 cm), which does not washout.    Subcentimeter lesion within hepatic segment II demonstrates no appreciable enhancement on today's examination.    Gallbladder: Surgically absent.    Bile Ducts: No dilatation.    Pancreas: Involutional change without pancreatic mass lesion or peripancreatic inflammation.    Spleen: Enlarged with multiple splenic collaterals.    Adrenals: Normal.    Kidneys/Ureters: Kidneys are normal in size and location demonstrating appropriate concentration of contrast material.  Stable cyst along the left lower pole.    GI Tract/Mesentery: Visualized loops of small and large bowel are normal in caliber  without evidence for obstruction or inflammation.    Peritoneal Space: There is a moderate volume of abdominal ascites increased in volume from examination dated 06/29/2018    Retroperitoneum: No significant lymphadenopathy.    Abdominal wall: There is diffuse body wall edema most prominent overlying the left flank.    Vasculature: Normal.    Bones: No acute fracture or infiltrative marrow replacement process.      Impression       Extensive post treatment change in the liver, noting areas of arterial portal shunting, delayed enhancement/scarring, and non enhancement.  Findings are similar to the 06/29/2018 exam.  No discrete evidence of residual tumor identified.    Moderate ascites, increased.    Additional stable findings as above          Assessment:     1. Hepatocellular carcinoma  2. 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 Obesity  4. Atrial fibrillation  5. Hypertension   6. Diabetes mellitus type 2  7. Pedal edema  8. constipation     Plan:     1. He has been treated with Y90 radio embolization three times so far, most recently in September 2018. He feels well. His liver function is within normal mlimits in October 2018. No pain.  PET CT on 11/5/18 showed that the lesion at the dome of the liver in the inferior right lobe of the liver were isointense to adjacent liver.MRI abdomen in Dec 2018 showed extensive post treatment changes in the liver, noting areas of arterial portal shunting, delayed enhancement/scarring, and non enhancement.  Findings were similar to the 06/29/2018 exam.  No discrete evidence of residual tumor identified. There was moderate ascites, increased from before. He will have repeat MRI in March 2019.       2.  CT done on 10/2/18 showed increase in the size of the previously known lesion. The right UL paramediastinal nodule, now 3.4 x 2.8cm, previously ( in February 2018), 2.9 x 2.5cm. MRI brain on 11/6/18 was negative for intra cranial metastases. PET CT( although the lung lesion was not  PET avid previously) on 11/5/18 showed an intraparotid lymph node versus a parotid lesion very small SUV max 7.58.  There was physiologic intracranial, head, and neck activity.  There was physiologic spleen, pancreas, GI  adrenal and kidney activity.  Bowel loops showed nothing unusual.  Left lung was clear.  Right upper lobe lung abnormality was seen, SUV max 2.0.He has been evaluated by radiation oncology here and has been receiving RT to lung. He received 50Gy in 5 fractions between 11/26 -12/10/18.Repeat CT done today. I reviewed the images personally and discussed   them with the patient. The right UL lung lesion apepears to be slightly smaller thsan before. Official report is awaited.  He will follow here in 3 months.       3. Exercise and calorie reduction suggested  4. On Eliquis. Rate controlled  5. BP well controlled on Toprol XL  6. Blood glucose well controlled on Metformin. Not on Insulin  7. He is on diuretic, but has been off of it for the past 3 days.   8. Right parotid  abnormality : Noted on PET CT done in Nov 2018. He is asymptomatic. No palpable mass.   9. Discussed using Lactulsoe as needed

## 2019-03-08 NOTE — PROGRESS NOTES
Mr. Sanchez is a 78 years old man treated with radiation for a stage cT2a N0 M0 (IB) adenocarcinoma of the right upper lobe of the lung. Treatment ended 12/10/18. He has been treated with radionuclide embolization for a hepatocellular carcinoma.     He returns for PET scan on 3/13/19. Will see him following the scan.

## 2019-04-09 PROBLEM — N17.9 ACUTE KIDNEY INJURY: Status: RESOLVED | Noted: 2019-01-01 | Resolved: 2019-01-01

## 2019-04-09 NOTE — TELEPHONE ENCOUNTER
Labs reviewed by Dr. Dumont - kidney function and electrolytes stable. Will try slowly increasing diuretics to help with fluid retention. Increase lasix to 40 mg daily and spironolactone to 25 mg daily. Repeat labs in 1 week locally (external orders mailed). Encouraged to continue monitoring/recording daily BP. Notify provider if systolic is <100 so that we can adjust medications if needed.

## 2019-04-09 NOTE — PROGRESS NOTES
[facility] Established Patient Visit    Reason for visit: Abdominal pain     Last clinic visit: 2/13/19    PCP: Aleksandr Velazquez    HPI:  This is a 79 y.o. male here for abdominal pain. He is a patient of Dr. Art, also seen by [facility], and is a new patient to me. He has decompensated cirrhosis, likely due to DRISCOLL. Liver disease also complicated by HCC.     Here today with worsening R sided abdominal pain over the last weeks. He was hospitalized ~1 week ago in [place] with a bladder infection. Had a paracentesis 4/4 with 3.9L removed. Prior to this, last paracentesis was 1/3 with 4.9L removed. Thinks he is retaining fluid again. Weight is up ~5lb today.      Of note, was hospitalized 1/2019 with volume overload and YEMI. Reports BP intolerant to higher doses of diuretics. Current diuretics -- lasix 20 mg and spironolactone 12.5 mg. Creatinine 1.5-1.6.     HCC history:  3/8/18: right lobe retreatment TARE - y-90  8/8/18: right lobeTARE - y-90  9/25/18: left lobe TARE - y-90  12/21/18: MRI with no residual or recurrent HCC.   *Reviewed by IR, Dr. Lacy -- recommendation for surveillance in 3 months (3/2019) though has not had repeat MRI.     *Noted history of lung adenocarcinoma, followed by Oncology  PET scan done 3/13/19 -- hypermetabolic hepatic dome lesion concerning for residual      Decompensated liver disease:  - Jaundice - none  - Ascites / lower extremity edema - yes  - Hepatic encephalopathy - none  - GI bleeding - none    MELD-Na score: 24 at 2/13/2019 10:59 AM  MELD score: 22 at 2/13/2019 10:59 AM  Calculated from:  Serum Creatinine: 1.5 mg/dL at 2/13/2019 10:59 AM  Serum Sodium: 133 mmol/L at 2/13/2019 10:59 AM  Total Bilirubin: 2.2 mg/dL at 2/13/2019 10:59 AM  INR(ratio): 2.1 at 2/13/2019 10:59 AM  Age: 78 years      ROS:   GENERAL: Denies fever, chills, fatigue. (+) weight gain  HEENT: Denies headaches, dizziness, vision/hearing changes  CARDIOVASCULAR: Denies chest  pain, palpitations. (+) lower extremity edema  RESPIRATORY: (+) cough and ARTEAGA  GI: (+) R sided abdominal pain. Denies rectal bleeding, nausea, vomiting. No change in bowel pattern or color  : Denies dysuria, hematuria   SKIN: Denies rash, itching   NEURO: Denies confusion, memory loss, or mood changes  PSYCH: Denies depression or anxiety  HEME/LYMPH: Denies easy bruising or bleeding  MSK: Denies joint pain or myalgia.       PMHX:  has a past medical history of Anemia, Cancer, Diabetes mellitus, Encounter for blood transfusion, GI bleed (8/6/2003), Hypertension, Liver carcinoma, Obstructive sleep apnea on CPAP, Sleep apnea (12/18/15), Stroke (2014), Stroke (7/16/14), and TIA (transient ischemic attack) (2/11/15).    PSHX:  has a past surgical history that includes Appendectomy (teen); Colonoscopy (12/21/15); Cataract extraction (12/10/11); Bone marrow biopsy; Knee surgery (Right, replacement); Knee surgery (Left); Joint replacement (Bilateral); Cholecystectomy; Tonsillectomy; and Embolization (N/A, 8/8/2018).    The patient's social and family histories were reviewed by me and updated in the appropriate section of the electronic medical record.    Review of patient's allergies indicates:   Allergen Reactions    Altace [ramipril] Anaphylaxis    Adhesive tape-silicones Blisters       Current Outpatient Medications on File Prior to Visit   Medication Sig Dispense Refill    apixaban (ELIQUIS) 5 mg Tab Take 5 mg by mouth 2 (two) times daily.      cyanocobalamin (VITAMIN B-12) 1000 MCG tablet Take 100 mcg by mouth once daily.      finasteride (PROSCAR) 5 mg tablet Take 5 mg by mouth once daily.       furosemide (LASIX) 40 MG tablet Take 40 mg by mouth once daily.       iron polysaccharides (NIFEREX) 150 mg iron Cap Take 150 mg by mouth 2 (two) times daily.      metoprolol succinate (TOPROL-XL) 25 MG 24 hr tablet Take 1 tablet (25 mg total) by mouth once daily.      MULTIVIT-IRON-MIN-FOLIC ACID 3,500-18-0.4  "UNIT-MG-MG ORAL CHEW Take by mouth.      pantoprazole (PROTONIX) 40 MG tablet Take 40 mg by mouth once daily.      pravastatin (PRAVACHOL) 10 MG tablet Take 10 mg by mouth once daily.      spironolactone (ALDACTONE) 100 MG tablet Take 1 tablet (100 mg total) by mouth once daily. (Patient taking differently: Take 25 mg by mouth once daily. ) 30 tablet 2    tamsulosin (FLOMAX) 0.4 mg Cp24 Take 0.8 mg by mouth every evening.       vitamin D 1000 units Tab Take 2,000 Units by mouth once daily.      verapamil (CALAN-SR) 180 MG CR tablet Take 1 tablet (180 mg total) by mouth every evening. 30 tablet 2     No current facility-administered medications on file prior to visit.        Objective Findings:    PHYSICAL EXAM:   Friendly White male, in no acute distress; alert and oriented to person, place and time  VITALS: /78 (BP Location: Right arm, Patient Position: Sitting, BP Method: Large (Automatic))   Pulse 108   Temp 97.6 °F (36.4 °C) (Oral)   Resp 20   Ht 5' 4" (1.626 m)   Wt 101.6 kg (223 lb 15.8 oz)   SpO2 97%   BMI 38.45 kg/m²   HENT: Normocephalic, without obvious abnormality. Oral mucosa pink and moist.   EYES: Sclerae anicteric.   NECK: Supple. No masses or cervical adenopathy.  CARDIOVASCULAR: Regular rate and rhythm. No murmurs.  RESPIRATORY: Normal respiratory effort. BBS CTA. No wheezes or crackles.  GI: Non-tender. Distended, more on R side, non-tense. Shifting dullness. No palpable hepatosplenomegaly. No masses palpable.  EXTREMITIES:  No clubbing, cyanosis. +1-2 edema to BLE.   SKIN: Warm and dry. No jaundice. No rashes noted to exposed skin. No telangectasias noted. No palmar erythema.  NEURO:  Normal gait. No asterixis.  PSYCH:  Memory intact. Thought and speech pattern appropriate. Behavior normal. No depression or anxiety noted.       Labs:  Lab Results   Component Value Date    WBC 9.26 02/13/2019    HGB 11.9 (L) 02/13/2019    HCT 37.2 (L) 02/13/2019     (L) 02/13/2019    NA " 133 (L) 02/13/2019    K 4.9 02/13/2019    CREATININE 1.5 (H) 02/13/2019    ALT 42 02/13/2019    AST 47 (H) 02/13/2019    ALKPHOS 315 (H) 02/13/2019    BILITOT 2.2 (H) 02/13/2019    BILIDIR 0.5 (H) 09/25/2018    ALBUMIN 2.9 (L) 02/13/2019    INR 2.1 (H) 02/13/2019    AFP 11 (H) 02/13/2019    HGBA1C 5.2 01/03/2019       No results found for: HEPAIGG    Hepatitis B Surface Ag   Date Value Ref Range Status   05/22/2018 Negative  Final     No results found for: HEPBCAB  Hep B S Ab   Date Value Ref Range Status   05/22/2018 Negative  Final     Hepatitis C Ab   Date Value Ref Range Status   05/22/2018 Negative  Final       There is no immunization history on file for this patient.      Diagnostic Studies  MRI abd w/wo contrast - 12/21/18    FINDINGS:  Inferior Thorax: Normal.    Liver: The right hepatic lobe is small in size with compensatory hypertrophy of the left hepatic lobe.  There is redemonstration of extensive postprocedural changes involving hepatic segments V/VI and VIII/IV.  In hepatic segment V/VI, there is reduced in size from examination dated 06/29/2018 now measuring 6.7 x 5.7 cm on axial series 13, image 65 (previously 9.2 x 5.4 cm).  This mass demonstrates irregular peripheral enhancement without appreciable washout to suggest residual/recurrent disease.    Additional mass within hepatic segment VIII/IV is slightly reduced in size measuring 5.3 x 4.6 cm on axial series 11, image 29 (previously 5.9 x 6.3 cm).  Peripheral enhancement about the treatment cavity is reduced from examination dated 06/29/2018 with a persistent small focus of nodular enhancement along the anteromedial aspect measuring approximately 1.1 cm on axial series 9, image 34 (previously 1.9 cm), which does not washout.    Subcentimeter lesion within hepatic segment II demonstrates no appreciable enhancement on today's examination.    Gallbladder: Surgically absent.    Bile Ducts: No dilatation.    Pancreas: Involutional change without  pancreatic mass lesion or peripancreatic inflammation.    Spleen: Enlarged with multiple splenic collaterals.    Adrenals: Normal.    Kidneys/Ureters: Kidneys are normal in size and location demonstrating appropriate concentration of contrast material.  Stable cyst along the left lower pole.    GI Tract/Mesentery: Visualized loops of small and large bowel are normal in caliber without evidence for obstruction or inflammation.    Peritoneal Space: There is a moderate volume of abdominal ascites increased in volume from examination dated 06/29/2018    Retroperitoneum: No significant lymphadenopathy.    Abdominal wall: There is diffuse body wall edema most prominent overlying the left flank.    Vasculature: Normal.    Bones: No acute fracture or infiltrative marrow replacement process.      Impression       Extensive post treatment change in the liver, noting areas of arterial portal shunting, delayed enhancement/scarring, and non enhancement.  Findings are similar to the 06/29/2018 exam.  No discrete evidence of residual tumor identified.    Moderate ascites, increased.    Additional stable findings as above.        ASSESSMENT / PLAN:  79 y.o. White male with:    1. Decompensated cirrhosis, likely due to DRISCOLL   -- MELD-Na score: 24 at 2/13/2019 10:59 AM  MELD score: 22 at 2/13/2019 10:59 AM  Calculated from:  Serum Creatinine: 1.5 mg/dL at 2/13/2019 10:59 AM  Serum Sodium: 133 mmol/L at 2/13/2019 10:59 AM  Total Bilirubin: 2.2 mg/dL at 2/13/2019 10:59 AM  INR(ratio): 2.1 at 2/13/2019 10:59 AM  Age: 78 years  -- Repeat labs today    2. Abdominal pain  -- Suspect due to ascites, will arrange repeat paracentesis with cell count (scheduled tomorrow) though unsure if there will be enough fluid to drain    3. Ascites and lower extremity edema  -- Still having fluid retention though kidney function and BP previously intolerant to higher diuretic doses  -- Repeat labs today, if stable, may consider increasing diuretics   --  Can consider reaching out to PCP to adjust antihypertensives if having hypotension (or adding midodrine if meds cannot be d/c'd)      4. HCC  -- Had PET scan 3/2019 but still needs repeat MRI for surveillance, will schedule     5. History of lung adenocarcinoma        Orders Placed This Encounter   Procedures    IR Paracentesis with Imaging    WBC & Diff,Body Fluid Ascites           EDUCATION / DISCUSSION:   The disease process and manifestations of cirrhosis were discussed. Signs and symptoms of hepatic decompensation were reviewed, including jaundice, ascites, and slowed mentation due to hepatic encephalopathy. The patient should seek medical attention if any of these things occur.  We discussed the potential for bleeding from esophageal varices with symptoms of hematemesis and melena. The patient should report to the Emergency Department for these symptoms.    We discussed the increased risk of hepatocellular carcinoma (HCC) due to cirrhosis. Continued HCC screening every six months with ultrasound and AFP tumor marker is recommended.     Cirrhosis Counseling  -- Strict abstinence from alcohol (includes beer, wine, and/or liquor)  -- Avoid non-steroidal anti-inflammatory drugs (NSAIDs) such as ibuprofen (Motrin, Advil), naproxen (Naprosyn, Aleve)  -- Tylenol/acetaminophen as needed for pain, no more than 2000 mg per day  -- No raw seafood due to the risk of infection  -- Low sodium diet, less than 2000 mg per day   -- High protein diet to prevent muscle mass loss. Can drink protein shakes (Premier Protein is best option because it is very high protein and low sugar) - Ok to use this as nighttime snack to fit it in.   -- Resistance exercises for muscle strength  -- Upper endoscopy every 1-3 years to screen for varices (enlarged blood vessels) in the stomach and esophagus which can burst and cause bleeding  -- Ultrasound of the liver every 6 months for liver cancer screening         Return to clinic as scheduled  with Dr. Dumont (4/29)         Thank you for allowing me to participate in the care of Hossein Ashraf, TALATP-C  Hepatology and Liver Transplant

## 2019-04-10 NOTE — DISCHARGE INSTRUCTIONS
"For scheduling: Call Theresa at [phone]    For questions or concerns call: ASHLEY MON-FRI 8 AM- 5PM: [phone].   **After hours and weekends: Call [phone] and ask for "Radiology Resident on call".    For immediate concerns that are not emergent, you may call our radiology clinic at: [phone]    "

## 2019-04-10 NOTE — PROGRESS NOTES
Patient stable, tolerated paracentesis well,  3.1 L removed, labs sent, 100 mL albumin given per protocol.  Discharge instruction, and follow up care reviewed.  Patient verbalized understanding, and denies further questions.  Provided with ROCU and after hours number.  Patient transported via wheelchair.

## 2019-04-10 NOTE — PROCEDURES
Radiology Post-Procedure Note    Pre Op Diagnosis: Ascites  Post Op Diagnosis: Same    Procedure: Paracentesis    Procedure performed by: Jean-Claude Varela MD    Written Informed Consent Obtained: Yes  Specimen Removed: YES 3.1 L serous fluid  Estimated Blood Loss: Minimal    Findings:   Successful paracentesis.  Albumin administered PRN per protocol.    Patient tolerated procedure well.    Jean-Claude Varela MD  Department of Radiology  Pager: [phone]

## 2019-04-10 NOTE — H&P
Radiology History & Physical      SUBJECTIVE:     Chief Complaint: ascites    History of Present Illness:  Hossein Sanchez Sr. is a 79 y.o. male who presents for paracentesis  Past Medical History:   Diagnosis Date    Anemia     Cancer     Diabetes mellitus     Encounter for blood transfusion     GI bleed 8/6/2003    Hypertension     Liver carcinoma     Obstructive sleep apnea on CPAP     Sleep apnea 12/18/15    Stroke 2014    Stroke 7/16/14    TIA (transient ischemic attack) 2/11/15     Past Surgical History:   Procedure Laterality Date    APPENDECTOMY  teen    HHAHBM-EZOMMP-QT N/A 4/8/2016    [facility]    BIOPSY-LIVER-LAPAROSCOPIC N/A 4/25/2016    Performed by Narinder Chavez MD at [facility] OR [address] [place] [address]    BONE MARROW BIOPSY      CATARACT EXTRACTION  12/10/11    CHOLECYSTECTOMY      CHOLECYSTECTOMY-LAPAROSCOPIC  4/25/2016    Performed by Narinder Chavez MD at [facility] OR [address] [place] [address]    COLONOSCOPY  12/21/15    EMBOLIZATION, ARTERY, LIVER, FOR SELECTIVE INTERNAL RADIATION THERAPY USING YTTRIUM-90 MICROSPHERES N/A 9/25/2018    Performed by [facility]    EMBOLIZATION, BLOOD VESSEL N/A 8/8/2018    Per[facility] by [facility]    Embolization, Yttrium Therapy N/A 7/18/2018    Performed by [facility]    Embolization, Yttrium Therapy N/A 3/29/2018    Per[facility]    Embolization, Yttrium Therapy N/A 3/8/2018    Per[facility] by [facility]    JOINT REPLACEMENT Bilateral     knees    KNEE SURGERY Right replacement    KNEE SURGERY Left     TONSILLECTOMY         Home Meds:   Prior to Admission medications    Medication Sig Start Date End Date Taking? Authorizing Provider   apixaban (ELIQUIS) 5 mg Tab Take 5 mg by mouth 2 (two) times daily.    Historical Provider, MD   cyanocobalamin (VITAMIN  B-12) 1000 MCG tablet Take 100 mcg by mouth once daily.    Historical Provider, MD   finasteride (PROSCAR) 5 mg tablet Take 5 mg by mouth once daily.  5/13/16   Historical Provider, MD   furosemide (LASIX) 40 MG tablet Take 40 mg by mouth once daily.  11/12/18   Historical Provider, MD   iron polysaccharides (NIFEREX) 150 mg iron Cap Take 150 mg by mouth 2 (two) times daily.    Historical Provider, MD   metoprolol succinate (TOPROL-XL) 25 MG 24 hr tablet Take 1 tablet (25 mg total) by mouth once daily. 1/11/19   Indigo Rice MD   MULTIVIT-IRON-MIN-FOLIC ACID 3,500-18-0.4 UNIT-MG-MG ORAL CHEW Take by mouth.    Historical Provider, MD   pantoprazole (PROTONIX) 40 MG tablet Take 40 mg by mouth once daily.    Historical Provider, MD   pravastatin (PRAVACHOL) 10 MG tablet Take 10 mg by mouth once daily.    Historical Provider, MD   spironolactone (ALDACTONE) 25 MG tablet Take 25 mg by mouth once daily. 3/20/19   Historical Provider, MD   tamsulosin (FLOMAX) 0.4 mg Cp24 Take 0.8 mg by mouth every evening.  5/13/16   Historical Provider, MD   verapamil (CALAN-SR) 180 MG CR tablet Take 1 tablet (180 mg total) by mouth every evening. 1/11/19 1/11/20  Indigo Rice MD   vitamin D 1000 units Tab Take 2,000 Units by mouth once daily.    Historical Provider, MD     Anticoagulants/Antiplatelets: eliquis has been held    Allergies:   Review of patient's allergies indicates:   Allergen Reactions    Altace [ramipril] Anaphylaxis    Adhesive tape-silicones Blisters     Sedation History:  no adverse reactions            OBJECTIVE:     Vital Signs (Most Recent)  Pulse: (!) 116 (04/10/19 1310)  Resp: 20 (04/10/19 1310)  BP: (!) 152/98 (04/10/19 1310)  SpO2: 96 % (04/10/19 1310)    Physical Exam:    General: no acute distress  Mental Status: alert and oriented to person, place and time  HEENT: normocephalic, atraumatic  Chest: unlabored breathing  Heart: regular heart rate  Abdomen: distended  Extremity: moves all  extremities    Laboratory  Lab Results   Component Value Date    INR 1.6 (H) 04/09/2019       Lab Results   Component Value Date    WBC 10.72 04/09/2019    HGB 12.2 (L) 04/09/2019    HCT 35.9 (L) 04/09/2019    MCV 94 04/09/2019     (L) 04/09/2019      Lab Results   Component Value Date     (H) 04/09/2019     04/09/2019    K 4.5 04/09/2019     04/09/2019    CO2 24 04/09/2019    BUN 24 (H) 04/09/2019    CREATININE 1.3 04/09/2019    CALCIUM 8.9 04/09/2019    MG 1.5 (L) 01/11/2019    ALT 19 04/09/2019    AST 35 04/09/2019    ALBUMIN 2.6 (L) 04/09/2019    BILITOT 1.8 (H) 04/09/2019    BILIDIR 0.5 (H) 09/25/2018       ASSESSMENT/PLAN:     Sedation Plan: Local  Patient will undergo US guided paracentesis. Informed consent obtained..    Jean-Claude Varela MD  Department of Radiology  Pager: [phone]

## 2019-04-12 NOTE — TELEPHONE ENCOUNTER
Patient: Hossein Sanchez Sr.       MRN: [id number]      : 1940     Age: 79 y.o.  [address]  [facility] [address]    Provider: Hepatologist - Julia Lacy    Urgency of review: non-urgent    Patient Transplant Status: Not a candidate    Reason for presentation: Reassessment    Clinical Summary:   79M, DRISCOLL, ascites, HCC, hx of lung adenocarcinoma.    HCC:  3/8/18: right lobe retreatment TAR18: right lobeTARE - 18: left lobe TARE - 18: MRI with no residual or recurrent HCC.   *Reviewed by Dr. Regino ROQUE -- recommendation for surveillance in 3 months (3/2019) though has not had repeat MRI.      MRI - 19:?residual or recurrence.    MELD-Na score: 17 at 2019 10:10 AM  MELD score: 16 at 2019 10:10 AM  Calculated from:  Serum Creatinine: 1.3 mg/dL at 2019 10:10 AM  Serum Sodium: 136 mmol/L at 2019 10:10 AM  Total Bilirubin: 1.8 mg/dL at 2019 10:10 AM  INR(ratio): 1.6 at 2019 10:10 AM  Age: 79 years    AFP: 6.2     Imaging to be reviewed: MRI    HCC Treatment History: y-90 x 3.    Platelets:   Lab Results   Component Value Date/Time     (L) 2019 10:10 AM     Creatinine:   Lab Results   Component Value Date/Time    CREATININE 1.3 2019 10:10 AM     Bilirubin:   Lab Results   Component Value Date/Time    BILITOT 1.8 (H) 2019 10:10 AM     AFP Last 3 each if available:   Lab Results   Component Value Date/Time    AFP 6.2 2019 10:10 AM    AFP 11 (H) 2019 10:59 AM    AFP 9.0 (H) 2019 12:08 PM       MELD: MELD-Na score: 17 at 2019 10:10 AM  MELD score: 16 at 2019 10:10 AM  Calculated from:  Serum Creatinine: 1.3 mg/dL at 2019 10:10 AM  Serum Sodium: 136 mmol/L at 2019 10:10 AM  Total Bilirubin: 1.8 mg/dL at 2019 10:10 AM  INR(ratio): 1.6 at 2019 10:10 AM  Age: 79 years    Plan:     Follow-up Provider:

## 2019-04-17 NOTE — PROGRESS NOTES
Paracentesis complete. 3700 mLs peritoneal fluid drained. Pt tolerated well. Dressing to RLQ clean, dry, and intact. Albumin 25% unable to be given. Specimens sent per lab order. Patient verbalized understanding of discharge instructions and was escorted out to lobby in wheelchair.

## 2019-04-17 NOTE — H&P
Radiology History & Physical      SUBJECTIVE:     Chief Complaint: Ascites    History of Present Illness:  Hossein Sanchez Sr. is a 79 y.o. male with ascites who presents for paracentesis.  Past Medical History:   Diagnosis Date    Anemia     Cancer     Diabetes mellitus     Encounter for blood transfusion     GI bleed 8/6/2003    Hypertension     Liver carcinoma     Obstructive sleep apnea on CPAP     Sleep apnea 12/18/15    Stroke 2014    Stroke 7/16/14    TIA (transient ischemic attack) 2/11/15     Past Surgical History:   Procedure Laterality Date    APPENDECTOMY  teen    TEPGWK-GSQPZD-BQ N/A 4/8/2016    Performed by [facility] Diagnostic Provider at [facility] OR [address] [place] [address]    BIOPSY-LIVER-LAPAROSCOPIC N/A 4/25/2016    Performed by Narinder Chavez MD at [facility] OR [address] [place] [address]    BONE MARROW BIOPSY      CATARACT EXTRACTION  12/10/11    CHOLECYSTECTOMY      CHOLECYSTECTOMY-LAPAROSCOPIC  4/25/2016    Performed by Narinder Chavez MD at [facility] OR [address] [place] [address]    COLONOSCOPY  12/21/15    EMBOLIZATION, ARTERY, LIVER, FOR SELECTIVE INTERNAL RADIATION THERAPY USING YTTRIUM-90 MICROSPHERES N/A 9/25/2018    [facility]    EMBOLIZATION, BLOOD VESSEL N/A 8/8/2018    Performed by [facility]    Embolization, Yttrium Therapy N/A 7/18/2018    Performed by [facility]    Embolization, Yttrium Therapy N/A 3/29/2018    Performed by [facility]    Embolization, Yttrium Therapy N/A 3/8/2018    Performed by [facility]    JOINT REPLACEMENT Bilateral     knees    KNEE SURGERY Right replacement    KNEE SURGERY Left     TONSILLECTOMY         Home Meds:   Prior to Admission medications    Medication Sig Start Date End Date Taking? Authorizing Provider   apixaban (ELIQUIS) 5 mg Tab Take 5 mg by mouth 2 (two) times daily.    Historical Provider, MD   cyanocobalamin  (VITAMIN B-12) 1000 MCG tablet Take 100 mcg by mouth once daily.    Historical Provider, MD   finasteride (PROSCAR) 5 mg tablet Take 5 mg by mouth once daily.  5/13/16   Historical Provider, MD   furosemide (LASIX) 40 MG tablet Take 40 mg by mouth once daily.  11/12/18   Historical Provider, MD   iron polysaccharides (NIFEREX) 150 mg iron Cap Take 150 mg by mouth 2 (two) times daily.    Historical Provider, MD   metoprolol succinate (TOPROL-XL) 25 MG 24 hr tablet Take 1 tablet (25 mg total) by mouth once daily. 1/11/19   Indigo Rice MD   MULTIVIT-IRON-MIN-FOLIC ACID 3,500-18-0.4 UNIT-MG-MG ORAL CHEW Take by mouth.    Historical Provider, MD   pantoprazole (PROTONIX) 40 MG tablet Take 40 mg by mouth once daily.    Historical Provider, MD   pravastatin (PRAVACHOL) 10 MG tablet Take 10 mg by mouth once daily.    Historical Provider, MD   spironolactone (ALDACTONE) 25 MG tablet Take 25 mg by mouth once daily. 3/20/19   Historical Provider, MD   tamsulosin (FLOMAX) 0.4 mg Cp24 Take 0.8 mg by mouth every evening.  5/13/16   Historical Provider, MD   verapamil (CALAN-SR) 180 MG CR tablet Take 1 tablet (180 mg total) by mouth every evening. 1/11/19 1/11/20  Indigo Rice MD   vitamin D 1000 units Tab Take 2,000 Units by mouth once daily.    Historical Provider, MD     Anticoagulants/Antiplatelets: Eliquis    Allergies:   Review of patient's allergies indicates:   Allergen Reactions    Altace [ramipril] Anaphylaxis    Adhesive tape-silicones Blisters     Sedation History:  no adverse reactions    Review of Systems:   Hematological: no known coagulopathies  Respiratory: no shortness of breath  Cardiovascular: no chest pain  Gastrointestinal: no abdominal pain  Genito-Urinary: no dysuria  Musculoskeletal: negative  Neurological: no TIA or stroke symptoms         OBJECTIVE:     Vital Signs (Most Recent)  Pulse: (!) 113 (04/17/19 1325)  BP: 135/89 (04/17/19 1325)  SpO2: 100 % (04/17/19 1325)    Physical Exam:  ASA:  n/a  Mallampati: n/a    General: no acute distress  Mental Status: alert and oriented to person, place and time  HEENT: normocephalic, atraumatic  Chest: unlabored breathing  Heart: regular heart rate  Abdomen: nondistended  Extremity: moves all extremities    Laboratory  Lab Results   Component Value Date    INR 1.6 (H) 04/09/2019       Lab Results   Component Value Date    WBC 10.72 04/09/2019    HGB 12.2 (L) 04/09/2019    HCT 35.9 (L) 04/09/2019    MCV 94 04/09/2019     (L) 04/09/2019      Lab Results   Component Value Date     (H) 04/17/2019     (L) 04/17/2019    K 4.6 04/17/2019    CL 99 04/17/2019    CO2 23 04/17/2019    BUN 28 (H) 04/17/2019    CREATININE 1.7 (H) 04/17/2019    CALCIUM 9.1 04/17/2019    MG 1.5 (L) 01/11/2019    ALT 17 04/17/2019    AST 24 04/17/2019    ALBUMIN 2.7 (L) 04/17/2019    BILITOT 2.1 (H) 04/17/2019    BILIDIR 0.5 (H) 09/25/2018       ASSESSMENT/PLAN:     Sedation Plan: Local only.  Patient will undergo paracentesis.    Ab Elmore MD  Diagnostic and Interventional Radiologist  Department of Radiology  Pager: [phone]

## 2019-04-17 NOTE — PROCEDURES
Radiology Post-Procedure Note    Pre Op Diagnosis: Ascites  Post Op Diagnosis: Same    Procedure: Paracentesis    Procedure performed by: Ab Elmore MD    Written Informed Consent Obtained: Yes  Specimen Removed: YES   Estimated Blood Loss: Minimal    Findings:   Successful paracentesis.  Albumin administered PRN per protocol.    Patient tolerated procedure well.    Ab Elmore MD  Diagnostic and Interventional Radiologist  Department of Radiology  Pager: [phone]

## 2019-04-18 NOTE — TELEPHONE ENCOUNTER
----- Message from Earnestine Ashraf NP sent at 4/18/2019  8:31 AM CDT -----  Patient notified of results via [facility].   Please schedule repeat CMP in 1 week (can be done when patient returning for para on 4/24). Thanks.

## 2019-04-18 NOTE — TELEPHONE ENCOUNTER
Notified patient RE: lab appointment for labs an Para of time, spoke with patient wife Sona agreed with schedule mailed reminder to home address

## 2019-04-29 PROBLEM — K76.7 HEPATORENAL SYNDROME: Status: ACTIVE | Noted: 2019-01-01

## 2019-04-29 PROBLEM — K63.1 BOWEL PERFORATION: Status: ACTIVE | Noted: 2019-01-01

## 2019-04-29 NOTE — PROGRESS NOTES
Please call extension [phone] (if nobody answers, this will flip to a beeper, so put in your call back number) upon patient arrival to [facility] for Hospital Medicine admit team assignment and for additional admit orders for the patient.  Do not page the attending, staff physician associate with the patient on arrival (may not be in-house at the time of arrival).  Rather, always call [phone] to reach the triage physician for orders and team assignment.        Outside Transfer Acceptance Note     Patients name: Hossein Sanchez     Transferring Physician or Mid-Level provider/Clinic giving report: Aleksandr Velazquez MD ([facility])      Accepting Physician for admission to hospital: Kenzie Lewis MD     Date of acceptance:  04/29/2019    Allergies: Altace     Reason for transfer: Suspected bowel perforation and need for surgical intervention     HPI:  This is a 79-year-old male with DRISCOLL-induced cirrhosis, hepatocellular carcinoma, chronic atrial fibrillation, chronic diastolic heart failure, and type 2 diabetes mellitus who was admitted to [facility] on 04/21/2019 secondary to increased abdominal pain and decreased appetite.  The patient has a history of refractory ascites with multiple paracentesis, treatments for SBP, but no positive peritoneal cultures.  The patient initially had a BUN and creatinine of 35 and 2.0 and was treated conservatively despite a possible small amount of free air on CT scan of the abdomen.  The patient has been given IV fluids, IV antibiotics with Flagyl and Zosyn, and bowel rest.  The patient has not improved since admit with severe abdominal pain, and repeat CT scan of the abdomen shows more free air present.  The patient has remained afebrile and hemodynamically stable during the course of this hospitalization.  Renal function however has worsened with creatinine of 4.0 today.  Urine output has decreased to about 300 cc over the past 24 hr.  Case was discussed at length with general  surgery who will see the patient in consultation.    VS: /69  P 106   R 18  T 98.7F  O2 Sats 95% on RA (11:30)    Labs: As above and see scanned reports    Radiographs: See scanned documents and discs    To Do List upon arrival:  1.  Consult General Surgery (Dr. Chavez)  2.  NPO   3.  Consult Nephrology

## 2019-04-30 PROBLEM — Z51.5 COMFORT MEASURES ONLY STATUS: Status: ACTIVE | Noted: 2019-01-01

## 2019-04-30 PROBLEM — Z71.89 GOALS OF CARE, COUNSELING/DISCUSSION: Status: ACTIVE | Noted: 2019-01-01

## 2019-04-30 NOTE — HPI
This is a 79-year-old male with DRISCOLL-induced cirrhosis, hepatocellular carcinoma (treated with Y90 radioembolization) and lung adenocarcinoma ([id number])  s/p radiation, chronic atrial fibrillation, chronic diastolic heart failure, and type 2 diabetes mellitus who was admitted to [facility] on 04/21/2019 secondary to increased abdominal pain and decreased appetite.  The patient has a history of refractory ascites with multiple paracentesis, treatments for SBP, but no positive peritoneal cultures.  The patient initially had a BUN and creatinine of 35 and 2.0 and was treated conservatively despite a possible small amount of free air on CT scan of the abdomen.  The patient has been given IV fluids, IV antibiotics with Flagyl and Zosyn, and bowel rest.  The patient has not improved since admit with severe abdominal pain, and repeat CT scan of the abdomen shows more free air present.  The patient has remained afebrile and hemodynamically stable during the course of this hospitalization.  Renal function however has worsened with creatinine of 4.0 today.  Urine output has decreased to about 300 cc over the past 24 hr. Case was discussed at length with general surgery who will see the patient in consultation.    Upon arrival patient was altered and confused, wife at the bedside, surgery consulted and came evaluated the patient for possible surgery for     bowel perforation, shortly after rapid respoonsse called due to worsening mental status and de-sating to 80s, Critical Care Medicine has been     consulted for assistance with management. Severity of condition and goals of care initiated with family. Decision made to make pt DNR/DNI and   transition to complete comfort care.

## 2019-04-30 NOTE — DISCHARGE SUMMARY
[facility] Hospital Medicine  Discharge Summary      Patient Name: Hossein Sanchez Sr.  MRN: [id number]  Admission Date: 4/29/2019  Hospital Length of Stay: 1 days  Discharge Date and Time:  04/30/2019 4:06 AM  Attending Physician: Pietro Lindsey, *   Discharging Provider: DEBBIE Willis  Primary Care Provider: Aleksandr Velazquez MD  [facility] Medicine Team: [facility] HOSP MED 3 DEBBIE Willis    HPI:   This is a 79-year-old male with DRISCOLL-induced cirrhosis, hepatocellular carcinoma (treated with Y90 radioembolization) and lung adenocarcinoma ([id number])  s/p radiation, chronic atrial fibrillation, chronic diastolic heart failure, and type 2 diabetes mellitus who was admitted to [facility] on 04/21/2019 secondary to increased abdominal pain and decreased appetite.  The patient has a history of refractory ascites with multiple paracentesis, treatments for SBP, but no positive peritoneal cultures.  The patient initially had a BUN and creatinine of 35 and 2.0 and was treated conservatively despite a possible small amount of free air on CT scan of the abdomen.  The patient has been given IV fluids, IV antibiotics with Flagyl and Zosyn, and bowel rest.  The patient has not improved since admit with severe abdominal pain, and repeat CT scan of the abdomen shows more free air present.  The patient has remained afebrile and hemodynamically stable during the course of this hospitalization.  Renal function however has worsened with creatinine of 4.0 today.  Urine output has decreased to about 300 cc over the past 24 hr. Case was discussed at length with general surgery who will see the patient in consultation.    Upon arrival patient was altered and confused, wife at the bedside, surgery consulted and came evaluated the patient for possible surgery for     bowel perforation, shortly after rapid respoonsse called due to worsening mental status and de-sating to 80s, Critical Care Medicine has  been     consulted for assistance with management. Severity of condition and goals of care initiated with family. Decision made to make pt DNR/DNI and   transition to complete comfort care.             * No surgery found *      Hospital Course:   Patient admitted at a transfer with bowel perforation for general surgery evaluation, shortly after patient mental and respiratory status deteriorated, rapid response called and critical care medicine consulted, goals of care initiated with family.Decision made to make pt DNR/DNI and   transition to complete comfort care and patient passed away comfortably at 2:56 AM.         Consults:   Consults (From admission, onward)        Status Ordering Provider     Inpatient consult to Critical Care Medicine  Once     Provider:  (Not yet assigned)    Completed LIANA LOMBARDO     Inpatient consult to General Surgery  Once     Provider:  (Not yet assigned)    Completed LIANA LOMBARDO            Final Active Diagnoses:    Diagnosis Date Noted POA    PRINCIPAL PROBLEM:  Bowel perforation [K63.1] 2019 Yes    Comfort measures only status [Z51.5] 2019 Not Applicable    Goals of care, counseling/discussion [Z71.89] 2019 Not Applicable    Hepatorenal syndrome [K76.7] 2019 Yes    Thrombocytopenia [D69.6] 2019 Yes    Anemia of chronic disease [D63.8] 2019 Yes    Benign prostatic hyperplasia with urinary hesitancy [N40.1, R39.11] 2019 Yes    YEMI (acute kidney injury) [N17.9] 2019 Yes    Type 2 diabetes mellitus without complication, without long-term current use of insulin [E11.9] 10/25/2018 Yes    HCC (hepatocellular carcinoma) [C22.0] 2018 Yes    Liver cirrhosis secondary to DRISCOLL [K75.81, K74.60] 2018 Yes    Chronic a-fib [I48.2] 2018 Yes      Problems Resolved During this Admission:       Discharged Condition:     Disposition:     Follow Up:    Patient Instructions:   No discharge procedures on  file.    Significant Diagnostic Studies: Labs:   CMP   Recent Labs   Lab 19      K 4.9   CL 99   CO2 9*   *   *   CREATININE 5.4*   CALCIUM 9.2   PROT 6.4   ALBUMIN 2.8*   BILITOT 3.7*   ALKPHOS 111   AST 25   ALT 14   ANIONGAP 28*   ESTGFRAFRICA 10.9*   EGFRNONAA 9.4*    and CBC   Recent Labs   Lab 19   WBC 23.92*   HGB 15.1   HCT 44.6          Pending Diagnostic Studies:     Procedure Component Value Units Date/Time    X-Ray Chest AP Portable [[id number]] Resulted:  19    Order Status:  Sent Lab Status:  In process Updated:  19         Medications:  None (patient  at medical facility)    Indwelling Lines/Drains at time of discharge:   Lines/Drains/Airways     Drain                 Urethral Catheter -- days                Time spent on the discharge of patient: 45 minutes  Patient was seen and examined on the date of discharge and determined to be suitable for discharge.         DEBBIE Willis  Department of Hospital Medicine  [facility]

## 2019-04-30 NOTE — PROGRESS NOTES
Pt. Is very somnelent. Vitals are taken , BP unable to be obtained, O2 sat is 76%, HR is ST/Afib between 120-140. RN sits pt. All the way up and applies 3 L NC raising O2 sat to 85% and pt. Is still incoherent and somnelent. RN calls OC, Rapid nurse, and respiratory. OC and Rapid nurse unable to get BP. Rapid nurse calls official Rapid Response. Non-Rebreather applied to Patient. O2 sats go up to 95%, but pt. Still somnelent. Rapid team takes over, MD at bedside and acute care NP at bedside. Narcan given x2. After discussion with surgery MD and critical care NP patient is made DNR per patient's wishes. LUIGI.

## 2019-04-30 NOTE — HOSPITAL COURSE
Patient admitted at a transfer with bowel perforation for general surgery evaluation, shortly after patient mental and respiratory status deteriorated, rapid response called and critical care medicine consulted, goals of care initiated with family.Decision made to make pt DNR/DNI and   transition to complete comfort care and patient passed away comfortably at 2:56 AM.

## 2019-04-30 NOTE — CONSULTS
"Surgery [facility] and [facility]  Attending: Scott  Resident: Shawn   CC: Abdominal pain     HPI: Hossein Sanchez Sr. is a pleasant 79 y.o. man, DRISCOLL cirrhosis, HCC (treated with Y90 radioembolization) and lung adenocarcinoma ([id number])  s/p radiation,  requiring serial paracentesis, a fib on Eliquis (held for scheduled paracentesis earlier in the week) chronic abdominal pain, who presented to [facility] ED for abdominal pain on 4/21.     He has chronic RLQ abdominal pain, but had a worsening of it on the day after IR paracentesis.  Over the next three days it worsened.  He then presented to the OSH on 4/21.  This was generalized.  CT scan was obtained, and showed a small amount of free air.  No contrast was done due to CKD.  Paracentesis had been done on 4/17/19, and therefore this was not necessarily diagnostic of a viscus injury.    The patient was given IV abx (rocephin), and his pain improved over the next couple of days.  Surgical consult decided not to offer him surgery based on ESLD and stage IV cancer, as well as out of the hope that the pain represented SBP rather than a surgical pathology.      Paracentesis was done on 4/24, and revealed "turbid fluid" 12,000 WBC, 80% neutrophils, 7600 RBC.  Cx are negative x 1 week. Bcx had grown GNR.    Abdominal pain was improving until 4/26.  WBC on 4/27 was 17 (up from 10.2 on 4/23).  This increased to 19.7 on the following day.    On 4/29, due to worsening of abdominal pain and WBC, he was transferred here for further evaluation.  CT scan was repeated, showing worsening free air and fluid.        Past Medical History:   Diagnosis Date    Anemia     Cancer     Diabetes mellitus     Encounter for blood transfusion     GI bleed 8/6/2003    Hypertension     Liver carcinoma     Obstructive sleep apnea on CPAP     Sleep apnea 12/18/15    Stroke 2014    Stroke 7/16/14    TIA (transient ischemic attack) 2/11/15       Past Surgical History:   Procedure Laterality Date    " APPENDECTOMY  teen    LTTCCD-CZIOJD-FG N/A 4/8/2016    [facility]    BIOPSY-LIVER-LAPAROSCOPIC N/A 4/25/2016    Performed by Narinder Chavez MD at [facility] OR [address] [place] [address]    BONE MARROW BIOPSY      CATARACT EXTRACTION  12/10/11    CHOLECYSTECTOMY      CHOLECYSTECTOMY-LAPAROSCOPIC  4/25/2016    Performed by [facility] at [facility] OR [address] [place] [address]    COLONOSCOPY  12/21/15    EMBOLIZATION, ARTERY, LIVER, FOR SELECTIVE INTERNAL RADIATION THERAPY USING YTTRIUM-90 MICROSPHERES N/A 9/25/2018    Per[facility] by [facility]    EMBOLIZATION, BLOOD VESSEL N/A 8/8/2018    Performed by [facility]    Embolization, Yttrium Therapy N/A 7/18/2018    Performed by [facility]    Embolization, Yttrium Therapy N/A 3/29/2018    Performed by [facility]    Embolization, Yttrium Therapy N/A 3/8/2018    Performed by [facility]    JOINT REPLACEMENT Bilateral     knees    KNEE SURGERY Right replacement    KNEE SURGERY Left     TONSILLECTOMY         Family History   Problem Relation Age of Onset    Heart disease Mother     Diabetes Mother     Heart disease Father     Diabetes Father     No Known Problems Sister     Kidney disease Brother     No Known Problems Maternal Aunt     No Known Problems Maternal Uncle     No Known Problems Paternal Aunt     No Known Problems Paternal Uncle     No Known Problems Maternal Grandmother     No Known Problems Maternal Grandfather     Cancer Paternal Grandmother     No Known Problems Paternal Grandfather     Heart disease Brother     Esophageal cancer Brother     Lymphoma Brother     Stomach cancer Brother     Diabetes Daughter        Social History     Socioeconomic History    Marital status:      Spouse name: Not on file    Number of children: Not on file    Years  of education: Not on file    Highest education level: Not on file   Occupational History    Not on file   Social Needs    Financial resource strain: Not on file    Food insecurity:     Worry: Not on file     Inability: Not on file    Transportation needs:     Medical: Not on file     Non-medical: Not on file   Tobacco Use    Smoking status: Never Smoker    Smokeless tobacco: Never Used   Substance and Sexual Activity    Alcohol use: No    Drug use: No    Sexual activity: Not Currently     Partners: Female     Birth control/protection: None   Lifestyle    Physical activity:     Days per week: Not on file     Minutes per session: Not on file    Stress: Not on file   Relationships    Social connections:     Talks on phone: Not on file     Gets together: Not on file     Attends Shinto service: Not on file     Active member of club or organization: Not on file     Attends meetings of clubs or organizations: Not on file     Relationship status: Not on file   Other Topics Concern    Not on file   Social History Narrative    Not on file       No current facility-administered medications on file prior to encounter.      Current Outpatient Medications on File Prior to Encounter   Medication Sig Dispense Refill    apixaban (ELIQUIS) 5 mg Tab Take 5 mg by mouth 2 (two) times daily.      cyanocobalamin (VITAMIN B-12) 1000 MCG tablet Take 100 mcg by mouth once daily.      finasteride (PROSCAR) 5 mg tablet Take 5 mg by mouth once daily.       furosemide (LASIX) 40 MG tablet Take 40 mg by mouth once daily.       iron polysaccharides (NIFEREX) 150 mg iron Cap Take 150 mg by mouth 2 (two) times daily.      metoprolol succinate (TOPROL-XL) 25 MG 24 hr tablet Take 1 tablet (25 mg total) by mouth once daily.      MULTIVIT-IRON-MIN-FOLIC ACID 3,500-18-0.4 UNIT-MG-MG ORAL CHEW Take by mouth.      pantoprazole (PROTONIX) 40 MG tablet Take 40 mg by mouth once daily.      pravastatin (PRAVACHOL) 10 MG tablet Take  10 mg by mouth once daily.      spironolactone (ALDACTONE) 25 MG tablet Take 25 mg by mouth once daily.  6    tamsulosin (FLOMAX) 0.4 mg Cp24 Take 0.8 mg by mouth every evening.       verapamil (CALAN-SR) 180 MG CR tablet Take 1 tablet (180 mg total) by mouth every evening. 30 tablet 2    vitamin D 1000 units Tab Take 2,000 Units by mouth once daily.         Review of patient's allergies indicates:   Allergen Reactions    Altace [ramipril] Anaphylaxis    Adhesive tape-silicones Blisters       ROS:   Constitutional: no fever or chills, pain controlled   Eyes: No eye pain or diplopia   Ears: No change in hearing or tinnitus  Nose: No epistaxis or frequent colds  Respiratory: No cough or dyspnea  Cardiovascular: no chest pain or palpitations   Gastrointestinal: no abdominal pain or vomiting   Genitourinary: no hematuria or dysuria   Hematologic/Lymphatic: no easy bruising or lymphadenopathy   Musculoskeletal: no arthralgias or myalgias   Neurological: no seizures or tremors   Skin: No rashes or itching    Phys:  Vitals:    04/29/19 1908   BP: (!) 90/54   BP Location: Right arm   Pulse: 109   Resp: (!) 22   Temp: 97.3 °F (36.3 °C)   TempSrc: Axillary   SpO2: 99%     Phys:   Gen: NAD   HEENT: NCAT, trachea midline  CV: RRR, no m/r/g   Pulm: Unlabored  Abd: Soft, Moderate ttp RLQ > LUQ.  No rebound, guarding.   Extremities: no cyanosis or edema, or clubbing  Skin: Skin color, texture, turgor normal. No rashes or lesions     Labs (4/29 in AM at OSH): Na 135, Cr 4.7, Plt 89  WBC 15.2    A/P Peritonitis.  Ddx includes paracentesis injury.    The lack of response to IV abx, and reported worsening of pneumoperitoneum are suspicious for an intrabdominal process.  Given co morbidities and 2 separate malignancies which are non operative, we have given the patient and his wife some time to think about whether or not they would like to pursue operative intervention.  We will touch base with the patient and his wife later this  evening, and tomorrow morning    Danny Escobar MD  General Surgery, PGY-5  [phone]   Patient known to me  After consideration of options they patient and family have elected to not pursue surgery as a treatment option based on his poor prognosis and unlikely benefit

## 2019-04-30 NOTE — HPI
Patient is a 79 y.o. male with significant past medical history of DRISCOLL-induced cirrhosis, hepatocellular carcinoma, chronic atrial fibrillation, chronic diastolic heart failure, and type 2 diabetes mellitus presented to hospital as a transfer from [facility] for surgical evaluation of pneumoperitoneum. The patient was admitted to [facility] on 4/21 for abdominal pain and decreased appetite. The patient has a h/o ascites requiring multiple paracenteses and tx for SBP (though reportedly previous cx with no growth). At some point during hospitalization, the patient was found to have a small amount of pneumoperitoneum which was treated conservatively with bowel rest, IVF and abx. Repeat CT showed increased free air. The patient was transferred to [facility] for General Surgery evaluation.     Shortly after arrival to [facility], Rapid Response was called for decreased LOC, inability to obtain O2 sat and BP. Critical Care Medicine has been consulted for assistance with management. Severity of condition and goals of care initiated with family. Decision made to make pt DNR/DNI with family to further discuss amongst themselves.

## 2019-04-30 NOTE — CARE UPDATE
RAPID RESPONSE NURSE NOTE     Admit Date: 2019  LOS: 1  Code Status: DNR   Date of Consult: 2019  : 1940  Age: 79 y.o.  Weight:   Wt Readings from Last 1 Encounters:   19 101.6 kg (223 lb 15.8 oz)     Sex: male  Race: White   Bed: [address] [place] [address] A:   MRN: [id number]  Time Rapid Response Team page Received:    Time Rapid Response Team at Bedside:   Time Rapid Response Team left Bedside:   Was the patient discharged from an ICU this admission?   no  Was the patient discharged from a PACU within last 24 hours?  no  Did the patient receive conscious sedation/general anesthesia within last 24 hours?  no  Was the patient in the ED within the past 24 hours?  no  Was the patient started on NIPPV within the past 24 hours?  no  Did this progress into an ARC or CPA:  no  Attending Physician: Pietro Lindsey, *  Primary Service: [facility] MED 3  Consult Requested By: Pietro Lindsey, *     SITUATION     Reason for Call:   Called to evaluate the patient for Neuro, Respiratory    BACKGROUND     Why is the patient in the hospital?: Bowel perforation     Patient has a past medical history of Anemia, Atrial fibrillation, Cancer, Cirrhosis, CKD (chronic kidney disease), Diabetes mellitus, Encounter for blood transfusion, GI bleed, Hypertension, Liver carcinoma, Obstructive sleep apnea on CPAP, Sleep apnea, Stroke, Stroke, and TIA (transient ischemic attack).    ASSESSMENT/INTERVENTIONS     What did you find:  RRT called to bedside to evaluate patient for AMS, inability to obtain current blood pressure, and low oxygen saturation.  Upon assessment, manual blood pressure and automatic blood pressure unable to be obtained. Pulse oximetry reading 47%, patient placed on non re-breather and Rapid response called.    MD & Critical care service at bedside.  STAT ABG ordered, Chest x ray recently completed upon admit.  Labs and chest X ray reviewed by team.  Suly Lala NP Critical Care service at  bedside.  CCS provider reviewing severity of illness, and risks of interventions the decision was made with patients wife and children to transition the patient to comfort measures.      RECOMMENDATIONS     We recommend: Consult  for spiritual distress, increase titratable drip to achieve greatest comfort for patient.     FOLLOW-UP/CONTINGENCY PLAN       Call the Rapid Response Nurse, Yenny Craft RN at x [phone] for additional questions or concerns.    PHYSICIAN ESCALATION     Orders received and case discussed with Suly Lala NP.    Disposition: Remain in room 1061 A.

## 2019-04-30 NOTE — PROGRESS NOTES
Discussed with wife twice more.  She states that after discussion with the rest of her family, they would like to decline surgery, and move towards comfort care.     I told her that I agree with her decision and offered condolences.

## 2019-04-30 NOTE — ASSESSMENT & PLAN NOTE
--Developed pneumoperitoneum at OSH and failed conservative management. Large amount of free air under diaphragm noted on cxry this evening. Pt evaluated by General Surgery.   --Given underlying malignancies, severity of condition, developing multi-organ dysfunction and poor prognosis, family has elected to forgo operative intervention and pursue full comfort measures.  --Labs/meds not geared toward comfort/etc d/c'd and palliative care orders placed. Family aware pt may not survive the night.   -- notified, attempted to find  to perform last rites  --Dispo discussed with both Hospital Medicine & General Surgery

## 2019-04-30 NOTE — PROGRESS NOTES
Pt. Heart monitor shows asystole. Pt. Has no respirations, no heart beat, and pupils fixed. IM 3 paged to come declare TOD. House supervisor informed. Spiritual services informed. Family offered condolences and given privacy. WCTM family for further needs and/or assistance.

## 2019-04-30 NOTE — H&P
[facility] Hospital Medicine  History & Physical    Patient Name: Hossein Sanchez Sr.  MRN: [id number]  Admission Date: 4/29/2019  Attending Physician: Pietro Lindsey, *   Primary Care Provider: Aleksandr Velazquez MD    [facility] Medicine Team: [facility] MED 3 DEBBIE Willis     Patient information was obtained from patient and past medical records.     Subjective:     Principal Problem:Bowel perforation    Chief Complaint: Abdominal pain    HPI: This is a 79-year-old male with DRISCOLL-induced cirrhosis, hepatocellular carcinoma (treated with Y90 radioembolization) and lung adenocarcinoma ([id number])  s/p radiation, chronic atrial fibrillation, chronic diastolic heart failure, and type 2 diabetes mellitus who was admitted to [facility] on 04/21/2019 secondary to increased abdominal pain and decreased appetite.  The patient has a history of refractory ascites with multiple paracentesis, treatments for SBP, but no positive peritoneal cultures.  The patient initially had a BUN and creatinine of 35 and 2.0 and was treated conservatively despite a possible small amount of free air on CT scan of the abdomen.  The patient has been given IV fluids, IV antibiotics with Flagyl and Zosyn, and bowel rest.  The patient has not improved since admit with severe abdominal pain, and repeat CT scan of the abdomen shows more free air present.  The patient has remained afebrile and hemodynamically stable during the course of this hospitalization.  Renal function however has worsened with creatinine of 4.0 today.  Urine output has decreased to about 300 cc over the past 24 hr. Case was discussed at length with general surgery who will see the patient in consultation.    Upon arrival patient was altered and confused, wife at the bedside, surgery consulted and came evaluated the patient for possible surgery for     bowel perforation, shortly after rapid respoonsse called due to worsening mental status and  de-sating to 80s, Critical Care Medicine has been     consulted for assistance with management. Severity of condition and goals of care initiated with family. Decision made to make pt DNR/DNI and   transition to complete comfort care.             Past Medical History:   Diagnosis Date    Anemia     Atrial fibrillation     Cancer     Cirrhosis     CKD (chronic kidney disease)     Diabetes mellitus     Encounter for blood transfusion     GI bleed 8/6/2003    Hypertension     Liver carcinoma     Obstructive sleep apnea on CPAP     Sleep apnea 12/18/15    Stroke 2014    Stroke 7/16/14    TIA (transient ischemic attack) 2/11/15       Past Surgical History:   Procedure Laterality Date    APPENDECTOMY  teen    ZCSSUK-AOTNLE-TY N/A 4/8/2016    [facility]    BIOPSY-LIVER-LAPAROSCOPIC N/A 4/25/2016    Performed by Narinder Chavez MD at [facility] OR [address] [place] [address]    BONE MARROW BIOPSY      CATARACT EXTRACTION  12/10/11    CHOLECYSTECTOMY      CHOLECYSTECTOMY-LAPAROSCOPIC  4/25/2016    Performed by Narinder [facility] MD at [facility] OR [address] [place] [address]    COLONOSCOPY  12/21/15    EMBOLIZATION, ARTERY, LIVER, FOR SELECTIVE INTERNAL RADIATION THERAPY USING YTTRIUM-90 MICROSPHERES N/A 9/25/2018    Performed by [facility]    EMBOLIZATION, BLOOD VESSEL N/A 8/8/2018    Performed by [facility]    Embolization, Yttrium Therapy N/A 7/18/2018    Performed by [facility]    Embolization, Yttrium Therapy N/A 3/29/2018    Performed by [facility]    Embolization, Yttrium Therapy N/A 3/8/2018    Performed by [facility]    JOINT REPLACEMENT Bilateral     knees    KNEE SURGERY Right replacement    KNEE SURGERY Left     TONSILLECTOMY         Review of patient's allergies indicates:   Allergen Reactions    Altace [ramipril]  Anaphylaxis    Adhesive tape-silicones Blisters       Family History     Problem Relation (Age of Onset)    Cancer Paternal Grandmother    Diabetes Mother, Father, Daughter    Esophageal cancer Brother    Heart disease Mother, Father, Brother    Kidney disease Brother    Lymphoma Brother    No Known Problems Sister, Maternal Aunt, Maternal Uncle, Paternal Aunt, Paternal Uncle, Maternal Grandmother, Maternal Grandfather, Paternal Grandfather    Stomach cancer Brother        Tobacco Use    Smoking status: Never Smoker    Smokeless tobacco: Never Used   Substance and Sexual Activity    Alcohol use: No    Drug use: No    Sexual activity: Not Currently     Partners: Female     Birth control/protection: None      Review of Systems   Unable to perform ROS: Mental status change     Objective:     Vital Signs (Most Recent):  Temp: 97.3 °F (36.3 °C) (04/29/19 1908)  Pulse: 109 (04/29/19 1908)  Resp: (!) 22 (04/29/19 1908)  BP: (!) 90/54 (04/29/19 1908)  SpO2: 99 % (04/29/19 1908) Vital Signs (24h Range):  Temp:  [97.3 °F (36.3 °C)-98.7 °F (37.1 °C)] 97.3 °F (36.3 °C)  Pulse:  [105-109] 109  Resp:  [18-22] 22  SpO2:  [92 %-99 %] 99 %  BP: ()/(54-69) 90/54      There is no height or weight on file to calculate BMI.    No intake or output data in the 24 hours ending 04/30/19 0156    Physical Exam   Constitutional: He appears well-developed and well-nourished. He appears distressed.   HENT:   Head: Normocephalic and atraumatic.   Right Ear: External ear normal.   Eyes: Pupils are equal, round, and reactive to light. Conjunctivae are normal.   Neck: Normal range of motion. Neck supple.   Cardiovascular: Normal heart sounds. An irregularly irregular rhythm present. Tachycardia present.   Pulmonary/Chest: Breath sounds normal. Tachypnea noted. He is in respiratory distress.   Abdominal: Soft. He exhibits distension. Bowel sounds are absent.   Musculoskeletal: He exhibits no deformity.   Neurological: He is alert. GCS eye  subscore is 2. GCS verbal subscore is 1. GCS motor subscore is 4.   disoriented to time and place, only oriented to self    Skin: Skin is warm and dry. He is not diaphoretic.   Cold extremities    Psychiatric:   somnolent    Nursing note and vitals reviewed.      Vents:     Lines/Drains/Airways     Drain                 Urethral Catheter -- days          Peripheral Intravenous Line                 Peripheral IV - Single Lumen Left;Posterior Wrist -- days         Peripheral IV - Single Lumen 02/13/19 0847 Left Wrist 75 days              Significant Labs:    CBC/Anemia Profile:  Recent Labs   Lab 04/29/19 2058   WBC 23.92*   HGB 15.1   HCT 44.6      MCV 93   RDW 18.0*        Chemistries:  Recent Labs   Lab 04/29/19 2053      K 4.9   CL 99   CO2 9*   *   CREATININE 5.4*   CALCIUM 9.2   ALBUMIN 2.8*   PROT 6.4   BILITOT 3.7*   ALKPHOS 111   ALT 14   AST 25       All pertinent labs within the past 24 hours have been reviewed.    Significant Imaging: I have reviewed and interpreted all pertinent imaging results/findings within the past 24 hours.        CRANIAL NERVES     CN III, IV, VI   Pupils are equal, round, and reactive to light.        Assessment/Plan:     * Bowel perforation  Developed pneumoperitoneum at OSH and failed conservative management. Large amount of free air under diaphragm noted on cxry this evening. Pt evaluated by General Surgery.   Given the multiple co-morbilities, patient deterioration and the risk of surgery, patient family decided to not proceed with the surgery and initiate comfort care      Comfort measures only status    Pt's wife has stated that pt previously expressed not to be put on life support. Options discussed with family, pursuing surgical intervention vs medical management with likely progression to comfort measures. Family agreed for comfort care only     Hepatorenal syndrome  Patient presented with decompensated liver cirrhosis and YEMI  initially we initiated  hepatorenal syndrom treatment with midodrine, albumin and octreotidebut then we d/c all the treatment and transitioned patient to comfort care per family request       Anemia of chronic disease  Comfort care measures only       Benign prostatic hyperplasia with urinary hesitancy  Comfort care measures only     YEMI (acute kidney injury)  - possible pre-renal from liver dysfunction, HRS?  Comfort care measures only       Type 2 diabetes mellitus without complication, without long-term current use of insulin  Comfort care      Chronic a-fib  Presented with Afib with RVR  - Comfort care     Liver cirrhosis secondary to DRISCOLL  HCC (hepatocellular carcinoma)  DRISCOLL cirrhosis c/b HCC (treated with Y-90)  - comfort care       VTE Risk Mitigation (From admission, onward)        Ordered     IP VTE HIGH RISK PATIENT  Once      04/29/19 2028             DEBBIE Willis  Department of Hospital Medicine   [facility]

## 2019-04-30 NOTE — CONSULTS
Patient seen and evaluated.     Pt with underlying hepatocellular carcinoma and lung adenocarinoma transferred from OSH for General Surgery consultation for pneumoperitoneum which failed conservative management. Pt's family is not sure they want to pursue surgical intervention at this time and are discussing options.     Rapid Response was called for decreased LOC, inability to obtain O2 sat and BP. Primary team Dr. Collado at bedside. Case discussed with pt's wife (at bedside), two sons, daughter (via telephone), Dr. Collado and Dr. Escobar with General Surgery. Pt's wife has stated that pt previously expressed not to be put on life support. Options discussed with family, pursuing surgical intervention vs medical management with likely progression to comfort measures. Family to discuss further, will touch base with them when pt's daughter arrives later this evening.     Pt to remain on floor for now. Full consult note to follow.    Suly Lala NP  Critical Care Medicine

## 2019-04-30 NOTE — ASSESSMENT & PLAN NOTE
Pt's wife has stated that pt previously expressed not to be put on life support. Options discussed with family, pursuing surgical intervention vs medical management with likely progression to comfort measures. Family agreed for comfort care only

## 2019-04-30 NOTE — ASSESSMENT & PLAN NOTE
Patient presented with decompensated liver cirrhosis and YEMI  initially we initiated hepatorenal syndrom treatment with midodrine, albumin and octreotidebut then we d/c all the treatment and transitioned patient to comfort care per family request

## 2019-04-30 NOTE — PROGRESS NOTES
Pt arrived to floor via ambulance on stretcher.  Spouse at bedside.  Pt AA, RR even and unlabored.  Storey cath intact with dark uop.  BP 90/54, -113. O2 99% on RA.  Report given to Yoan ZIMMER (night RN) and both RN at bedside to assess pt upon arrival.  Bed locked, lowest position, call light in reach, pt and spouse oriented to room/call light.

## 2019-04-30 NOTE — ASSESSMENT & PLAN NOTE
Developed pneumoperitoneum at OSH and failed conservative management. Large amount of free air under diaphragm noted on cxry this evening. Pt evaluated by General Surgery.   Given the multiple co-morbilities, patient deterioration and the risk of surgery, patient family decided to not proceed with the surgery and initiate comfort care     Yes

## 2019-04-30 NOTE — SIGNIFICANT EVENT
Critical Care Medicine                                                              Death Note      Called to bedside by patient's nurse. Nursing supervisor notified. Family at bedside.  has been called and was previously at bedside.  Patient is not responding to verbal or tactile stimuli. Patient does not have a papillary or corneal reflex. His pupils are fixed and dilated. No heart or breath sounds on auscultation. No respirations. No palpable pulses.     Time of death 2:56 AM.      Cause of Death: Multi-organ failure from bowel perforation    James Willson MD  Internal Medicine  [facility]  Pager: [phone]

## 2019-04-30 NOTE — SUBJECTIVE & OBJECTIVE
Past Medical History:   Diagnosis Date    Anemia     Atrial fibrillation     Cancer     Cirrhosis     CKD (chronic kidney disease)     Diabetes mellitus     Encounter for blood transfusion     GI bleed 8/6/2003    Hypertension     Liver carcinoma     Obstructive sleep apnea on CPAP     Sleep apnea 12/18/15    Stroke 2014    Stroke 7/16/14    TIA (transient ischemic attack) 2/11/15       Past Surgical History:   Procedure Laterality Date    APPENDECTOMY  teen    YRYACN-HHYSLL-BQ N/A 4/8/2016    [facility]formed [facility] OR [address] [place] [address]    BIOPSY-LIVER-LAPAROSCOPIC N/A 4/25/2016    Performed by Narinder Chavez MD at [facility] OR [address] [place] [address]    BONE MARROW BIOPSY      CATARACT EXTRACTION  12/10/11    CHOLECYSTECTOMY      CHOLECYSTECTOMY-LAPAROSCOPIC  4/25/2016    Performed by Narinder [facility]avez [facility] at [facility] OR [address] [place] [address]    COLONOSCOPY  12/21/15    EMBOLIZATION, ARTERY, LIVER, FOR SELECTIVE INTERNAL RADIATION THERAPY USING YTTRIUM-90 MICROSPHERES N/A 9/25/2018    [facility]    EMBOLIZATION, BLOOD VESSEL N/A 8/8/2018    [facility]    Embolization, Yttrium Therapy N/A 7/18/2018    [facility]    Embolization, Yttrium Therapy N/A 3/29/2018    [facility]    Embolization, Yttrium Therapy N/A 3/8/2018    [facility]    JOINT REPLACEMENT Bilateral     knees    KNEE SURGERY Right replacement    KNEE SURGERY Left     TONSILLECTOMY         Review of patient's allergies indicates:   Allergen Reactions    Altace [ramipril] Anaphylaxis    Adhesive tape-silicones Blisters       Family History     Problem Relation (Age of Onset)    Cancer Paternal Grandmother    Diabetes Mother, Father, Daughter    Esophageal cancer Brother    Heart disease Mother, Father, Brother     Kidney disease Brother    Lymphoma Brother    No Known Problems Sister, Maternal Aunt, Maternal Uncle, Paternal Aunt, Paternal Uncle, Maternal Grandmother, Maternal Grandfather, Paternal Grandfather    Stomach cancer Brother        Tobacco Use    Smoking status: Never Smoker    Smokeless tobacco: Never Used   Substance and Sexual Activity    Alcohol use: No    Drug use: No    Sexual activity: Not Currently     Partners: Female     Birth control/protection: None      Review of Systems   Unable to perform ROS: Mental status change     Objective:     Vital Signs (Most Recent):  Temp: 97.3 °F (36.3 °C) (04/29/19 1908)  Pulse: 109 (04/29/19 1908)  Resp: (!) 22 (04/29/19 1908)  BP: (!) 90/54 (04/29/19 1908)  SpO2: 99 % (04/29/19 1908) Vital Signs (24h Range):  Temp:  [97.3 °F (36.3 °C)-98.7 °F (37.1 °C)] 97.3 °F (36.3 °C)  Pulse:  [105-109] 109  Resp:  [18-22] 22  SpO2:  [92 %-99 %] 99 %  BP: ()/(54-69) 90/54      There is no height or weight on file to calculate BMI.    No intake or output data in the 24 hours ending 04/30/19 0156    Physical Exam   Constitutional: He appears well-developed and well-nourished. He appears distressed.   HENT:   Head: Normocephalic and atraumatic.   Right Ear: External ear normal.   Eyes: Pupils are equal, round, and reactive to light. Conjunctivae are normal.   Neck: Normal range of motion. Neck supple.   Cardiovascular: Normal heart sounds. An irregularly irregular rhythm present. Tachycardia present.   Pulmonary/Chest: Breath sounds normal. Tachypnea noted. He is in respiratory distress.   Abdominal: Soft. He exhibits distension. Bowel sounds are absent.   Musculoskeletal: He exhibits no deformity.   Neurological: He is alert. GCS eye subscore is 2. GCS verbal subscore is 1. GCS motor subscore is 4.   disoriented to time and place, only oriented to self    Skin: Skin is warm and dry. He is not diaphoretic.   Cold extremities    Psychiatric:   somnolent    Nursing note and  vitals reviewed.      Vents:     Lines/Drains/Airways     Drain                 Urethral Catheter -- days          Peripheral Intravenous Line                 Peripheral IV - Single Lumen Left;Posterior Wrist -- days         Peripheral IV - Single Lumen 02/13/19 0847 Left Wrist 75 days              Significant Labs:    CBC/Anemia Profile:  Recent Labs   Lab 04/29/19 2058   WBC 23.92*   HGB 15.1   HCT 44.6      MCV 93   RDW 18.0*        Chemistries:  Recent Labs   Lab 04/29/19 2053      K 4.9   CL 99   CO2 9*   *   CREATININE 5.4*   CALCIUM 9.2   ALBUMIN 2.8*   PROT 6.4   BILITOT 3.7*   ALKPHOS 111   ALT 14   AST 25       All pertinent labs within the past 24 hours have been reviewed.    Significant Imaging: I have reviewed and interpreted all pertinent imaging results/findings within the past 24 hours.        CRANIAL NERVES     CN III, IV, VI   Pupils are equal, round, and reactive to light.

## 2019-04-30 NOTE — SUBJECTIVE & OBJECTIVE
Past Medical History:   Diagnosis Date    Anemia     Atrial fibrillation     Cancer     Cirrhosis     CKD (chronic kidney disease)     Diabetes mellitus     Encounter for blood transfusion     GI bleed 8/6/2003    Hypertension     Liver carcinoma     Obstructive sleep apnea on CPAP     Sleep apnea 12/18/15    Stroke 2014    Stroke 7/16/14    TIA (transient ischemic attack) 2/11/15       Past Surgical History:   Procedure Laterality Date    APPENDECTOMY  teen    [id number]-[id number]-QW N/A 4/8/2016    Performed by [facility] OR [address] [place] [address]    BIOPSY-LIVER-LAPAROSCOPIC N/A 4/25/2016    Performed by Narinder Chavez MD at [facility] OR [address] [place] [address]    BONE MARROW BIOPSY      CATARACT EXTRACTION  12/10/11    CHOLECYSTECTOMY      CHOLECYSTECTOMY-LAPAROSCOPIC  4/25/2016    Performed by Narinder Chavez MD at [facility] OR [address] [place] [address]    COLONOSCOPY  12/21/15    EMBOLIZATION, ARTERY, LIVER, FOR SELECTIVE INTERNAL RADIATION THERAPY USING YTTRIUM-90 MICROSPHERES N/A 9/25/2018    [facility]    EMBOLIZATION, BLOOD VESSEL N/A 8/8/2018    [facility]    Embolization, Yttrium Therapy N/A 7/18/2018    [facility]    Embolization, Yttrium Therapy N/A 3/29/2018    [facility]    Embolization, Yttrium Therapy N/A 3/8/2018    Per[facility]    JOINT REPLACEMENT Bilateral     knees    KNEE SURGERY Right replacement    KNEE SURGERY Left     TONSILLECTOMY         Review of patient's allergies indicates:   Allergen Reactions    Altace [ramipril] Anaphylaxis    Adhesive tape-silicones Blisters       Family History     Problem Relation (Age of Onset)    Cancer Paternal Grandmother    Diabetes Mother, Father, Daughter    Esophageal cancer Brother    Heart disease Mother, Father, Brother     Kidney disease Brother    Lymphoma Brother    No Known Problems Sister, Maternal Aunt, Maternal Uncle, Paternal Aunt, Paternal Uncle, Maternal Grandmother, Maternal Grandfather, Paternal Grandfather    Stomach cancer Brother        Tobacco Use    Smoking status: Never Smoker    Smokeless tobacco: Never Used   Substance and Sexual Activity    Alcohol use: No    Drug use: No    Sexual activity: Not Currently     Partners: Female     Birth control/protection: None      Review of Systems   Unable to perform ROS: Mental status change     Objective:     Vital Signs (Most Recent):  Temp: 97.3 °F (36.3 °C) (04/29/19 1908)  Pulse: 109 (04/29/19 1908)  Resp: (!) 22 (04/29/19 1908)  BP: (!) 90/54 (04/29/19 1908)  SpO2: 99 % (04/29/19 1908) Vital Signs (24h Range):  Temp:  [97.3 °F (36.3 °C)-98.7 °F (37.1 °C)] 97.3 °F (36.3 °C)  Pulse:  [105-109] 109  Resp:  [18-22] 22  SpO2:  [92 %-99 %] 99 %  BP: ()/(54-69) 90/54      There is no height or weight on file to calculate BMI.    No intake or output data in the 24 hours ending 04/30/19 0033    Physical Exam   Constitutional: He appears well-developed and well-nourished. He appears lethargic. He appears distressed.   HENT:   Head: Normocephalic and atraumatic.   Right Ear: External ear normal.   Left Ear: External ear normal.   Nose: Nose normal.   Mouth/Throat: Oropharynx is clear and moist.   Eyes: Pupils are equal, round, and reactive to light. Conjunctivae and EOM are normal.   Neck: Normal range of motion. Neck supple.   Cardiovascular: Normal rate, regular rhythm and normal heart sounds.   Pulmonary/Chest: Breath sounds normal. Tachypnea noted.   Abdominal: Soft. Bowel sounds are absent.   Musculoskeletal: He exhibits no deformity.   Neurological: He appears lethargic. GCS eye subscore is 2. GCS verbal subscore is 1. GCS motor subscore is 4.   Skin: Skin is warm and dry. He is not diaphoretic.   Nursing note and vitals reviewed.      Vents:     Lines/Drains/Airways      Drain                 Urethral Catheter -- days          Peripheral Intravenous Line                 Peripheral IV - Single Lumen Left;Posterior Wrist -- days         Peripheral IV - Single Lumen 02/13/19 0847 Left Wrist 75 days              Significant Labs:    CBC/Anemia Profile:  Recent Labs   Lab 04/29/19 2058   WBC 23.92*   HGB 15.1   HCT 44.6      MCV 93   RDW 18.0*        Chemistries:  Recent Labs   Lab 04/29/19 2053      K 4.9   CL 99   CO2 9*   *   CREATININE 5.4*   CALCIUM 9.2   ALBUMIN 2.8*   PROT 6.4   BILITOT 3.7*   ALKPHOS 111   ALT 14   AST 25       All pertinent labs within the past 24 hours have been reviewed.    Significant Imaging: I have reviewed and interpreted all pertinent imaging results/findings within the past 24 hours.

## 2019-04-30 NOTE — CONSULTS
[facility]  Critical Care Medicine  Consult Note    Patient Name: Hossein Sanchez Sr.  MRN: [id number]  Admission Date: 4/29/2019  Hospital Length of Stay: 1 days  Code Status: DNR  Attending Physician: Pietro Lindsey, *   Primary Care Provider: Aleksandr Velazquez MD   Principal Problem: <principal problem not specified>    Consults  Subjective:     HPI:  Patient is a 79 y.o. male with significant past medical history of DRISCOLL-induced cirrhosis, hepatocellular carcinoma, chronic atrial fibrillation, chronic diastolic heart failure, and type 2 diabetes mellitus presented to hospital as a transfer from [facility] for surgical evaluation of pneumoperitoneum. The patient was admitted to [facility] on 4/21 for abdominal pain and decreased appetite. The patient has a h/o ascites requiring multiple paracenteses and tx for SBP (though reportedly previous cx with no growth). At some point during hospitalization, the patient was found to have a small amount of pneumoperitoneum which was treated conservatively with bowel rest, IVF and abx. Repeat CT showed increased free air. The patient was transferred to [facility] for General Surgery evaluation.     Shortly after arrival to [facility], Rapid Response was called for decreased LOC, inability to obtain O2 sat and BP. Critical Care Medicine has been consulted for assistance with management. Severity of condition and goals of care initiated with family. Decision made to make pt DNR/DNI with family to further discuss amongst themselves.           Hospital/ICU Course:  No notes on file    Past Medical History:   Diagnosis Date    Anemia     Atrial fibrillation     Cancer     Cirrhosis     CKD (chronic kidney disease)     Diabetes mellitus     Encounter for blood transfusion     GI bleed 8/6/2003    Hypertension     Liver carcinoma     Obstructive sleep apnea on CPAP     Sleep apnea 12/18/15    Stroke 2014    Stroke 7/16/14    TIA (transient  ischemic attack) 2/11/15       Past Surgical History:   Procedure Laterality Date    APPENDECTOMY  teen    [id number]-[id number]-HH N/A 4/8/2016    Performed by [facility] Provider at [facility] OR [address] [place] [address]    BIOPSY-LIVER-LAPAROSCOPIC N/A 4/25/2016    Performed by Narinder Chavez MD at [facility] OR [address] [place] [address]    BONE MARROW BIOPSY      CATARACT EXTRACTION  12/10/11    CHOLECYSTECTOMY      CHOLECYSTECTOMY-LAPAROSCOPIC  4/25/2016    Performed by Narinder [facility]avez MD at [facility] OR [address] [place] [address]    COLONOSCOPY  12/21/15    EMBOLIZATION, ARTERY, LIVER, FOR SELECTIVE INTERNAL RADIATION THERAPY USING YTTRIUM-90 MICROSPHERES N/A 9/25/2018    [facility]    EMBOLIZATION, BLOOD VESSEL N/A 8/8/2018    Performed by [facility] Diagnostic Provider at [facility] OR [address] [place] [address]    Embolization, Yttrium Therapy N/A 7/18/2018    [facility]    Embolization, Yttrium Therapy N/A 3/29/2018    [facility]    Embolization, Yttrium Therapy N/A 3/8/2018    [facility]    JOINT REPLACEMENT Bilateral     knees    KNEE SURGERY Right replacement    KNEE SURGERY Left     TONSILLECTOMY         Review of patient's allergies indicates:   Allergen Reactions    Altace [ramipril] Anaphylaxis    Adhesive tape-silicones Blisters       Family History     Problem Relation (Age of Onset)    Cancer Paternal Grandmother    Diabetes Mother, Father, Daughter    Esophageal cancer Brother    Heart disease Mother, Father, Brother    Kidney disease Brother    Lymphoma Brother    No Known Problems Sister, Maternal Aunt, Maternal Uncle, Paternal Aunt, Paternal Uncle, Maternal Grandmother, Maternal Grandfather, Paternal Grandfather    Stomach cancer Brother        Tobacco Use    Smoking status: Never Smoker    Smokeless tobacco: Never Used   Substance and Sexual Activity    Alcohol use: No    Drug use: No     Sexual activity: Not Currently     Partners: Female     Birth control/protection: None      Review of Systems   Unable to perform ROS: Mental status change     Objective:     Vital Signs (Most Recent):  Temp: 97.3 °F (36.3 °C) (04/29/19 1908)  Pulse: 109 (04/29/19 1908)  Resp: (!) 22 (04/29/19 1908)  BP: (!) 90/54 (04/29/19 1908)  SpO2: 99 % (04/29/19 1908) Vital Signs (24h Range):  Temp:  [97.3 °F (36.3 °C)-98.7 °F (37.1 °C)] 97.3 °F (36.3 °C)  Pulse:  [105-109] 109  Resp:  [18-22] 22  SpO2:  [92 %-99 %] 99 %  BP: ()/(54-69) 90/54      There is no height or weight on file to calculate BMI.    No intake or output data in the 24 hours ending 04/30/19 0033    Physical Exam   Constitutional: He appears well-developed and well-nourished. He appears lethargic. He appears distressed.   HENT:   Head: Normocephalic and atraumatic.   Right Ear: External ear normal.   Left Ear: External ear normal.   Nose: Nose normal.   Mouth/Throat: Oropharynx is clear and moist.   Eyes: Pupils are equal, round, and reactive to light. Conjunctivae and EOM are normal.   Neck: Normal range of motion. Neck supple.   Cardiovascular: Normal rate, regular rhythm and normal heart sounds.   Pulmonary/Chest: Breath sounds normal. Tachypnea noted.   Abdominal: Soft. Bowel sounds are absent.   Musculoskeletal: He exhibits no deformity.   Neurological: He appears lethargic. GCS eye subscore is 2. GCS verbal subscore is 1. GCS motor subscore is 4.   Skin: Skin is warm and dry. He is not diaphoretic.   Nursing note and vitals reviewed.      Vents:     Lines/Drains/Airways     Drain                 Urethral Catheter -- days          Peripheral Intravenous Line                 Peripheral IV - Single Lumen Left;Posterior Wrist -- days         Peripheral IV - Single Lumen 02/13/19 0847 Left Wrist 75 days              Significant Labs:    CBC/Anemia Profile:  Recent Labs   Lab 04/29/19 2058   WBC 23.92*   HGB 15.1   HCT 44.6      MCV 93    RDW 18.0*        Chemistries:  Recent Labs   Lab 04/29/19  2053      K 4.9   CL 99   CO2 9*   *   CREATININE 5.4*   CALCIUM 9.2   ALBUMIN 2.8*   PROT 6.4   BILITOT 3.7*   ALKPHOS 111   ALT 14   AST 25       All pertinent labs within the past 24 hours have been reviewed.    Significant Imaging: I have reviewed and interpreted all pertinent imaging results/findings within the past 24 hours.      ABG  No results for input(s): PH, PO2, PCO2, HCO3, BE in the last 168 hours.  Assessment/Plan:     GI  Bowel perforation  --Developed pneumoperitoneum at OSH and failed conservative management. Large amount of free air under diaphragm noted on cxry this evening. Pt evaluated by General Surgery.   --Given underlying malignancies, severity of condition, developing multi-organ dysfunction and poor prognosis, family has elected to forgo operative intervention and pursue full comfort measures.  --Labs/meds not geared toward comfort/etc d/c'd and palliative care orders placed. Family aware pt may not survive the night.   -- notified, attempted to find  to perform last rites  --Dispo discussed with both Hospital Medicine & General Surgery       Critical Care Time: 60 minutes  Critical secondary to Patient has a condition that poses threat to life and bodily function: MODS, pneumoperitoneum     Critical care was time spent personally by me on the following activities: development of treatment plan with patient or surrogate and bedside caregivers, discussions with consultants, evaluation of patient's response to treatment, examination of patient, ordering and performing treatments and interventions, ordering and review of laboratory studies, ordering and review of radiographic studies, pulse oximetry, re-evaluation of patient's condition. This critical care time did not overlap with that of any other provider or involve time for any procedures.    Thank you for your consult. I will sign off. Please  contact us if you have any additional questions.     Case discussed with Dr. Maco Solorio.      Suly Lala NP  Critical Care Medicine  [facility]

## 2019-06-01 NOTE — PATIENT INSTRUCTIONS
- blood work today  - follow up with Interventional Radiology and MRI on 6/29/18   - return in 6 months  
No